# Patient Record
Sex: FEMALE | Race: BLACK OR AFRICAN AMERICAN | NOT HISPANIC OR LATINO | Employment: UNEMPLOYED | URBAN - METROPOLITAN AREA
[De-identification: names, ages, dates, MRNs, and addresses within clinical notes are randomized per-mention and may not be internally consistent; named-entity substitution may affect disease eponyms.]

---

## 2024-01-07 ENCOUNTER — TRANSFERRED RECORDS (OUTPATIENT)
Dept: HEALTH INFORMATION MANAGEMENT | Facility: CLINIC | Age: 44
End: 2024-01-07

## 2024-07-10 ENCOUNTER — TRANSFERRED RECORDS (OUTPATIENT)
Dept: HEALTH INFORMATION MANAGEMENT | Facility: CLINIC | Age: 44
End: 2024-07-10

## 2024-07-10 ENCOUNTER — HOSPITAL ENCOUNTER (OUTPATIENT)
Facility: CLINIC | Age: 44
End: 2024-07-10
Admitting: OBSTETRICS & GYNECOLOGY
Payer: MEDICAID

## 2024-07-10 ENCOUNTER — APPOINTMENT (OUTPATIENT)
Dept: ULTRASOUND IMAGING | Facility: CLINIC | Age: 44
End: 2024-07-10
Attending: STUDENT IN AN ORGANIZED HEALTH CARE EDUCATION/TRAINING PROGRAM
Payer: MEDICAID

## 2024-07-10 ENCOUNTER — HOSPITAL ENCOUNTER (OUTPATIENT)
Facility: CLINIC | Age: 44
Discharge: HOME | End: 2024-07-10
Attending: STUDENT IN AN ORGANIZED HEALTH CARE EDUCATION/TRAINING PROGRAM | Admitting: OBSTETRICS & GYNECOLOGY
Payer: MEDICAID

## 2024-07-10 VITALS
WEIGHT: 213.85 LBS | RESPIRATION RATE: 16 BRPM | HEART RATE: 102 BPM | OXYGEN SATURATION: 98 % | TEMPERATURE: 98.2 F | BODY MASS INDEX: 39.35 KG/M2 | HEIGHT: 62 IN | DIASTOLIC BLOOD PRESSURE: 74 MMHG | SYSTOLIC BLOOD PRESSURE: 132 MMHG

## 2024-07-10 PROBLEM — R10.13 EPIGASTRIC ABDOMINAL PAIN: Status: ACTIVE | Noted: 2024-07-10

## 2024-07-10 PROBLEM — R42 DIZZINESS: Status: ACTIVE | Noted: 2024-07-10

## 2024-07-10 PROBLEM — R10.9 FLANK PAIN: Status: ACTIVE | Noted: 2024-07-10

## 2024-07-10 LAB
ABO/RH(D): NORMAL
ALBUMIN MFR UR ELPH: 80.1 MG/DL
ALBUMIN SERPL BCG-MCNC: 3.5 G/DL (ref 3.5–5.2)
ALBUMIN UR-MCNC: 20 MG/DL
ALP SERPL-CCNC: 104 U/L (ref 40–150)
ALT SERPL W P-5'-P-CCNC: 74 U/L (ref 0–50)
ANION GAP SERPL CALCULATED.3IONS-SCNC: 10 MMOL/L (ref 7–15)
ANTIBODY SCREEN: NEGATIVE
APPEARANCE UR: CLEAR
AST SERPL W P-5'-P-CCNC: 65 U/L (ref 0–45)
BACTERIA #/AREA URNS HPF: ABNORMAL /HPF
BASOPHILS # BLD AUTO: 0 10E3/UL (ref 0–0.2)
BASOPHILS NFR BLD AUTO: 0 %
BILIRUB SERPL-MCNC: 2.2 MG/DL
BILIRUB UR QL STRIP: NEGATIVE
BUN SERPL-MCNC: 4.8 MG/DL (ref 6–20)
CALCIUM SERPL-MCNC: 8.8 MG/DL (ref 8.6–10)
CHLORIDE SERPL-SCNC: 106 MMOL/L (ref 98–107)
COLOR UR AUTO: ABNORMAL
CREAT SERPL-MCNC: 0.56 MG/DL (ref 0.51–0.95)
CREAT UR-MCNC: 344 MG/DL
DEPRECATED HCO3 PLAS-SCNC: 23 MMOL/L (ref 22–29)
EGFRCR SERPLBLD CKD-EPI 2021: >90 ML/MIN/1.73M2
EOSINOPHIL # BLD AUTO: 0.1 10E3/UL (ref 0–0.7)
EOSINOPHIL NFR BLD AUTO: 1 %
ERYTHROCYTE [DISTWIDTH] IN BLOOD BY AUTOMATED COUNT: 16.1 % (ref 10–15)
GLUCOSE SERPL-MCNC: 89 MG/DL (ref 70–99)
GLUCOSE UR STRIP-MCNC: NEGATIVE MG/DL
HBV SURFACE AB SERPL IA-ACNC: <3.5 M[IU]/ML
HBV SURFACE AB SERPL IA-ACNC: NONREACTIVE M[IU]/ML
HBV SURFACE AG SERPL QL IA: NONREACTIVE
HCT VFR BLD AUTO: 30.5 % (ref 35–47)
HCV AB SERPL QL IA: NONREACTIVE
HGB BLD-MCNC: 9.7 G/DL (ref 11.7–15.7)
HGB UR QL STRIP: NEGATIVE
HIV 1+2 AB+HIV1 P24 AG SERPL QL IA: NONREACTIVE
IMM GRANULOCYTES # BLD: 0.2 10E3/UL
IMM GRANULOCYTES NFR BLD: 2 %
KETONES UR STRIP-MCNC: 60 MG/DL
LEUKOCYTE ESTERASE UR QL STRIP: NEGATIVE
LYMPHOCYTES # BLD AUTO: 2 10E3/UL (ref 0.8–5.3)
LYMPHOCYTES NFR BLD AUTO: 22 %
MCH RBC QN AUTO: 29 PG (ref 26.5–33)
MCHC RBC AUTO-ENTMCNC: 31.8 G/DL (ref 31.5–36.5)
MCV RBC AUTO: 91 FL (ref 78–100)
MONOCYTES # BLD AUTO: 0.7 10E3/UL (ref 0–1.3)
MONOCYTES NFR BLD AUTO: 8 %
MUCOUS THREADS #/AREA URNS LPF: PRESENT /LPF
NEUTROPHILS # BLD AUTO: 5.9 10E3/UL (ref 1.6–8.3)
NEUTROPHILS NFR BLD AUTO: 66 %
NITRATE UR QL: NEGATIVE
NRBC # BLD AUTO: 0 10E3/UL
NRBC BLD AUTO-RTO: 0 /100
PH UR STRIP: 6 [PH] (ref 5–7)
PLATELET # BLD AUTO: 256 10E3/UL (ref 150–450)
POTASSIUM SERPL-SCNC: 3.7 MMOL/L (ref 3.4–5.3)
PROT SERPL-MCNC: 6 G/DL (ref 6.4–8.3)
PROT/CREAT 24H UR: 0.23 MG/MG CR (ref 0–0.2)
RBC # BLD AUTO: 3.35 10E6/UL (ref 3.8–5.2)
RBC URINE: 0 /HPF
SODIUM SERPL-SCNC: 139 MMOL/L (ref 135–145)
SP GR UR STRIP: 1.02 (ref 1–1.03)
SPECIMEN EXPIRATION DATE: NORMAL
SQUAMOUS EPITHELIAL: 1 /HPF
UROBILINOGEN UR STRIP-MCNC: 3 MG/DL
WBC # BLD AUTO: 8.9 10E3/UL (ref 4–11)
WBC URINE: 3 /HPF

## 2024-07-10 PROCEDURE — 87389 HIV-1 AG W/HIV-1&-2 AB AG IA: CPT | Performed by: STUDENT IN AN ORGANIZED HEALTH CARE EDUCATION/TRAINING PROGRAM

## 2024-07-10 PROCEDURE — 86900 BLOOD TYPING SEROLOGIC ABO: CPT | Performed by: STUDENT IN AN ORGANIZED HEALTH CARE EDUCATION/TRAINING PROGRAM

## 2024-07-10 PROCEDURE — 86706 HEP B SURFACE ANTIBODY: CPT | Performed by: STUDENT IN AN ORGANIZED HEALTH CARE EDUCATION/TRAINING PROGRAM

## 2024-07-10 PROCEDURE — 87340 HEPATITIS B SURFACE AG IA: CPT | Performed by: STUDENT IN AN ORGANIZED HEALTH CARE EDUCATION/TRAINING PROGRAM

## 2024-07-10 PROCEDURE — 86780 TREPONEMA PALLIDUM: CPT | Performed by: STUDENT IN AN ORGANIZED HEALTH CARE EDUCATION/TRAINING PROGRAM

## 2024-07-10 PROCEDURE — 86787 VARICELLA-ZOSTER ANTIBODY: CPT | Performed by: STUDENT IN AN ORGANIZED HEALTH CARE EDUCATION/TRAINING PROGRAM

## 2024-07-10 PROCEDURE — 83655 ASSAY OF LEAD: CPT | Performed by: STUDENT IN AN ORGANIZED HEALTH CARE EDUCATION/TRAINING PROGRAM

## 2024-07-10 PROCEDURE — 84155 ASSAY OF PROTEIN SERUM: CPT | Performed by: STUDENT IN AN ORGANIZED HEALTH CARE EDUCATION/TRAINING PROGRAM

## 2024-07-10 PROCEDURE — 82040 ASSAY OF SERUM ALBUMIN: CPT | Performed by: STUDENT IN AN ORGANIZED HEALTH CARE EDUCATION/TRAINING PROGRAM

## 2024-07-10 PROCEDURE — 84156 ASSAY OF PROTEIN URINE: CPT | Performed by: STUDENT IN AN ORGANIZED HEALTH CARE EDUCATION/TRAINING PROGRAM

## 2024-07-10 PROCEDURE — 87086 URINE CULTURE/COLONY COUNT: CPT | Performed by: STUDENT IN AN ORGANIZED HEALTH CARE EDUCATION/TRAINING PROGRAM

## 2024-07-10 PROCEDURE — 76705 ECHO EXAM OF ABDOMEN: CPT

## 2024-07-10 PROCEDURE — 81003 URINALYSIS AUTO W/O SCOPE: CPT | Performed by: STUDENT IN AN ORGANIZED HEALTH CARE EDUCATION/TRAINING PROGRAM

## 2024-07-10 PROCEDURE — G0463 HOSPITAL OUTPT CLINIC VISIT: HCPCS

## 2024-07-10 PROCEDURE — 36415 COLL VENOUS BLD VENIPUNCTURE: CPT | Performed by: STUDENT IN AN ORGANIZED HEALTH CARE EDUCATION/TRAINING PROGRAM

## 2024-07-10 PROCEDURE — 250N000013 HC RX MED GY IP 250 OP 250 PS 637: Performed by: STUDENT IN AN ORGANIZED HEALTH CARE EDUCATION/TRAINING PROGRAM

## 2024-07-10 PROCEDURE — 85025 COMPLETE CBC W/AUTO DIFF WBC: CPT | Performed by: STUDENT IN AN ORGANIZED HEALTH CARE EDUCATION/TRAINING PROGRAM

## 2024-07-10 PROCEDURE — 86762 RUBELLA ANTIBODY: CPT | Performed by: STUDENT IN AN ORGANIZED HEALTH CARE EDUCATION/TRAINING PROGRAM

## 2024-07-10 PROCEDURE — 86803 HEPATITIS C AB TEST: CPT | Performed by: STUDENT IN AN ORGANIZED HEALTH CARE EDUCATION/TRAINING PROGRAM

## 2024-07-10 RX ORDER — PROCHLORPERAZINE MALEATE 10 MG
10 TABLET ORAL EVERY 6 HOURS PRN
Status: DISCONTINUED | OUTPATIENT
Start: 2024-07-10 | End: 2024-07-10 | Stop reason: HOSPADM

## 2024-07-10 RX ORDER — ONDANSETRON 4 MG/1
4 TABLET, ORALLY DISINTEGRATING ORAL EVERY 6 HOURS PRN
Status: DISCONTINUED | OUTPATIENT
Start: 2024-07-10 | End: 2024-07-10 | Stop reason: HOSPADM

## 2024-07-10 RX ORDER — LABETALOL HYDROCHLORIDE 5 MG/ML
20-80 INJECTION, SOLUTION INTRAVENOUS EVERY 10 MIN PRN
Status: DISCONTINUED | OUTPATIENT
Start: 2024-07-10 | End: 2024-07-10 | Stop reason: HOSPADM

## 2024-07-10 RX ORDER — ONDANSETRON 2 MG/ML
4 INJECTION INTRAMUSCULAR; INTRAVENOUS EVERY 6 HOURS PRN
Status: DISCONTINUED | OUTPATIENT
Start: 2024-07-10 | End: 2024-07-10 | Stop reason: HOSPADM

## 2024-07-10 RX ORDER — HYDRALAZINE HYDROCHLORIDE 20 MG/ML
10 INJECTION INTRAMUSCULAR; INTRAVENOUS
Status: DISCONTINUED | OUTPATIENT
Start: 2024-07-10 | End: 2024-07-10 | Stop reason: HOSPADM

## 2024-07-10 RX ORDER — SODIUM CHLORIDE, SODIUM LACTATE, POTASSIUM CHLORIDE, CALCIUM CHLORIDE 600; 310; 30; 20 MG/100ML; MG/100ML; MG/100ML; MG/100ML
10-125 INJECTION, SOLUTION INTRAVENOUS CONTINUOUS
Status: DISCONTINUED | OUTPATIENT
Start: 2024-07-10 | End: 2024-07-10 | Stop reason: HOSPADM

## 2024-07-10 RX ORDER — LIDOCAINE 40 MG/G
CREAM TOPICAL
Status: DISCONTINUED | OUTPATIENT
Start: 2024-07-10 | End: 2024-07-10 | Stop reason: HOSPADM

## 2024-07-10 RX ORDER — MAGNESIUM HYDROXIDE/ALUMINUM HYDROXICE/SIMETHICONE 120; 1200; 1200 MG/30ML; MG/30ML; MG/30ML
15 SUSPENSION ORAL ONCE
Status: COMPLETED | OUTPATIENT
Start: 2024-07-10 | End: 2024-07-10

## 2024-07-10 RX ORDER — LABETALOL 100 MG/1
100 TABLET, FILM COATED ORAL 3 TIMES DAILY
Status: ON HOLD | COMMUNITY
End: 2024-09-13

## 2024-07-10 RX ORDER — METOCLOPRAMIDE 10 MG/1
10 TABLET ORAL EVERY 6 HOURS PRN
Status: DISCONTINUED | OUTPATIENT
Start: 2024-07-10 | End: 2024-07-10 | Stop reason: HOSPADM

## 2024-07-10 RX ORDER — FERROUS SULFATE 325(65) MG
325 TABLET ORAL
Status: ON HOLD | COMMUNITY
End: 2024-09-13

## 2024-07-10 RX ORDER — METOCLOPRAMIDE HYDROCHLORIDE 5 MG/ML
10 INJECTION INTRAMUSCULAR; INTRAVENOUS EVERY 6 HOURS PRN
Status: DISCONTINUED | OUTPATIENT
Start: 2024-07-10 | End: 2024-07-10 | Stop reason: HOSPADM

## 2024-07-10 RX ORDER — CALCIUM CARBONATE 500 MG/1
500 TABLET, CHEWABLE ORAL DAILY PRN
Status: DISCONTINUED | OUTPATIENT
Start: 2024-07-10 | End: 2024-07-10 | Stop reason: HOSPADM

## 2024-07-10 RX ORDER — PROCHLORPERAZINE 25 MG
25 SUPPOSITORY, RECTAL RECTAL EVERY 12 HOURS PRN
Status: DISCONTINUED | OUTPATIENT
Start: 2024-07-10 | End: 2024-07-10 | Stop reason: HOSPADM

## 2024-07-10 RX ADMIN — ALUMINUM HYDROXIDE, MAGNESIUM HYDROXIDE, AND DIMETHICONE 15 ML: 200; 20; 200 SUSPENSION ORAL at 14:46

## 2024-07-10 ASSESSMENT — ACTIVITIES OF DAILY LIVING (ADL)
ADLS_ACUITY_SCORE: 18

## 2024-07-10 NOTE — DISCHARGE INSTRUCTIONS
Data: Patient assessed in the [facility] for hypertension disorder of pregnancy. Cervical exam deferred. Membranes intact. Contractions are not present. See flowsheets for fetal assessment documentation.     Action: Presumed adequate fetal oxygenation documented. Discharge instructions reviewed. Patient instructed to report change in fetal movement, vaginal leaking of fluid or bleeding, abdominal pain, or any concerns related to the pregnancy to provider/clinic.      Response: Orders to discharge home. Patient verbalized understanding of education and agreement with plan. To follow up with maternal fetal medicine within a week.

## 2024-07-10 NOTE — PROGRESS NOTES
OB Triage Note        Assessment and Plan:     Vera Norwood is a 44 year old  at 26w1d with triplet pregnancy(Trichorionic-Triamniotic) with history of chronic hypertension, anemia, uterine fibroids who has interrupted prenatal care mostly done in the [place]. She presents to triage due to epigastric/flank pain and dizziness.   Patient Active Problem List   Diagnosis    Dizziness    Epigastric abdominal pain    Flank pain     #Chronic hypertension   Takes labetolol 100mg TID. She denies severe range BP outpatient, although checks BP intermittently when symptomatic. Presenting to triage with dizziness, intermittent blurry vision. BP on presentation 143/89. [place]reeclampsia labs from [place] done 2024 were unremarkable   Preeclampsia labs: Normal PLT, and Upr/cr ratio, however elevated LFTs (see below )  BP has been within normal prior to discharge  She will continue PTA labetalol       #Epigastic pain  #Flank pain    Urinalysis was unremarkable. CMP was notable forTransaminitis on labs, 3 times from prior. Abdominal US was unremarkable. Patient reported history of fatty liver.   PRN tums     #Triplet Pregnancy  Has been getting her prenatal care from a provider in the [place]. Last had US done 24. [place] They want to establish care here. Fetal heart tones within normal   Provided number to schedule appt with [facility]   Will need to follow up with MFM        Patient discussed with attending physician, Dr. Aquino  , who agrees with the plan.      Italia Meyer MD PGY3 7/10/2024  [facility]       Subjective:     Vera Norwood is a  44 year old female at 26w1d with a current prenatal history significant for chronic hypertension, anemia, uterine fibroids, who presents to OB triage with 3 day history of non radiating epigastric pain, bilateral flank pain, nausea and  "dizziness. She denied fevers, chills, dysuria, abnormal vaginal discharge, vaginal bleeding. She checks her BP when she has symptoms. Per patient they are in the SBP low 100s. Most recent check today was 111/78.       Vera Norwood is a patient of doctor at [facility] at .     She denies contractions. She denies fluid leakage. She denies bleeding per vagina. Fetal movement is normal.  Estimated Date of Delivery: Oct 15, 2024 No LMP recorded. Patient is pregnant.     Her prenatal course has been complicated by chronic hypertension, anemia.     Prenatal labs:   No results found for: \"ABO\", \"RH\", \"AS\", \"HEPBANG\", \"CHPCRT\", \"GCPCRT\", \"TREPAB\", \"RUBELLAABIGG\", \"HGB\", \"HIV\", \"GBS\"       Review of Systems:   CONSTITUTIONAL: no fatigue, no unexpected change in weight  SKIN: no worrisome rashes or lesions  EYES: intermittent blurry vision   ENT: no ear problems, no mouth problems, no throat problems  RESP: no significant cough, no shortness of breath  CV: no chest pain, no palpitations, no new or worsening peripheral edema  GI:+ nausea, epigastric pain, bilateral flank pain, no constipation, no diarrhea  : no frequency, no dysuria, no hematuria  NEURO: +dizziness, no weakness, no headaches  ENDOCRINE: no temperature intolerance, no skin/hair changes  PSYCHIATRIC: NEGATIVE for changes in mood or trouble with sleep         Physical Exam:   Vitals:   BP (!) 164/74   Pulse 102   Temp 98.2  F (36.8  C) (Oral)   Resp 16   Ht 1.575 m (5' 2\")   Wt 97 kg (213 lb 13.5 oz)   SpO2 98%   BMI 39.11 kg/m    213 lbs 13.54 oz  Estimated body mass index is 39.11 kg/m  as calculated from the following:    Height as of this encounter: 1.575 m (5' 2\").    Weight as of this encounter: 97 kg (213 lb 13.5 oz).    GEN: Awake, alert in no apparent distress   HEENT: grossly normal  NECK: no lymphadenopathy or thryoidomegaly  RESPIRATORY: clear to auscultation bilaterally, no increased work of breathing  BACK:  " no costovertebral angle tenderness   CARDIOVASCULAR: RRR,  flow murmur   ABDOMEN: gravid.  Cervix: Deferred   EXT:  no edema or calf tenderness    Fetal heart tones  Twin A  Twin B   Twin C       Labs today:  Results for orders placed or performed during the hospital encounter of 07/10/24   CBC with Platelets & Differential     Status: None (In process)    Narrative    The following orders were created for panel order CBC with Platelets & Differential.  Procedure                               Abnormality         Status                     ---------                               -----------         ------                     CBC with platelets and d...[[id number]]                      In process                   Please view results for these tests on the individual orders.   ABO/Rh type and screen     Status: None (In process)    Narrative    The following orders were created for panel order ABO/Rh type and screen.  Procedure                               Abnormality         Status                     ---------                               -----------         ------                     Adult Type and Screen[[id number]]                            In process                   Please view results for these tests on the individual orders.

## 2024-07-10 NOTE — PROGRESS NOTES
Pt arrived [place] [facility] [place]/o mid, upper abdominal pain. , currently pregnant with triplets which have been confirmed via [place]  [place]. [place] from [place] 2 [place] ago and has not established care with a provider. AMA, hx of hypertension and uterine fibroids. Pt brought to room . Difficult to obtain consistent FHR tracings with external monitors, however baby A, B and C all have heart rates 145-155 bpm and all fetuses are active. Uterus soft, non tender. Pt denies feeling ctx's. BP's elevated, pt denies HA, blurred vision. Reflexes normal, no clonus. Provider aware of pt's arrival and assessments. Will continue to monitor.

## 2024-07-10 NOTE — H&P
[facility] LABOR & DELIVERY   METROPARTNERS HISTORY AND PHYSICAL    NAME:Vera Norwood  : 1980   MRN: [id number]   GESTATIONAL AGE: 26w1d    ADMISSION DATE: 7/10/2024     PCP:  No established care.    CHIEF COMPLAINT:  epigastric pain    HPI: I am here to evaluate Vera Norwood for epigastric discomfort. Patient is a 44 year old,  female @ 26w1d gestation by reported EDC. History obtained through the patient and chart review. Patient reports that she was to the [facility] 5 days ago.  Her significant other lives here permanently.  Patient is currently pregnant with spontaneous TRI TRI TRI triplets.  Patient presents to labor and delivery at [facility] with report of epigastric discomfort.  Pregnancy is otherwise complicated by: AMA, CHTN on medication, history of myomectomy x 2, elevated LFTs with a history of fatty liver disease, and scant prenatal care.    DIAGNOSIS COMPLICATING PREGNANCY  Spontaneous trichorionic triplets  AMA  Chronic hypertension on medication (labetalol 200 mg 3 times daily)  History of fatty liver disease  History of myomectomy x 2 -1 after her vaginal deliveries  Unsure dating - patient reports EDC of 10/15/2024 // only US documentation is US done 2024 with EDC of 10/19/2024  Anemia      OBSTETRICAL HISTORY:     -2004 male 9 pounds 6 ounces 42.5 weeks , [place]  - female 10 pounds 6 ounces 43.6 weeks , [place]    PAST MEDICAL HISTORY:  Past Medical History:   Diagnosis Date    Anemia     Hypertension         PAST SURGICAL HISTORY:  -Myomectomy, laparoscopically   -Myomectomy, open     SOCIAL HISTORY:   reports that she has never smoked. She has never used smokeless tobacco. She reports that she does not currently use alcohol. She reports that she does not use drugs.     MEDICATIONS:  Current Facility-Administered Medications   Medication Dose Route Frequency Provider Last Rate Last Admin    calcium carbonate (TUMS)  "chewable tablet 500 mg  500 mg Oral Daily PRN Italia Meyer MD        hydrALAZINE (APRESOLINE) injection 10 mg  10 mg Intravenous Q20 Min PRN Italia Meyer MD        labetalol (NORMODYNE/TRANDATE) injection 20-80 mg  20-80 mg Intravenous Q10 Min PRN Italia Meyer MD        lactated ringers infusion   mL/hr Intravenous Continuous Italia Meyer MD        lidocaine (LMX4) cream   Topical Q1H PRN Italia Meyer MD        lidocaine 1 % 0.1-1 mL  0.1-1 mL Other Q1H PRN Italia Meyer MD        metoclopramide (REGLAN) injection 10 mg  10 mg Intravenous Q6H PRN Italia Meyer MD        Or    metoclopramide (REGLAN) tablet 10 mg  10 mg Oral Q6H PRN Italia Meyer MD        ondansetron (ZOFRAN ODT) ODT tab 4 mg  4 mg Oral Q6H PRN Italia Meyer MD        Or    ondansetron (ZOFRAN) injection 4 mg  4 mg Intravenous Q6H PRN Italia Meyer MD        prochlorperazine (COMPAZINE) injection 10 mg  10 mg Intravenous Q6H PRN Italia Meyer MD        Or    prochlorperazine (COMPAZINE) tablet 10 mg  10 mg Oral Q6H PRN Italia Meyer MD        Or    prochlorperazine (COMPAZINE) suppository 25 mg  25 mg Rectal Q12H PRN Italia Meyer MD        sodium chloride (PF) 0.9% PF flush 3 mL  3 mL Intracatheter Q8H Italia Meyer MD        sodium chloride (PF) 0.9% PF flush 3 mL  3 mL Intracatheter q1 min prn Italia Meyer MD            ALLERGIES:  No Known Allergies     REVIEW OF SYSTEMS   Negative except what is stated in the HPI    PHYSICAL EXAM:  /74   Pulse 102   Temp 98.2  F (36.8  C) (Oral)   Resp 16   Ht 1.575 m (5' 2\")   Wt 97 kg (213 lb 13.5 oz)   SpO2 98%   BMI 39.11 kg/m    GEN: NAD; Alert and oriented, appropriate affect.  HEENT  negative  Heart       Lungs      Abdomen   Abdomen soft, non-tender. BS normal. No masses, organomegaly  Extremities  Normal, Warm, No cyanosis, no clubbing, No edema, and nontender  Vaginal exam: deferred  Membranes: intact    Fetal heart Rate Tracing: appropriate for " gestational age x3  Contractions: acontractile          Pertinent Studies:   All laboratory data reviewed  Serum Glucose range:   Recent Labs   Lab 07/10/24  1436   GLC 89       CBC:   Recent Labs   Lab 07/10/24  1436   WBC 8.9   HGB 9.7*   HCT 30.5*   MCV 91      NEUTROPHIL 66   LYMPH 22   MONOCYTE 8   EOSINOPHIL 1     Chemistry:   Recent Labs   Lab 07/10/24  1436      POTASSIUM 3.7   CHLORIDE 106   CO2 23   BUN 4.8*   CR 0.56   GFRESTIMATED >90   JOSELYN 8.8   PROTTOTAL 6.0*   ALBUMIN 3.5   AST 65*   ALT 74*   ALKPHOS 104   BILITOTAL 2.2*       IMAGING:  US Abdomen Limited  Narrative: EXAM: US ABDOMEN LIMITED  LOCATION: [facility]  DATE: 7/10/2024    INDICATION:  with triplet pregnancy with Epigastric pain with transaminitis  COMPARISON: None.  TECHNIQUE: Limited abdominal ultrasound.    FINDINGS:    GALLBLADDER: Normal. No gallstones, wall thickening, or pericholecystic fluid. Negative sonographic Lawrence's sign.    BILE DUCTS: No biliary dilatation. The common duct measures 4 mm.    LIVER: Normal parenchyma with smooth contour. No focal mass.    RIGHT KIDNEY: No hydronephrosis.    PANCREAS: The visualized portions are normal.    No ascites.  Impression: IMPRESSION:    Normal limited abdominal ultrasound.     I have reviewed the radiologists interpretation     IMPRESSION:  44 year old  @ 26w1d with epigastric pain  High risk complicated pregnancy: Trichorionic triplets, chronic hypertension, history of myomectomy x 2, scant prenatal care, elevated LFTs    Right upper quadrant ultrasound completed today and found to be normal  Mild improvement of symptoms    RECOMMENDATIONS:  Case discussed with Dr. Quinones ([facility]) at [facility]  Contact information shared.  Plan for follow-up in their clinic within the week.  Will place maternal-fetal medicine referral.  Dr. Quinones plans to discuss with her clinic and will contact patient this week    Total time: 30  minutes      Dagmar Aquino,  on 7/10/2024 at 5:35 PM

## 2024-07-11 LAB
RUBV IGG SERPL QL IA: 4.81 INDEX
RUBV IGG SERPL QL IA: POSITIVE
T PALLIDUM AB SER QL: NONREACTIVE
VZV IGG SER QL IA: 1684 INDEX
VZV IGG SER QL IA: POSITIVE

## 2024-07-12 LAB
BACTERIA UR CULT: NORMAL
LEAD BLDV-MCNC: <2 UG/DL

## 2024-07-22 ENCOUNTER — TRANSCRIBE ORDERS (OUTPATIENT)
Dept: MATERNAL FETAL MEDICINE | Facility: CLINIC | Age: 44
End: 2024-07-22
Payer: MEDICAID

## 2024-07-22 ENCOUNTER — MEDICAL CORRESPONDENCE (OUTPATIENT)
Dept: HEALTH INFORMATION MANAGEMENT | Facility: CLINIC | Age: 44
End: 2024-07-22
Payer: MEDICAID

## 2024-07-22 ENCOUNTER — TELEPHONE (OUTPATIENT)
Dept: MATERNAL FETAL MEDICINE | Facility: CLINIC | Age: 44
End: 2024-07-22
Payer: MEDICAID

## 2024-07-22 DIAGNOSIS — O30.109: Primary | ICD-10-CM

## 2024-07-22 DIAGNOSIS — O09.529 AMA (ADVANCED MATERNAL AGE) MULTIGRAVIDA 35+: ICD-10-CM

## 2024-07-22 DIAGNOSIS — O10.919 CHRONIC HYPERTENSION AFFECTING PREGNANCY: ICD-10-CM

## 2024-07-22 DIAGNOSIS — O26.90 PREGNANCY RELATED CONDITION, ANTEPARTUM: Primary | ICD-10-CM

## 2024-07-22 NOTE — TELEPHONE ENCOUNTER
Patient contacted the clinic looking to schedule an appointment. Chart review showed patient was evaluated on 7/10 at [facility] L&D by Dr. Aquino. Was recommended to be seen here at [facility]. Unable to locate an order. Writer contacted Dr. Aquino's office and spoke with DANIEL Loyd to have a referral sent ASA. Once received will contact patient to be scheduled in clinic.

## 2024-07-30 ENCOUNTER — PRE VISIT (OUTPATIENT)
Dept: MATERNAL FETAL MEDICINE | Facility: CLINIC | Age: 44
End: 2024-07-30
Payer: MEDICAID

## 2024-07-30 DIAGNOSIS — O30.103: Primary | ICD-10-CM

## 2024-07-31 NOTE — PROGRESS NOTES
[facility] Maternal Fetal Medicine Center  Genetic Counseling Consult    Patient:  Murali Norwood  Preferred Name: Murali YOB: 1980   Date of Service:  24   MRN: [id number]    Murali was seen at the [facility] Maternal [facility]etal [facility] Center for genetic consultation. The indication for genetic counseling is advanced maternal age in triplet pregnancy. The patient was accompanied to this visit by their partner, Anthony.    This session was observed by pediatric resident, Deborah Novoa MD with patient permission.    The session was conducted in English.      IMPRESSION/ PLAN   1. Murali has not had genetic screening in this pregnancy and declines options discussed today.    2. During today's [facility] visit, Murali had a genetic counseling session only. Screening and diagnostic testing was discussed and declined.     3. The patient has declined genetic aneuploidy screening. The maternal quad screen is NOT recommended.    4. Murali had a level II comprehensive anatomy ultrasound today. Please see the ultrasound report for further details.    5. Further recommendation include a follow-up ultrasound with [facility]. The upcoming ultrasound has been scheduled for 2024.    6. The patient reports anxiety about triplet newborns. [facility] referral offered and accepted. Placed today.    PREGNANCY HISTORY   /Parity:       Murali's pregnancy history is significant for:   A son (20y) born vaginally in  with a prior partner  A daughter (17y) born vaginally in  with a prior partner    CURRENT PREGNANCY   Current Age: 44 year old   Age at Delivery: 44 year old  YVONNE: 10/15/2024, by Patient Reported                                   Gestational Age: 29w1d  This pregnancy is a trichorionic triamniotic triplet gestation.   Multiples pregnancies are described by the number of placentas (-chorionic) and amniotic sacs (-amniotic). In addition, multiples  "pregnancies are also characterized by the number (mono- or di- or tri-) of zygotes (fertilized egg) the pregnancy developed from.  This pregnancy is a trichorionic triamniotic triplet pregnancy which means the babies have separate placentas and separate amniotic sacs. Due to being trichorionic, there is a chance same sex fetuses are monozygotic and a chance they are not.  This pregnancy was conceived spontaneously.  Murali denies bleeding, complications, illnesses, fevers, and exposure concerns. She reports she is taking Labetalol and prenatal vitamins.  MEDICAL HISTORY   Vera s reported medical history is not expected to impact pregnancy management or risks to fetal development. She has an OB visit with [facility] scheduled for 08/06/24.       FAMILY HISTORY   A three-generation pedigree was obtained today and is scanned under the \"Media\" tab in Epic. The family history was reported by Vera and their partner.    The following significant findings were reported today:   Murali reports that her father (71y) was diagnosed with bowel cancer at 69-years-old. We discussed the family history of cancer briefly. Cancer most often occurs by chance; however, some families seem to develop cancer more frequently than expected. Everyone has a risk to develop cancer, but individuals may be at an increased risk to develop cancer based on their family history. Cancer family history, even without genetic testing, can change cancer screening recommendations for family members and aid in insurance coverage for access to them as well.  If the family wants more information they can contact the [facility] Cancer Risk Management Program ([phone]).     Otherwise, the reported family history is unremarkable for multiple miscarriages, stillbirths, birth defects, intellectual disabilities, known genetic conditions, cancer <50y, and consanguinity.       RISK ASSESSMENT FOR INHERITED CONDITIONS AND CARRIER SCREENING " OPTIONS   Expanded carrier screening is available to screen for autosomal recessive conditions and X-linked conditions in a large list of genes. Carrier screening does not test the pregnancy but gives a risk assessment for the pregnancy and future pregnancies to have the condition. Expanded carrier screening is designed to identify carrier status for conditions that are primarily childhood or adolescent onset. Expanded carrier screening does not evaluate for adult-onset conditions such as hereditary cancer syndromes, dementia/ Alzheimer's disease, or cardiovascular disease risk factors. Additionally, expanded carrier screening is not comprehensive for all known genetic diseases or inherited conditions. Carrier screening does not test for all genetic and health conditions or risk factors.     Autosomal recessive conditions happen when a mutation has been inherited from the egg and sperm and include conditions like cystic fibrosis, thalassemia, hearing loss, spinal muscular atrophy, and more. We reviewed that when both biological parents carry a harmful genetic change in a gene associated with autosomal recessive inheritance, each of their pregnancies has a 1 in 4 (25%) chance to be affected by that condition. X-linked conditions happen when a mutation has been inherited from the egg and include conditions like fragile X syndrome.With x-linked conditions, the specific risk generally depends on the chromosomal sex of the fetus, with XY individuals (generally male) being most severely affected.     [place] screening was reviewed. About [place] Screening    The patient does NOT have a family history of known inherited conditions. This does NOT mean the patient and/or their partner is not a carrier of a condition. Approximately 90% of couples at an increased reproductive risk for an inherited condition have no family history of that condition.     The patient nor their partner have had carrier screening previously.      The patient declined the carrier screening options. They are aware the option will remain, and they can contact us if they would like to pursue screening.    RISK ASSESSMENT FOR CHROMOSOME CONDITIONS   We explained that the risk for fetal chromosome abnormalities increases with maternal age. We discussed specific features of common chromosome abnormalities, including trisomy 21 (Down syndrome), trisomy 13, trisomy 18, and sex chromosome trisomies. Sex chromosome aneuploidies are not validated for NIPT in triplet pregnancies. The patient declined amniocentesis. Therefore, sex chromosome aneuploidies not discussed.    At age 44 at midtrimester, the risk for any of the babies to have Down syndrome in a triplet pregnancy is 1 in 8.   At age 44 at midtrimester, the risk for any of the babies to have any chromosome abnormality in a triplet pregnancy is 1 in 5.     Murali has not had genetic screening in this pregnancy and declines options discussed today.     GENETIC TESTING OPTIONS   Genetic testing during a pregnancy includes screening and diagnostic procedures.      Screening tests are non-invasive which means no risk to the pregnancy and includes ultrasounds and blood work. The benefits and limitations of screening were reviewed. Screening tests provide a risk assessment (chance) specific to the pregnancy for certain fetal chromosome abnormalities but cannot definitively diagnose or exclude a fetal chromosome abnormality. Follow-up genetic counseling and consideration of diagnostic testing is recommended with any abnormal screening result. Diagnostic testing during a pregnancy is more certain and can test for more conditions. However, the tests do have a risk of miscarriage that requires careful consideration. These tests can detect fetal chromosome abnormalities with greater than 99% certainty. Results can be compromised by maternal cell contamination or mosaicism and are limited by the resolution of current  genetic testing technology.     There is no screening or diagnostic test that detects all forms of birth defects or intellectual disability.     We discussed the following screening options:     Non-invasive prenatal testing (NIPT)  Also called cell-free DNA screening because it detects chromosomes from the placenta in the pregnant person's blood  Can be done any time after 10 weeks gestation  Standard recommendation for NIPT screens for trisomy 21, trisomy 18, trisomy 13, with the option of adding sex chromosome aneuploidies, without or without predicted sex  Sex chromosome aneuploidies not validated in triplet pregnancies  Cannot screen for open neural tube defects, maternal serum AFP after 15 weeks is recommended  New NIPT options include screening for other trisomies, microdeletion syndromes, and in some cases fetal blood antigens. Guidelines do not recommend these conditions are included in standard screening. These options have limitations and should be discussed with a genetic counselor.   However, current (2023) ACMG guidelines do recommend that screening for one microdeletion syndrome, called 22q11.2 deletion syndrome be offered to all pregnant patients. 22q11.2 deletion syndrome has an estimated prevalence of 1 in 990 to 1 in 2148 (0.05-0.1%). Risk is not thought to increase with maternal age. Clinical features are variable but include congenital heart defects, cleft palate, developmental delays, immune system deficiencies, and hearing loss. Approximately 90% of cases are de gary (a sporadic new change in a pregnancy). Cell-free DNA screening for 22q11.2 deletion syndrome is available with the inclusion of other microdeletion syndromes. There is less data about the performance of cell-free DNA screening for more rare microdeletions and the chance for false positives or negative may be increased.  There are limitations of cell-free DNA screening in detecting microdeletions and the possiblity of false  positives and false negatives. Microdeletions were not discussed due to NIPT declined.    We discussed the following ultrasound options:    Comprehensive level II ultrasound (Fetal Anatomy Ultrasound)  Ultrasound done between 18-20 weeks gestation  Screens for major birth defects and markers for aneuploidy (like trisomy 21 and trisomy 18)  Includes looking at the fetus/baby's growth, heart, organs (stomach, kidneys), placenta, and amniotic fluid    We discussed the following diagnostic options:     Amniocentesis  Invasive diagnostic procedure done after 15 weeks gestation  The procedure collects a small sample of amniotic fluid for the purpose of chromosomal testing and/or other genetic testing  Diagnostic result; more than 99% sensitivity for fetal chromosome abnormalities  Testing for AFP in the amniotic fluid can test for open neural tube defects    The patient declined amniocentesis today.    It was a pleasure to be involved with Om[facility]ane s care. Face-to-face time of the meeting was 45 minutes.    Neelam Harmon GC, MS, C  Board Certified and [place] Licensed Genetic Counselor  [facility]  Maternal Fetal Medicine  Office: [phone]  [facility]: [phone]   Fax: [phone]  [facility]

## 2024-08-01 ENCOUNTER — OFFICE VISIT (OUTPATIENT)
Dept: MATERNAL FETAL MEDICINE | Facility: CLINIC | Age: 44
End: 2024-08-01
Attending: OBSTETRICS & GYNECOLOGY
Payer: MEDICAID

## 2024-08-01 ENCOUNTER — TRANSCRIBE ORDERS (OUTPATIENT)
Dept: MATERNAL FETAL MEDICINE | Facility: CLINIC | Age: 44
End: 2024-08-01
Payer: MEDICAID

## 2024-08-01 ENCOUNTER — HOSPITAL ENCOUNTER (OUTPATIENT)
Dept: ULTRASOUND IMAGING | Facility: CLINIC | Age: 44
Discharge: HOME | End: 2024-08-01
Attending: OBSTETRICS & GYNECOLOGY
Payer: MEDICAID

## 2024-08-01 DIAGNOSIS — O26.90 PREGNANCY RELATED CONDITION, ANTEPARTUM: Primary | ICD-10-CM

## 2024-08-01 DIAGNOSIS — O10.919 CHRONIC HYPERTENSION AFFECTING PREGNANCY: ICD-10-CM

## 2024-08-01 DIAGNOSIS — O30.109: ICD-10-CM

## 2024-08-01 DIAGNOSIS — Z31.5 ENCOUNTER FOR PROCREATIVE GENETIC COUNSELING: ICD-10-CM

## 2024-08-01 DIAGNOSIS — O09.529 AMA (ADVANCED MATERNAL AGE) MULTIGRAVIDA 35+: ICD-10-CM

## 2024-08-01 DIAGNOSIS — O09.523 MULTIGRAVIDA OF ADVANCED MATERNAL AGE IN THIRD TRIMESTER: ICD-10-CM

## 2024-08-01 DIAGNOSIS — O09.523 MULTIGRAVIDA OF ADVANCED MATERNAL AGE IN THIRD TRIMESTER: Primary | ICD-10-CM

## 2024-08-01 DIAGNOSIS — O30.133 TRICHORIONIC TRIAMNIOTIC TRIPLET PREGNANCY IN THIRD TRIMESTER: Primary | ICD-10-CM

## 2024-08-01 PROCEDURE — 59025 FETAL NON-STRESS TEST: CPT | Mod: 59

## 2024-08-01 PROCEDURE — 96040 HC GENETIC COUNSELING, EACH 30 MINUTES: CPT

## 2024-08-01 PROCEDURE — 59025 FETAL NON-STRESS TEST: CPT

## 2024-08-01 PROCEDURE — 76811 OB US DETAILED SNGL FETUS: CPT | Mod: 26 | Performed by: OBSTETRICS & GYNECOLOGY

## 2024-08-01 PROCEDURE — 76812 OB US DETAILED ADDL FETUS: CPT | Mod: 26 | Performed by: OBSTETRICS & GYNECOLOGY

## 2024-08-01 PROCEDURE — 76820 UMBILICAL ARTERY ECHO: CPT | Mod: 26 | Performed by: OBSTETRICS & GYNECOLOGY

## 2024-08-01 PROCEDURE — 76820 UMBILICAL ARTERY ECHO: CPT

## 2024-08-01 PROCEDURE — 59025 FETAL NON-STRESS TEST: CPT | Mod: 26 | Performed by: OBSTETRICS & GYNECOLOGY

## 2024-08-01 NOTE — PROGRESS NOTES
NST Performed due to new dx FGR in fetus C.   reviewed efm tracing. See NST/BPP Doc Flowsheet tab.

## 2024-08-01 NOTE — PROGRESS NOTES
"Please see \"Imaging\" tab under \"Chart Review\" for details of today's US at the [facility].    Apolinar Coon MD  Maternal-Fetal Medicine    "

## 2024-08-01 NOTE — NURSING NOTE
Patient reports good fetal movement, no pain, denies contractions, leaking of fluid, or bleeding.  Education provided to patient on return appointments for first OB and next appointment with limited US. Dating changed to reflect US completed in the [place].  SBAR given to KATLIN PHELPS, see their note in Epic.    Emmy Contreras RN on 8/1/2024 at 11:01 AM

## 2024-08-02 PROBLEM — I10 CHRONIC HYPERTENSION: Status: ACTIVE | Noted: 2024-08-02

## 2024-08-02 PROBLEM — Z98.890 H/O: MYOMECTOMY: Status: ACTIVE | Noted: 2024-08-02

## 2024-08-02 PROBLEM — O30.133: Status: ACTIVE | Noted: 2024-08-02

## 2024-08-02 PROBLEM — O09.30 LATE PRENATAL CARE: Status: ACTIVE | Noted: 2024-08-02

## 2024-08-02 PROBLEM — O09.529 AMA (ADVANCED MATERNAL AGE) MULTIGRAVIDA 35+: Status: ACTIVE | Noted: 2024-08-02

## 2024-08-04 NOTE — PROGRESS NOTES
[facility]  [address] [place] [address] S [address], [place] [address]  Main: [phone], Fax: [phone]       Referring Provider:  Sameer ([facility])    ERIC     Mooseavinashisela Norwood is a 44 year old  at 29w3d by self stated YVONNE consistent with 28w5d US here () for MFM consultation regarding spontaneous tri-tri triplet pregnancy, with selective FGR of twin C with normal UA DFS. Fetus C had an AC at the 2%tile and EFW at the 5%tile. The discordance (between the largest, triplet A, and smallest, triplet C) is 12%.     She has an obstetrical history of 2x postterm SVDs of infants weighing 9 and 10lbs.     Her pregnancy is otherwise complicated by:  - CHTN and is taking labetalol 200mg TID, She is occasionally taking her BP at home  - AMA. She has met with genetic counseling and declines genetic screening or other diagnostic testing  - h//o of fatty liver - slightly elevated LFTs (60-70s) with normal RUQ US on 7/10/24  - h/o 2 prior myomectomies- one open via pfannenstiel incision, one laparoscopically. Patient describes these as being outside of the uterus and denies that the uterine cavity was entered.     Prenatal Care:  Primary OB care this pregnancy has been with Dr. Estrella [facility]     OB History    Para Term  AB Living   3 2 2 0 0 2   SAB IAB Ectopic Multiple Live Births   0 0 0 0 2      # Outcome Date GA Lbr Lauro/2nd Weight Sex Type Anes PTL Lv   3 Current            2 Term            1 Term                Gynecologic History   - Denies any history of abnormal pap smears  - Denies prior cervical surgery or procedures  - Denies any history of frequent UTIs, vaginal infections, or STIs    Past Medical History     Past Medical History:   Diagnosis Date    Anemia     Hypertension        Past Surgical History     Past Surgical History:   Procedure Laterality Date    INTERVENTIONAL RADIOLOGY ADULT/PEDS IP CONSULT         Medication List  "    Prior to Admission medications    Medication Sig Last Dose Taking? Auth Provider Long Term End Date   ferrous sulfate (FEROSUL) 325 (65 Fe) MG tablet Take 325 mg by mouth daily (with breakfast)   Reported, Patient     labetalol (NORMODYNE) 100 MG tablet Take 100 mg by mouth 3 times daily   Reported, Patient     Prenatal Vit-Fe Fumarate-FA (PRENATAL MULTIVITAMIN  PLUS IRON) 27-1 MG TABS Take by mouth daily   Reported, Patient         Allergies   Patient has no known allergies.    Social History   Denies use of alcohol, drugs or smoking.    Family History   Please see genetic counseling note for detailed 3-generation family history.   Family history unsignificant.    Specifically, denies family history of motor/intellectual impairment, genetic or chromosome abnormalities or congenital anomalies.      Review of Systems   10-point ROS negative except as in HPI.    Physical Exam   /84 (BP Location: Left arm, Patient Position: Supine, Cuff Size: Adult Large)   Pulse 86   Resp 20   Wt 95.7 kg (210 lb 14.4 oz)   SpO2 99%   BMI 38.57 kg/m      Gen: NAD  CV: RRR  Resp: CTAB  Abd: Gravid, non-tender  Ext: No edema  Cervical exam: 0/0/-5. Posterior     Labs        Lab Results   Component Value Date    HGB 9.7 (L) 07/10/2024     Lab Results   Component Value Date    AST 65 (H) 07/10/2024    ALT 74 (H) 07/10/2024    ALKPHOS 104 07/10/2024    BILITOTAL 2.2 (H) 07/10/2024          Ultrasound   See today's ultrasound report under the \"imaging\" tab.    Other Pertinent Evaluation     N/A      Assessment/Counseling     Murali Norwood is a 44 year old  at 29w3d by stated YVONNE consistent with 28w5d US here for MFM consultation regarding tri-tri triplet gestation.    Recommendations     Genetic screening  - Met with genetic counseling (see separate note for full details)  - Declined genetic screening    Medications  - Labetalol 200mg TID for CHTN, continue     Laboratory evaluation  - GCT and third trimester " labs collected today. She has already received her Tdap vaccine while in the [place].     Maternal antepartum management  - Trichorionic/Triamniotic Triplet Gestation:    The incidence of triplet gestation has increased by several hundred percent since the 1980s after the development of artificial reproductive technologies.  The incidence of spontaneous triplet gestation is around 1/7,000 births.  In  the approximate incidence was 153.9/100,000 births.  A move toward single embryo transfer has decreased this rate somewhat to 119.5/100,000 births in 2013.    Triplet gestation is associated with higher rates of almost every potential complication of pregnancy, with the exceptions of post-term pregnancy and macrosomia.  The rates of both maternal and fetal/ risks are increased.    With regards to fetal/ outcome, the most serious risk is spontaneous  delivery due to  labor and PPROM, which plays a major role in the increased  mortality and morbidity in these infants.   The average triplet gestation delivers at 32 weeks (versus 35 for twins and 39 for singletons).  A significant proportion (37-41 percent) deliver at less than 32 weeks and (97 percent) before 37 weeks.  Most triplets (95 percent) are low-birth weight (<2500 grams) and a significant proportion (37 percent) are very low birthweight (<1500 grams).      Amongst triplets there are increased rates of cerebral palsy (28 per 1000 live births) and infant mortality (52.5 per 1000 live births).  Higher rates of placental abruption, fetal growth restriction (FGR) and congenital anomalies also contribute to adverse outcome in multiple gestations.  There are also higher rates of first trimester loss; the rate of  vanishing  twin was over 50% in some studies.  The rate of fetal demise is increased.     The risk of aneuploidy is higher in triplet gestations.  A 28 year-old with a trichorionic-triamniotic triplet gestation has  approximately the same Down syndrome risk of a 35 year-old carrying a camejo.  Genetic counseling is recommended to discuss accepted screening methods (nuchal translucency) and testing (CVS and amniocentesis).   Certain screening tests, such as serum screening, cell free DNA, and MSAFP, are not validated in triplet pregnancies and should not be offered.  - The patient has declined genetic screening/testing.     Triplet gestation also increases the maternal risks of pregnancy including increased rates of hyperemesis, gestational diabetes (22 percent), anemia, uterine atony, hemorrhage, need for transfusion, possibly hysterectomy, cholestasis, postpartum depression, thrombocytopenia and hypertensive disorders including preeclampsia (20-40 percent), HELLP syndrome and acute fatty liver. Women carrying triplet gestations also have more frequent ultrasounds, office visits, and a higher likelihood of prolonged hospitalization.      Successful multifetal reduction reduces risk of spontaneous  delivery and certain maternal complications, such as preeclampsia.  - The patient is past the legal [place] of termination in the state [facility]. And this was not addressed.    Although women with triplet gestations are at increased risk for  labor, many interventions effective in singletons have not been shown to be effective in triplets.  Bed rest, cerclage, pessary, prophylactic tocolysis and progesterone have not been shown to decrease the risk of  birth.     Recommendations:  Genetic counseling- performed and patient declines   Comprehensive anatomic survey at 18 weeks - completed   Ultrasound assessment of fetal growth every three weeks   assessment of fetal well-being with weekly modified BPPs due to FGR issue   Weight gain is dependent on maternal BMI: around 54 pounds for women with normal BMI, 50 pounds for overweight women and 42 pounds for obese women.    4903-1086 mg calcium daily  1000 IU vitamin  D daily  At least 1 mg folic acid daily  30 mg of iron as long as there is no anemia    Weekly office visits after 24 weeks to screen for preeclampsia and  labor signs and symptoms  Women who are deemed at high risk for  delivery should be given  corticosteroids (<34 weeks) as well as magnesium sulfate for fetal neuroprotection (<32 weeks) as appropriate  GCT at 26-28 weeks       -Advanced Maternal Age  The patient has already seen genetic counseling. She declines genetic screening or testing.    Patients of advanced age are also at increased risks of pregnancy complications, including miscarriage, preeclampsia, abnormalities with placentation, stillbirth, and  delivery.  These risks increase with increasing maternal age and comorbidities.     - Ultrasound and delivery timing per triplet and FGR issues       - Chronic Hypertension    The concern with chronic hypertension is that such patients are more likely to develop preeclampsia during the pregnancy, with a risk of 20-50%.  Women with chronic hypertension are at increased risk for early-onset preeclampsia.  Low dose aspirin has been used to lower this risk.  Chronic hypertension also increases the risk of maternal stroke, pulmonary edema, renal failure, gestational diabetes, iatrogenic  birth, fetal growth restriction, placental abruption and  mortality rate including stillbirth    Without baseline laboratory assessment, it may be difficult to distinguish an exacerbation of hypertension from preeclampsia, especially in the third trimester. We reviewed that it is   generally anticipated that blood pressure will gradually decrease during early pregnancy, reaching at pete at 28-32 weeks, and then slowly rise to pre-pregnancy levels.     We reviewed the goals of antihypertensive therapy in pregnancy, specifically there is new data to suggest that improved blood pressure control during pregnancy reduces the risk of  developing superimposed preeclampsia with severe features, abruption,  delivery < 35 weeks, and fetal/ death without an increased risk of fetal growth restriction/small for gestation age infants. Based on these findings it is recommended that women be initiated on medications, prior to 20 weeks, to keep blood pressures < 140/90 mmHg both pre pregnancy and throughout gestation. We generally recommend home blood pressure monitoring to assist with medication titration as well as to monitor her for the development of preeclampsia.  In the event of new elevations in blood pressures after 20 weeks gestation, we would recommend thorough evaluation for preeclampsia prior to medication dose escalation. We reviewed the relative safety of various antihypertensive classes of medications during pregnancy. Labetalol or long-acting nifedipine safe options for blood pressure control in pregnancy.     Recommendations:  Continue/initiate necessary medications and titrate as needed to achieve blood pressure goal  Baseline studies:   Electrocardiogram, if abnormal this should be followed up with an echocardiogram  Liver function tests, creatinine and blood urea nitrogen, serum electrolytes  Spot protein/creatinine ratio   If the protein/creatine ratio is greater than 0.15 it should be followed up with a 24 hour urine for protein and creatinine  Close monitoring evidence of superimposed preeclampsia  More frequent blood pressure monitoring; she should check her blood pressure weekly until 32 weeks at which point she should check it two times per week  Her blood pressure cuff should be titrated in the office  More frequent prenatal visits are indicated with hypertension when medication modifications are necessary  Calling parameters should be clearly laid out with regards to blood pressure and symptoms  Repeat labs as clinically indicated, with a low threshold to rule-out superimposed preeclampsia, especially if it is  thought that medication may need to be increased  Comprehensive (level II) anatomic ultrasound - completed   Ultrasounds for growth monthly starting at 28 weeks gestation   fetal surveillance with weekly BPP (or NST and MVP) starting at 32 weeks depending on severity of maternal disease- these are ongoing (per FGR issue)   Delivery at 38-39 weeks- this timing will be determined per triplet/FGR issues   Early postpartum visit (within the first week) for blood pressure assessment  Postpartum, medication should be adjusted to maintain the systolic blood pressure below 140  mm Hg and the diastolic blood pressure below 90 mm Hg    Prior Myomectomy requiring     When myomectomy involves the muscular portion of the myometrium, delivery by  is typically recommended due to the risk of uterine rupture. Little is known regarding the impact of the location of the leiomyoma (e.g. transmural versus intramural versus serosal and upper versus lower uterine segment), extent of resection (number excised) on uterine rupture risk.     Risks of uterine rupture include maternal hemorrhage and hypovolemic shock, as well as need for transfusion and emergency laparotomy. Importantly, uterine rupture can occur prior to the onset of labor. After uterine rupture, the fetus is at risk for stillbirth, as well as hypoxia/acidosis and its sequelae. Importantly, uterine rupture in women with prior uterine surgery involving the muscular portion can occur prior to the onset of labor. Early delivery can avert the risk of uterine rupture and its sequelae. While the potential risk for uterine rupture after myomectomy is low, the consequences can be catastrophic. Thus, when  is planned for women with prior myomectomy, the strategy of delivery at 37-38 weeks may be considered, with individualization based on the type and extent of the myomectomy surgery. We also reviewed the risks of late /early term delivery  including but not limited to TTN/RDS.    Recommendations:  Serial scans for growth after 28 weeks gestation to assess growth and placentation  Delivery per FGR and triplet issues     Fatty Liver    Nonalcoholic fatty liver disease (NAFLD) refers to hepatic steatosis when no other cause for hepatic fat accumulation (e.g., alcohol) is present. NAFLD is divided into nonalcoholic fatty liver (NAFL= steatosis without inflammation) and nonalcoholic steatohepatitis (NEWMAN = steatosis with inflammation).  Importantly, NAFLD can progress to cirrhosis.     NAFLD is the most common liver disorder in Western industrialized countries, where the major risk factors for NAFLD (central obesity, type 2 diabetes, dyslipidemia, and metabolic syndrome) are common. The prevalence of NAFLD in the [place] is 10-46%.  Other conditions that have been associated with NAFLD, independent of their associations with obesity, include polycystic ovary syndrome, hypothyroidism, obstructive sleep apnea, hypopituitarism and hypogonadism.  The pathogenesis of NAFLD has not been fully elucidated. The most widely supported theory implicates insulin resistance as the key mechanism.     Patients with NAFLD may be asymptomatic.  They may also have fatigue and right upper quadrant pain.  Ms Norwood was seen last month for RUQ pain and had a normal RUQ ultrasound with slightly elevated LFTS (60/70s).  Findings may include (or not) hepatomegaly and abnormalities in the transaminases, alkaline phosphatase, ferritin concentration or transferrin saturation.      There are multiple other causes of hepatic steatosis that should be considered including, but not limited to: Alcoholic liver disease, Hepatitis C, Andrew disease, Lipodystrophy, Starvation, Medications, Acute fatty liver of pregnancy, HELLP syndrome, Inborn errors of metabolism.  Other disorders that should be considered based upon the patient's history, associated symptoms, and family history include,  thyroid disorders, celiac disease, alpha-1 antitrypsin deficiency, and Budd-Chiari syndrome.    The impact of fatty liver in pregnancy has been evaluated (Kailash et al, 2015) in a birth registry study in [facility] that looked at 110 pregnancies in women with NAFLD.  Women without NAFLD and PCOS were controls.  They found that NAFLD was associated with gestational diabetes (aRR 2.78; 95% CI: 1.25-6.15), preeclampsia (aRR 1.95; 95%  CI 1.03-3.70), caesarean  (aRR 1.52; 95% CI 1.19-1.94),  birth (aRR 2.50;  95% CI 1.38-4.55) and low birth weight (aRR 2.40; 95% CI 1.21-4.78).  These findings were independent of BMI.      Recommendations:  Testing for anti-hepatitis C virus antibody, Hepatitis A IgG, Hepatitis B surface antigen, surface antibody and core antibody if not already done  LFT testing every trimester   Testing that is recommended in NAFLD and may have been done by the patient s gastroenterologist include: plasma iron, ferritin, and total iron binding capacity, serum gammaglobulin level, antinuclear antibody, antismooth muscle antibody, and anti-liver/kidney microsomal antibody-1   Glucose testing at 26-28 weeks- complete as soon as possible   Nutrition counseling if the patient has con-current obesity  Growth ultrasounds  (per triplet recommendations)   Close monitoring for the possible development of preeclampsia  Follow up with GI postpartum for further management     Pregnancy Complicated by Fetal Growth Restriction  Fetal growth restriction (FGR) is defined as estimated fetal weight below the 10th percentile for gestational age.  The etiology of FGR can be classified as maternal, fetal or placental.  Examples of maternal etiologies of FGR include medical conditions such as pre-existing diabetes, lupus, antiphospholipid antibody syndrome, hypertension, certain infections as well as medication/substance use.  Fetal etiologies include multiple gestation, aneuploidy, and structural anomalies.  Placental  or umbilical cord abnormalities can also lead to growth restriction.  Pregnancies with fetal growth restriction are at increased risk of adverse  outcomes, such as fetal demise, especially when the estimated fetal weight is below the 5th percentile.  Other adverse outcomes include hypoglycemia, hyperbilirubienemia, hypothermia, intraventricular hemorrhage, necrotizing enterocolitis, seizures, sepsis, respiratory distress syndrome and  death.  Additionally, some pregnancies are delivered  in the setting of abnormal Doppler studies, lack of interval growth, or abnormal fetal testing on NST or biophysical profile.  Furthermore there is growing evidence that small for gestational age children have increased risks of certain diseases in adulthood.      Recommendations:    Ultrasound surveillance  - Growth US q3w   - Weekly UA DFS   - Weekly modified BPP (NST with DVP/JEROME) testing now until delivery     Timing and mode of delivery  - Primary  delivery by [id number], likely on the earlier side of this range due to FGR, multi-order gestation, history of open and laparoscopic myomectomy- did not enter cavity or was extensive per patient, CHTN, and risk for PTL    Postpartum management   - Follow up with GI for fatty liver disease   - Close monitoring of BP   - Otherwise routine PP care     The patient was seen and evaluated with Dr. Flynn Wayne.    At the end of our discussion, Ms. Norwood indicated that her questions were answered and she seemed satisfied with our discussion.  Thank you for allowing us to participate in the care of your patient. Please do not hesitate to contact us if you have further questions regarding the management of your patient.     Marisol Alonzo MD   Maternal Fetal Medicine Fellow          Physician Attestation   IFlynn MD, saw and evaluated Murali Norwood with the resident/fellow.      I have reviewed and discussed with Dr. Alonzo the  patient history, physical exam and plan of care. I personally reviewed the vital signs, medications, lab results and imaging.    Key history or physical exam findings: triplet pregnancy trichorionic, history of myomectomy, no transmural or extensive dissection, history of NAFLD, stable AST/ALT and currently FGR fetus 3.    Key management decisions made: surveillance and timing of delivery.  Recommend CD at 34 to 36 6/7 weeks or at onset of labor.    Flynn Wayne MD  Date of Service (when I saw the patient): 08/06/24    Time Spent on this Encounter   I personally spent a total of 60 minutes, including both face-to-face and non-face-to-face on the date of the encounter, addressing the above diagnosis. Activities performed in this time include chart review, obtaining / reviewing history, performing a medically necessary evaluation, documentation and Charge activities: counseling, care coordination, ordering tests, ordering meds, and reviewing results, equivalent to medical decision making that is of high complexity.

## 2024-08-06 ENCOUNTER — OFFICE VISIT (OUTPATIENT)
Dept: MATERNAL FETAL MEDICINE | Facility: CLINIC | Age: 44
End: 2024-08-06
Attending: ADVANCED PRACTICE MIDWIFE
Payer: MEDICAID

## 2024-08-06 ENCOUNTER — APPOINTMENT (OUTPATIENT)
Dept: LAB | Facility: CLINIC | Age: 44
End: 2024-08-06
Attending: OBSTETRICS & GYNECOLOGY
Payer: MEDICAID

## 2024-08-06 ENCOUNTER — HOSPITAL ENCOUNTER (OUTPATIENT)
Dept: ULTRASOUND IMAGING | Facility: CLINIC | Age: 44
Discharge: HOME | End: 2024-08-06
Attending: OBSTETRICS & GYNECOLOGY
Payer: MEDICAID

## 2024-08-06 VITALS
BODY MASS INDEX: 38.57 KG/M2 | SYSTOLIC BLOOD PRESSURE: 125 MMHG | WEIGHT: 210.9 LBS | OXYGEN SATURATION: 99 % | RESPIRATION RATE: 20 BRPM | HEART RATE: 86 BPM | DIASTOLIC BLOOD PRESSURE: 84 MMHG

## 2024-08-06 DIAGNOSIS — E74.39 GLUCOSE INTOLERANCE: Primary | ICD-10-CM

## 2024-08-06 DIAGNOSIS — O09.523 MULTIGRAVIDA OF ADVANCED MATERNAL AGE IN THIRD TRIMESTER: ICD-10-CM

## 2024-08-06 DIAGNOSIS — O10.919 CHRONIC HYPERTENSION AFFECTING PREGNANCY: ICD-10-CM

## 2024-08-06 DIAGNOSIS — O30.139 TRICHORIONIC TRIAMNIOTIC TRIPLET PREGNANCY, ANTEPARTUM: ICD-10-CM

## 2024-08-06 LAB
ERYTHROCYTE [DISTWIDTH] IN BLOOD BY AUTOMATED COUNT: 16.5 % (ref 10–15)
GLUCOSE 1H P 50 G GLC PO SERPL-MCNC: 131 MG/DL (ref 70–129)
HCT VFR BLD AUTO: 32.8 % (ref 35–47)
HGB BLD-MCNC: 10.4 G/DL (ref 11.7–15.7)
HIV 1+2 AB+HIV1 P24 AG SERPL QL IA: NONREACTIVE
MCH RBC QN AUTO: 30 PG (ref 26.5–33)
MCHC RBC AUTO-ENTMCNC: 31.7 G/DL (ref 31.5–36.5)
MCV RBC AUTO: 95 FL (ref 78–100)
PLATELET # BLD AUTO: 246 10E3/UL (ref 150–450)
RBC # BLD AUTO: 3.47 10E6/UL (ref 3.8–5.2)
T PALLIDUM AB SER QL: NONREACTIVE
WBC # BLD AUTO: 7 10E3/UL (ref 4–11)

## 2024-08-06 PROCEDURE — 76820 UMBILICAL ARTERY ECHO: CPT | Mod: 26 | Performed by: OBSTETRICS & GYNECOLOGY

## 2024-08-06 PROCEDURE — 99417 PROLNG OP E/M EACH 15 MIN: CPT | Mod: 25 | Performed by: OBSTETRICS & GYNECOLOGY

## 2024-08-06 PROCEDURE — 82950 GLUCOSE TEST: CPT | Performed by: OBSTETRICS & GYNECOLOGY

## 2024-08-06 PROCEDURE — 76820 UMBILICAL ARTERY ECHO: CPT | Mod: 59

## 2024-08-06 PROCEDURE — 59025 FETAL NON-STRESS TEST: CPT | Mod: 26 | Performed by: OBSTETRICS & GYNECOLOGY

## 2024-08-06 PROCEDURE — 76815 OB US LIMITED FETUS(S): CPT | Mod: 26 | Performed by: OBSTETRICS & GYNECOLOGY

## 2024-08-06 PROCEDURE — 86780 TREPONEMA PALLIDUM: CPT | Performed by: OBSTETRICS & GYNECOLOGY

## 2024-08-06 PROCEDURE — 36415 COLL VENOUS BLD VENIPUNCTURE: CPT | Performed by: OBSTETRICS & GYNECOLOGY

## 2024-08-06 PROCEDURE — 85041 AUTOMATED RBC COUNT: CPT | Performed by: OBSTETRICS & GYNECOLOGY

## 2024-08-06 PROCEDURE — 87389 HIV-1 AG W/HIV-1&-2 AB AG IA: CPT | Performed by: OBSTETRICS & GYNECOLOGY

## 2024-08-06 PROCEDURE — G0463 HOSPITAL OUTPT CLINIC VISIT: HCPCS | Mod: 25 | Performed by: OBSTETRICS & GYNECOLOGY

## 2024-08-06 PROCEDURE — 99215 OFFICE O/P EST HI 40 MIN: CPT | Mod: 25 | Performed by: OBSTETRICS & GYNECOLOGY

## 2024-08-06 RX ORDER — LABETALOL 100 MG/1
100 TABLET, FILM COATED ORAL 3 TIMES DAILY
Qty: 90 TABLET | Refills: 1 | Status: ON HOLD | OUTPATIENT
Start: 2024-08-06 | End: 2024-09-13

## 2024-08-06 RX ORDER — PRENATAL VIT/IRON FUM/FOLIC AC 27MG-0.8MG
1 TABLET ORAL DAILY
Qty: 90 TABLET | Refills: 3 | Status: SHIPPED | OUTPATIENT
Start: 2024-08-06

## 2024-08-06 ASSESSMENT — PATIENT HEALTH QUESTIONNAIRE - PHQ9: SUM OF ALL RESPONSES TO PHQ QUESTIONS 1-9: 9

## 2024-08-06 ASSESSMENT — PAIN SCALES - GENERAL: PAINLEVEL: NO PAIN (0)

## 2024-08-06 NOTE — NURSING NOTE
Murali seen in clinic today for 1st OB visit and ltd with UAR U/S with triplets at 29w3d gestation. VSS. Pt reports + fetal movement, see flowsheet. Pt had NST done and was equivical, not able to get enough monitoring time to be reactive. Pt was complaining of back pain and couldn't tolerate any further external monitoring today. Pt evaluated for  labor, preeclampsia, vaginal bleeding, infection, fetal wellbeing  without concerns. Pt has apt to come back next week for U/S and monitoring. Pt had SVE done per Dr. Rios and was closed. Pt denies bleeding/lof/change in vaginal discharge/contractions/headache/vision changes/chest pain/SOB/edema. Pt notified to review new pt education in AVS, verbally reviewed highlights. Dr. Wayne and Dr. Rios met with pt and discussed POC. Pt to lab for GCT and 3rd trimester labs. Called pt to verify dose of Labetalol. Pt take 100mg Labetalol TID and and pt also informed of abnormal GCT of 131. Pt agrees to do 3hr GTT on 24.   Future visits scheduled at . Pt discharged stable and ambulatory.

## 2024-08-09 ENCOUNTER — VIRTUAL VISIT (OUTPATIENT)
Dept: BEHAVIORAL HEALTH | Facility: CLINIC | Age: 44
End: 2024-08-09
Attending: OBSTETRICS & GYNECOLOGY
Payer: MEDICAID

## 2024-08-09 DIAGNOSIS — F43.21 ADJUSTMENT DISORDER WITH DEPRESSED MOOD: Primary | ICD-10-CM

## 2024-08-09 PROCEDURE — 90791 PSYCH DIAGNOSTIC EVALUATION: CPT | Mod: 95 | Performed by: COUNSELOR

## 2024-08-09 ASSESSMENT — PATIENT HEALTH QUESTIONNAIRE - PHQ9
SUM OF ALL RESPONSES TO PHQ QUESTIONS 1-9: 9
10. IF YOU CHECKED OFF ANY PROBLEMS, HOW DIFFICULT HAVE THESE PROBLEMS MADE IT FOR YOU TO DO YOUR WORK, TAKE CARE OF THINGS AT HOME, OR GET ALONG WITH OTHER PEOPLE: SOMEWHAT DIFFICULT
SUM OF ALL RESPONSES TO PHQ QUESTIONS 1-9: 9

## 2024-08-09 NOTE — Clinical Note
Murali Ibarra Dr. completed intake appt for therapy. She was pretty guarded and did not go into much details. She is interested in getting support with SNAP and WIC. Is there a  at the clinic at can help her with this?   Thank you, Promise Green MA, [facility]
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
## 2024-08-12 ENCOUNTER — HOSPITAL ENCOUNTER (OUTPATIENT)
Dept: ULTRASOUND IMAGING | Facility: CLINIC | Age: 44
Discharge: HOME | End: 2024-08-12
Attending: STUDENT IN AN ORGANIZED HEALTH CARE EDUCATION/TRAINING PROGRAM
Payer: MEDICAID

## 2024-08-12 ENCOUNTER — OFFICE VISIT (OUTPATIENT)
Dept: MATERNAL FETAL MEDICINE | Facility: CLINIC | Age: 44
End: 2024-08-12
Attending: STUDENT IN AN ORGANIZED HEALTH CARE EDUCATION/TRAINING PROGRAM
Payer: MEDICAID

## 2024-08-12 DIAGNOSIS — O09.523 MULTIGRAVIDA OF ADVANCED MATERNAL AGE IN THIRD TRIMESTER: ICD-10-CM

## 2024-08-12 DIAGNOSIS — O30.133 TRICHORIONIC TRIAMNIOTIC TRIPLET PREGNANCY IN THIRD TRIMESTER: ICD-10-CM

## 2024-08-12 PROCEDURE — 76815 OB US LIMITED FETUS(S): CPT

## 2024-08-12 PROCEDURE — 59025 FETAL NON-STRESS TEST: CPT | Mod: 26 | Performed by: STUDENT IN AN ORGANIZED HEALTH CARE EDUCATION/TRAINING PROGRAM

## 2024-08-12 PROCEDURE — 76815 OB US LIMITED FETUS(S): CPT | Mod: 26 | Performed by: STUDENT IN AN ORGANIZED HEALTH CARE EDUCATION/TRAINING PROGRAM

## 2024-08-12 PROCEDURE — 76820 UMBILICAL ARTERY ECHO: CPT | Mod: 26 | Performed by: STUDENT IN AN ORGANIZED HEALTH CARE EDUCATION/TRAINING PROGRAM

## 2024-08-12 NOTE — NURSING NOTE
Misbah here with her partner for limited U/S with UAR and NST due to preg c/b tri/tri triplets with triplet 3 FgR. Babies were reactive x 3 and dopplers WNL. Pt scheduled to come back in 1 week and left amb and stable. Manisha Jaime RN

## 2024-08-12 NOTE — PROGRESS NOTES
The patient was seen for an ultrasound in the [facility] today.  For a detailed report of the ultrasound examination, please see the ultrasound report which can be found under the imaging tab.     If you have questions regarding today's evaluation or if we can be of further service, please contact the Maternal-Fetal Medicine Center.    Elva Quinones MD  Maternal Fetal Medicine

## 2024-08-16 ENCOUNTER — LAB (OUTPATIENT)
Dept: LAB | Facility: CLINIC | Age: 44
End: 2024-08-16
Payer: MEDICAID

## 2024-08-16 DIAGNOSIS — E74.39 GLUCOSE INTOLERANCE: ICD-10-CM

## 2024-08-16 LAB
GESTATIONAL GTT 1 HR POST DOSE: 141 MG/DL (ref 60–179)
GESTATIONAL GTT 2 HR POST DOSE: 151 MG/DL (ref 60–154)
GESTATIONAL GTT 3 HR POST DOSE: 119 MG/DL (ref 60–139)
GLUCOSE P FAST SERPL-MCNC: 86 MG/DL (ref 60–94)
HOLD SPECIMEN: NORMAL

## 2024-08-16 PROCEDURE — 82951 GLUCOSE TOLERANCE TEST (GTT): CPT

## 2024-08-16 PROCEDURE — 82950 GLUCOSE TEST: CPT

## 2024-08-16 PROCEDURE — 82947 ASSAY GLUCOSE BLOOD QUANT: CPT

## 2024-08-16 PROCEDURE — 36415 COLL VENOUS BLD VENIPUNCTURE: CPT

## 2024-08-22 ENCOUNTER — OFFICE VISIT (OUTPATIENT)
Dept: MATERNAL FETAL MEDICINE | Facility: CLINIC | Age: 44
End: 2024-08-22
Attending: OBSTETRICS & GYNECOLOGY
Payer: MEDICAID

## 2024-08-22 ENCOUNTER — HOSPITAL ENCOUNTER (OUTPATIENT)
Dept: ULTRASOUND IMAGING | Facility: CLINIC | Age: 44
Discharge: HOME | End: 2024-08-22
Attending: OBSTETRICS & GYNECOLOGY
Payer: MEDICAID

## 2024-08-22 VITALS
HEART RATE: 75 BPM | SYSTOLIC BLOOD PRESSURE: 136 MMHG | DIASTOLIC BLOOD PRESSURE: 87 MMHG | WEIGHT: 212.2 LBS | BODY MASS INDEX: 38.81 KG/M2 | RESPIRATION RATE: 18 BRPM | OXYGEN SATURATION: 97 %

## 2024-08-22 DIAGNOSIS — O09.523 MULTIGRAVIDA OF ADVANCED MATERNAL AGE IN THIRD TRIMESTER: ICD-10-CM

## 2024-08-22 DIAGNOSIS — O10.919 CHRONIC HYPERTENSION AFFECTING PREGNANCY: ICD-10-CM

## 2024-08-22 DIAGNOSIS — O30.133 TRICHORIONIC TRIAMNIOTIC TRIPLET PREGNANCY IN THIRD TRIMESTER: ICD-10-CM

## 2024-08-22 DIAGNOSIS — O30.133 TRICHORIONIC TRIAMNIOTIC TRIPLET PREGNANCY IN THIRD TRIMESTER: Primary | ICD-10-CM

## 2024-08-22 DIAGNOSIS — O36.5933: Primary | ICD-10-CM

## 2024-08-22 DIAGNOSIS — O26.90 PREGNANCY RELATED CONDITION, ANTEPARTUM: ICD-10-CM

## 2024-08-22 PROCEDURE — 76818 FETAL BIOPHYS PROFILE W/NST: CPT | Mod: 26 | Performed by: OBSTETRICS & GYNECOLOGY

## 2024-08-22 PROCEDURE — 76816 OB US FOLLOW-UP PER FETUS: CPT | Mod: 59

## 2024-08-22 PROCEDURE — 87653 STREP B DNA AMP PROBE: CPT | Performed by: OBSTETRICS & GYNECOLOGY

## 2024-08-22 PROCEDURE — 99214 OFFICE O/P EST MOD 30 MIN: CPT | Mod: 25 | Performed by: OBSTETRICS & GYNECOLOGY

## 2024-08-22 PROCEDURE — 59025 FETAL NON-STRESS TEST: CPT | Mod: 59 | Performed by: OBSTETRICS & GYNECOLOGY

## 2024-08-22 PROCEDURE — 76816 OB US FOLLOW-UP PER FETUS: CPT | Mod: 26 | Performed by: OBSTETRICS & GYNECOLOGY

## 2024-08-22 PROCEDURE — 76818 FETAL BIOPHYS PROFILE W/NST: CPT | Mod: 59

## 2024-08-22 PROCEDURE — 59025 FETAL NON-STRESS TEST: CPT | Mod: 26 | Performed by: OBSTETRICS & GYNECOLOGY

## 2024-08-22 PROCEDURE — 76820 UMBILICAL ARTERY ECHO: CPT | Mod: 26 | Performed by: OBSTETRICS & GYNECOLOGY

## 2024-08-22 PROCEDURE — G0463 HOSPITAL OUTPT CLINIC VISIT: HCPCS | Mod: 25 | Performed by: OBSTETRICS & GYNECOLOGY

## 2024-08-22 NOTE — PATIENT INSTRUCTIONS
"Weeks 32 to 34 of Your Pregnancy: Care Instructions    Decide whether you want to bank or donate your baby's umbilical cord blood. If you want to save this blood, you have to arrange for it ahead of time.   Decide about circumcision. Personal, Muslim, or cultural beliefs may play a role in your decision. You get to decide what you want for your baby.         Learn how to ease hemorrhoids.   Get more liquids, fruits, vegetables, and fiber in your diet.  Avoid sitting for too long.  Clean yourself with moist toilet paper. Or try witch hazel pads.  Try ice packs or warm sitz baths for discomfort.  Use hydrocortisone cream for pain or itching.  Ask your doctor about stool softeners.        Consider the benefits of breastfeeding.   It reduces your baby's risk of sudden infant death syndrome (SIDS).   babies are less likely to get certain infections. And they're less likely to be obese or get diabetes later in life.  It can lower your risk of breast and ovarian cancers and osteoporosis.  It saves you money.  Follow-up care is a key part of your treatment and safety. Be sure to make and go to all appointments, and call your doctor if you are having problems. It's also a good idea to know your test results and keep a list of the medicines you take.  Where can you learn more?  Go to https://www.Pav[employer].net/patiented  Enter X711 in the search box to learn more about \"Weeks 32 to 34 of Your Pregnancy: Care Instructions.\"  Current as of: July 10, 2023  Content Version: 14.1 2006-2024 [employer].   Care instructions adapted under license by your healthcare professional. If you have questions about a medical condition or this instruction, always ask your healthcare professional. Healthwise, [employer] disclaims any warranty or liability for your use of this information.    "

## 2024-08-22 NOTE — PROGRESS NOTES
"    Maternal-Fetal Medicine Return OB Visit     Murali Norwood  : 1980  MRN: [id number]    Subjective     Feeling well today. Denies contractions, leaking of fluid, vaginal bleeding, or decreased fetal movement. No preeclampsia symptoms     OB History    Para Term  AB Living   3 2 2 0 0 2   SAB IAB Ectopic Multiple Live Births   0 0 0 0 2      # Outcome Date GA Lbr Lauro/2nd Weight Sex Type Anes PTL Lv   3 Current            2 Term            1 Term              Current Outpatient Medications   Medication Sig Dispense Refill    ferrous sulfate (FEROSUL) 325 (65 Fe) MG tablet Take 325 mg by mouth daily (with breakfast)      labetalol (NORMODYNE) 100 MG tablet Take 1 tablet (100 mg) by mouth 3 times daily 90 tablet 1    labetalol (NORMODYNE) 100 MG tablet Take 100 mg by mouth 3 times daily      Prenatal Vit-Fe Fumarate-FA (PRENATAL MULTIVITAMIN  PLUS IRON) 27-1 MG TABS Take by mouth daily      Prenatal Vit-Fe Fumarate-FA (PRENATAL MULTIVITAMIN W/IRON) 27-0.8 MG tablet Take 1 tablet by mouth daily 90 tablet 3     No current facility-administered medications for this visit.         Objective   /87 (BP Location: Left arm, Patient Position: Sitting, Cuff Size: Adult Large)   Pulse 75   Resp 18   Wt 96.3 kg (212 lb 3.2 oz)   SpO2 97%   BMI 38.81 kg/m      Abd: Gravid, non-tender  Ext: No edema    Ultrasound   See today's ultrasound report under the \"imaging\" tab.    [facility] US COMPREHENSIVE SINGLE 2024  GA 28w5d   Fetus 1: EFW 1177g (19%). AC 10%. MVP 5.3 cm. Normal UAR doppler PI. Reactive NST without decelerations.  Fetus 2: EFW 1115g (15%). AC 14%. MVP 4.5 cm. Normal UAR doppler PI. Reactive NST without decelerations.  Fetus 3: EFW 1035g (5%). AC 2%. MVP 5.4 cm. Normal UAR doppler PI. Reactive NST without decelerations.    [facility] US OB LIMITED MULTIPLE 2024  GA 30w2d   Fetus 1: MVP 6.4 cm. The umbilical artery Dopplers were normal. The fetal heart rate monitoring was appropriate " for gestational age.  Fetus 2: MVP 6.1 cm. The umbilical artery Dopplers were normal. The fetal heart rate monitoring was appropriate for gestational age.  Fetus 3: MVP 5.8 cm. The umbilical artery Dopplers were normal. The fetal heart rate monitoring was appropriate for gestational age.    [facility]FM US COMPREHENSIVE MULTIPLE FOLLOW-UP  2024  GA 31w5d  Fetus 1: EFW 1700g (22%). AC 11%. MVP 4.0 cm. Cephalic, maternal right. NST reactive, UA Doppler normal.  Fetus 2: EFW 1794g (34%). AC 19%. MVP 7.0 cm. Cephalic maternal midline. NST nonreactive. UA Doppler normal. BPP 8/10  Fetus 3: EFW 1405g (3%). AC <1%. MVP 5.0 cm.. Transverse, head to maternal left  UA Doppler normal. NST reactive    Other Imaging     See imaging tab for today's ultrasound     Labs     None     Assessment/Plan     Murali Norwood is a 44 year old  at 31w5d  by self stated YVONNE consistent with 28w5d US with trichorionic triamniotic triplet gestation, here for follow-up OB visit.    Pregnancy complicated by:     Maternal:   - cHTN  - hx of fatty liver disease  - hx of myomectomies   - AMA    Fetal:   - tri-tri triplet gestation  - FGR of fetus 3    cHTN  - on labetalol 100 mg TID   - baseline HELLP labs show mild elevation in ALT, AST but not twice normal levels   - reviewed BP goal <140/90; if above goal, then will reevaluate for superimposed preeclampsia before increasing PO antihypertensive dose  - reviewed symptomatic precautions for superimposed preeclampsia      History of fatty liver disease  - will continue to observe      Surveillance  - weekly UAR and NST for FGR of fetus 3  - q3week growth ultrasounds (last today)    Delivery planning  - Delivery is planned at 34 weeks by CS  - Feeding: discuss at future visit  - Contraception plans: discuss at future visit    Routine PNC  - Prenatal labs 7/10:  Rh pos (O pos), antibody negative  Hgb 9.7 MCV 91 Plt 256K   HepBAg/HIV/RPR/HepCAb: nonreactive   Rubella: immune     UC:  <10,000 CFU/mL Mixture of Urogenital Anabel                AST 65 ALT 74 Cr 0.56 UPC 0.23  - Immunizations: Tdap offered today   - Aneuploidy screening: declines (s/p GC consult 8/1)  - Hgb 10.4, RPR NR (8/6)  - 1 hour GCT 131H (8/6); 3 hr GTT 86/141/151/119 nl (8/16)    Apolinar Coon MD  Maternal-Fetal Medicine

## 2024-08-22 NOTE — NURSING NOTE
Murali seen in clinic today for OB visit at 31w5d gestation. VSS. Pt reports good fetal movement, see flowsheet. Pt evaluated for  labor, preeclampsia, vaginal bleeding, infection, fetal wellbeing without concerns. Pt denies bleeding/lof/change in vaginal discharge/contractions/headache/vision changes/chest pain/SOB/edema. Pt notified to review new pt education in AVS, verbally reviewed highlights. Dr. Coon met with pt and discussed POC. Plan for repeat testing in one week. C/S scheduled for 24 at 0830, hibiclens given with instructions if she goes into labor or water breaks. GBS collected today. Hibiclens given today. Future visits scheduled at . Pt discharged stable and ambulatory.        Emmy Contreras RN on 2024 at 1:00 PM

## 2024-08-22 NOTE — PROGRESS NOTES
"Please see \"Imaging\" tab under \"Chart Review\" for details of today's US at the [facility].    Apolinar Coon MD  Maternal-Fetal Medicine    "

## 2024-08-22 NOTE — NURSING NOTE
Patient reports good fetal movement,  denies contractions, leaking of fluid, or bleeding.  SBAR given to KATLIN PHELPS, see their note in Epic.

## 2024-08-23 ENCOUNTER — VIRTUAL VISIT (OUTPATIENT)
Dept: BEHAVIORAL HEALTH | Facility: CLINIC | Age: 44
End: 2024-08-23
Attending: OBSTETRICS & GYNECOLOGY
Payer: MEDICAID

## 2024-08-23 DIAGNOSIS — F43.21 ADJUSTMENT DISORDER WITH DEPRESSED MOOD: Primary | ICD-10-CM

## 2024-08-23 LAB — GP B STREP DNA SPEC QL NAA+PROBE: NEGATIVE

## 2024-08-23 PROCEDURE — 90832 PSYTX W PT 30 MINUTES: CPT | Mod: 95 | Performed by: COUNSELOR

## 2024-08-23 ASSESSMENT — ANXIETY QUESTIONNAIRES
GAD7 TOTAL SCORE: 3
8. IF YOU CHECKED OFF ANY PROBLEMS, HOW DIFFICULT HAVE THESE MADE IT FOR YOU TO DO YOUR WORK, TAKE CARE OF THINGS AT HOME, OR GET ALONG WITH OTHER PEOPLE?: NOT DIFFICULT AT ALL
GAD7 TOTAL SCORE: 3
GAD7 TOTAL SCORE: 3
5. BEING SO RESTLESS THAT IT IS HARD TO SIT STILL: NOT AT ALL
6. BECOMING EASILY ANNOYED OR IRRITABLE: NOT AT ALL
IF YOU CHECKED OFF ANY PROBLEMS ON THIS QUESTIONNAIRE, HOW DIFFICULT HAVE THESE PROBLEMS MADE IT FOR YOU TO DO YOUR WORK, TAKE CARE OF THINGS AT HOME, OR GET ALONG WITH OTHER PEOPLE: NOT DIFFICULT AT ALL
7. FEELING AFRAID AS IF SOMETHING AWFUL MIGHT HAPPEN: NOT AT ALL
7. FEELING AFRAID AS IF SOMETHING AWFUL MIGHT HAPPEN: NOT AT ALL
3. WORRYING TOO MUCH ABOUT DIFFERENT THINGS: NEARLY EVERY DAY
2. NOT BEING ABLE TO STOP OR CONTROL WORRYING: NOT AT ALL
1. FEELING NERVOUS, ANXIOUS, OR ON EDGE: NOT AT ALL
4. TROUBLE RELAXING: NOT AT ALL

## 2024-08-23 NOTE — PROGRESS NOTES
[facility]                                     Progress Note    Patient Name: Murali Norwood  Date: 8/23/2024      Service Type: Individual      Session Start Time: 1:40p  Session End Time: 2:00p     Session Length: 20 mins    Session #: 1    Attendees: Client attended alone    Service Modality:  Video Visit      Provider verified identity through the following two step process.  Patient provided:  Patient is known previously to provider    Telemedicine Visit: The patient's condition can be safely assessed and treated via synchronous audio and visual telemedicine encounter.      Reason for Telemedicine Visit: Patient has requested telehealth visit    Originating Site (Patient Location): Patient's home    Distant Site (Provider Location): Provider Remote Setting - Home Office    Consent:  The patient/guardian has verbally consented to: the potential risks and benefits of telemedicine (video visit) versus in person care; bill my insurance or make self-payment for services provided; and responsibility for payment of non-covered services.     Patient would like the video invitation sent by:  My Chart    Mode of Communication:  Video Conference via [employer]    Distant Location (Provider):  On-site    As the provider I attest to compliance with applicable laws and regulations related to telemedicine.    DATA  Interactive Complexity: No  Crisis: No     Progress Since Last Session (Related to Symptoms / Goals / Homework):   Symptoms: No change - stable    Homework: n/a      Episode of Care Goals: Minimal progress - PREPARATION (Decided to change - considering how); Intervened by negotiating a change plan and determining options / strategies for behavior change, identifying triggers, exploring social supports, and working towards setting a date to begin behavior change     Current / Ongoing Stressors and Concerns:   Reported feeling like she is managing stress well, doing what she can, reported her  main source of concern is not knowing how she will be able to provide for the triplets as she is not working and spouse only works part time, patient feels her mood is stable overall.      Treatment Objective(s) Addressed in This Session:   In progress     Intervention:   Motivational Interviewing: evoke change talk and challenge sustain talk, assess readiness and confidence to change, point out discrepancies, explore ambivalence and road blocks to change; CBT: identify patterns of self defeating thoughts and behaviors, identify concerns for therapy     Assessments completed prior to visit:  The following assessments were completed by patient for this visit:  PHQ9:       8/6/2024    12:00 PM 8/9/2024    12:54 PM   PHQ-9 SCORE   PHQ-9 Total Score MyChart  9 (Mild depression)   PHQ-9 Total Score 9 9     GAD7:       8/23/2024     1:38 PM   GAYATRI-7 SCORE   Total Score 3 (minimal anxiety)   Total Score 3     PROMIS 10-Global Health (only subscores and total score):       8/9/2024     1:06 PM 8/23/2024     1:39 PM   PROMIS-10 Scores Only   Global Mental Health Score 9 12   Global Physical Health Score 8 12   PROMIS TOTAL - SUBSCORES 17 24      ASSESSMENT: Current Emotional / Mental Status (status of significant symptoms):   Risk status (Self / Other harm or suicidal ideation)   Patient denies current fears or concerns for personal safety.   Patient denies current or recent suicidal ideation or behaviors.   Patient denies current or recent homicidal ideation or behaviors.   Patient denies current or recent self injurious behavior or ideation.   Patient denies other safety concerns.   Patient reports there has been no change in risk factors since their last session.     Patient reports there has been no change in protective factors since their last session.     Recommended that patient call 911 or go to the local ED should there be a change in any of these risk factors.     Appearance:   Appropriate    Eye Contact:   Good     Psychomotor Behavior: Normal    Attitude:   Cooperative    Orientation:   Situation All   Speech    Rate / Production: Normal     Volume:  Normal    Mood:    Anxious  Normal   Affect:    Appropriate    Thought Content:  Clear    Thought Form:  Coherent  Goal Directed  Logical    Insight:    Good      Medication Review:   No changes to current psychiatric medication(s)     Medication Compliance:   Yes     Changes in Health Issues:   None reported     Chemical Use Review:   Substance Use: Chemical use reviewed, no active concerns identified      Tobacco Use: No current tobacco use.      Diagnosis:  1. Adjustment disorder with depressed mood      Collateral Reports Completed:   n/a    PLAN: (Patient Tasks / Therapist Tasks / Other)  Communicate financial concerns with providers.         [facility]                                                         ______________________________________________________________________    Individual Treatment Plan    Patient's Name: Murali Norwood  YOB: 1980    Date of Creation: 8/23/2024  Date Treatment Plan Last Reviewed/Revised: 8/23/2024    DSM5 Diagnoses: Adjustment Disorders  309.0 (F43.21) With depressed mood  Psychosocial / Contextual Factors: Partnered, lives with partner, currently employed, pregnant with triples, 19 yo and 18 yo living in  with family, has family in  and [facility], moved to [place] 7/2024 to stay with  due to high risk pregnancy, lack of support   PROMIS (reviewed every 90 days): See flowsheets    Referral / Collaboration:  The following referral(s) will be initiated: Contacted care team about clinical care coordination referral .    Anticipated number of session for this episode of care: 3-6 sessions  Anticipation frequency of session: Biweekly  Anticipated Duration of each session: 38-52 minutes  Treatment plan will be reviewed in 90 days or when goals have been changed.     MeasurableTreatment Goal(s) related to  diagnosis / functional impairment(s)  Goal 1: Patient will better manage adjustment stressors.    I will know I've met my goal when I have a plan for supporting my babies.      Objective #A (Patient Action)    Patient will identify 3+ strategies to more effectively address stressors.  Status: New - Date: 8/23/2024      Intervention(s)  Therapist will teach assign homework of self care practice; provide educational materials on stress management skills; role-play conflict management; teach the client how to perform a behavioral chain analysis.        Patient will have access to plan via [employer].      ANA Roth  August 23, 2024

## 2024-08-26 ENCOUNTER — OFFICE VISIT (OUTPATIENT)
Dept: MATERNAL FETAL MEDICINE | Facility: CLINIC | Age: 44
End: 2024-08-26
Attending: STUDENT IN AN ORGANIZED HEALTH CARE EDUCATION/TRAINING PROGRAM
Payer: MEDICAID

## 2024-08-26 ENCOUNTER — HOSPITAL ENCOUNTER (OUTPATIENT)
Dept: ULTRASOUND IMAGING | Facility: CLINIC | Age: 44
Discharge: HOME | End: 2024-08-26
Attending: STUDENT IN AN ORGANIZED HEALTH CARE EDUCATION/TRAINING PROGRAM
Payer: MEDICAID

## 2024-08-26 ENCOUNTER — DOCUMENTATION ONLY (OUTPATIENT)
Dept: CARE COORDINATION | Facility: CLINIC | Age: 44
End: 2024-08-26
Payer: MEDICAID

## 2024-08-26 ENCOUNTER — TRANSCRIBE ORDERS (OUTPATIENT)
Dept: MATERNAL FETAL MEDICINE | Facility: CLINIC | Age: 44
End: 2024-08-26
Payer: MEDICAID

## 2024-08-26 VITALS
RESPIRATION RATE: 20 BRPM | OXYGEN SATURATION: 97 % | DIASTOLIC BLOOD PRESSURE: 88 MMHG | WEIGHT: 210 LBS | SYSTOLIC BLOOD PRESSURE: 127 MMHG | HEART RATE: 90 BPM | BODY MASS INDEX: 38.41 KG/M2

## 2024-08-26 DIAGNOSIS — O30.133 TRICHORIONIC TRIAMNIOTIC TRIPLET PREGNANCY IN THIRD TRIMESTER: ICD-10-CM

## 2024-08-26 DIAGNOSIS — O09.93 SUPERVISION OF HIGH RISK PREGNANCY IN THIRD TRIMESTER: Primary | ICD-10-CM

## 2024-08-26 DIAGNOSIS — O36.5933: ICD-10-CM

## 2024-08-26 DIAGNOSIS — O26.90 PREGNANCY RELATED CONDITION, ANTEPARTUM: ICD-10-CM

## 2024-08-26 DIAGNOSIS — O09.523 MULTIGRAVIDA OF ADVANCED MATERNAL AGE IN THIRD TRIMESTER: ICD-10-CM

## 2024-08-26 DIAGNOSIS — O10.919 CHRONIC HYPERTENSION AFFECTING PREGNANCY: ICD-10-CM

## 2024-08-26 DIAGNOSIS — O26.90 PREGNANCY RELATED CONDITION, ANTEPARTUM: Primary | ICD-10-CM

## 2024-08-26 PROCEDURE — 99214 OFFICE O/P EST MOD 30 MIN: CPT | Mod: 25 | Performed by: ADVANCED PRACTICE MIDWIFE

## 2024-08-26 PROCEDURE — 76820 UMBILICAL ARTERY ECHO: CPT | Mod: 26 | Performed by: STUDENT IN AN ORGANIZED HEALTH CARE EDUCATION/TRAINING PROGRAM

## 2024-08-26 PROCEDURE — G0463 HOSPITAL OUTPT CLINIC VISIT: HCPCS | Mod: 25 | Performed by: ADVANCED PRACTICE MIDWIFE

## 2024-08-26 PROCEDURE — 76818 FETAL BIOPHYS PROFILE W/NST: CPT

## 2024-08-26 PROCEDURE — 99203 OFFICE O/P NEW LOW 30 MIN: CPT | Performed by: PEDIATRICS

## 2024-08-26 PROCEDURE — G0463 HOSPITAL OUTPT CLINIC VISIT: HCPCS | Mod: 25,27 | Performed by: PEDIATRICS

## 2024-08-26 PROCEDURE — 76818 FETAL BIOPHYS PROFILE W/NST: CPT | Mod: 26 | Performed by: STUDENT IN AN ORGANIZED HEALTH CARE EDUCATION/TRAINING PROGRAM

## 2024-08-26 ASSESSMENT — PAIN SCALES - GENERAL: PAINLEVEL: NO PAIN (0)

## 2024-08-26 NOTE — PROGRESS NOTES
"Maternal fetal Medicine OB Follow up visit.     Murali Norwood  : 1980  MRN: [id number]    CC: OB Follow-up    Subjective:  Murali Norwood is a 44 year old  at 32w2d presenting for routine OB follow-up. Today, she is here with her . She attested to feeling some mild contractions with mild lower back discomfort at times. Denies loss of fluid or vaginal bleeding. Reports fetal movement x3.      Patient also denies any recent fevers/chills, headaches or changes in vision, RUQ pain, nausea or vomiting, constipation, diarrhea or other systemic symptoms.      OB Hx:  OB History    Para Term  AB Living   3 2 2 0 0 2   SAB IAB Ectopic Multiple Live Births   0 0 0 0 2      # Outcome Date GA Lbr Lauro/2nd Weight Sex Type Anes PTL Lv   3 Current            2 Term            1 Term                  Objective:  /88 (BP Location: Left arm, Patient Position: Sitting, Cuff Size: Adult Large)   Pulse 90   Resp 20   Wt 95.3 kg (210 lb)   SpO2 97%   BMI 38.41 kg/m      Gen: alert, oriented, NAD  Skin: warm, dry, intact  Respiratory: breathing WNL, mild SOB  Abdominal: gravid, non-tender, fundus measuring.  Pelvic: deferred.  Extremities: WNL  Psych: mood good, behavior WNL      OB Ultrasound:  Please see \"imaging\" tab under chart review for today's ultrasound results.      Assessment/Plan:  44 year old  at 32w2d here for follow up OB visit.      Pregnancy complicated by:      Maternal:   - cHTN  - hx of fatty liver disease  - hx of myomectomies   - AMA     Fetal:   - tri-tri triplet gestation  - FGR of fetus 3     cHTN  - on labetalol 100 mg TID   - baseline HELLP labs show mild elevation in ALT, AST but not twice normal levels   - reviewed BP goal <140/90; if above goal, then will reevaluate for superimposed preeclampsia before increasing PO antihypertensive dose  - reviewed symptomatic precautions for superimposed preeclampsia       History of fatty liver " disease  - will continue to observe       Surveillance  - weekly UAR and NST for FGR of fetus 3  - q3week growth ultrasounds (last today)     Delivery planning  - Delivery is planned at 34 weeks by CS,  at 08:30AM  -  Feeding: Patient plans for breastfeeding and supplementing with formula.  - Contraception: reviewed all methods. Patient has used pills and Nexplanon in the past. Is not planning any future pregnancies so salpingectomy was discussed. Federal papers were signed today, but she is unsure if she desires this. She will continue to think about options.       Routine PNC  - Prenatal labs 7/10:  Rh pos (O pos), antibody negative  Hgb 9.7 MCV 91 Plt 256K               HepBAg/HIV/RPR/HepCAb: nonreactive               Rubella: immune                 UC: <10,000 CFU/mL Mixture of Urogenital Anabel                AST 65 ALT 74 Cr 0.56 UPC 0.23  - Immunizations:current   - Aneuploidy screening: declines (s/p GC consult )  - Hgb 10.4, RPR NR ()  - 1 hour GCT 131H (); 3 hr GTT 86/141/151/119 nl ()                          RTC in one week    30 minutes spent on the date of the encounter, doing chart review, history and exam, documentation and further activities as noted.      Tova Marshall CNM Student on 2024 at 9:37 AM

## 2024-08-26 NOTE — PROGRESS NOTES
"JEANNE met with Misbah and her partner as part of a NICU MFM consultation.  Dr. Aiyana Cisneros and Dr. Ariana Collins also present from neonatology.  Misbah is currently pregnant with tri/tri triplets with a scheduled  on 24.  Misbah and her partner shared the triplets are two boys and one girl.    JEANNE introduced the role and scope of practice for SW during a NICU stay.  Misbah shares that her main support system lives in the [place], so it may just be her and he partner caring for the triplets once discharged from the NICU.  She indicates her sisters and brothers may plan to visit once the babies are discharged.  SW encouraged parents to access their support system and provided validation of emotions related to stress surrounding the arrival of their triplets.  SW provided business card and encouraged parents to reach out with questions or concerns prior to delivery.    SW will follow patient through NICU admission.    Kalley Thurner, FARSHAD, LGSW  Maternal and Child Health   Reachable via [employer] messenger & call  [email or web address]    After hours social work can be reached via [employer] @ \"Peds SW After Hours On Call 1620 to 08\"  Weekend on-site social work can be reached via [employer] @ \"Peds SW Weekend Onsite 08 to 1630\"      "

## 2024-08-26 NOTE — NURSING NOTE
Writer spoke with Misbah regarding U/S/NST. BPP 8/8 x 3 and Nst was reassuring, but not reactive due to difficult tracing due to increased FM. Pt unable to tolerate additional monitor time after 30 minutes. Pt babies were very difficult to trace on the monitor.

## 2024-08-26 NOTE — NURSING NOTE
Dr. Cisneros and Leona NICU/SW met with pt to do NICU consult to discuss early delivery at 34 weeks via C/S

## 2024-08-26 NOTE — NURSING NOTE
Murali seen in clinic today for F/U OB visit/BPP/NST at 32w2d gestation. VSS. Pt reports + fetal movement, see flowsheet. Pt evaluated for  labor, preeclampsia, vaginal bleeding, infection, fetal wellbeing without concerns. Pt denies bleeding/lof/change in vaginal discharge/contractions/headache/vision changes/chest pain/SOB/edema. Pt notified to review new pt education in AVS, verbally reviewed highlights.  ARI Estrella met with pt and discussed POC. Plan for pt to have weekly BPP/NST and weekly OBV. Pt met with NICU Dr. Cisneros for a consult today as well. Pt scheduled for weekly OBV/ Bpp/NST for next 2 weeks. Pt was scheduled for 1st Beta on  8:00 am at [facility] and they were called and 2nd Beta will be on  at 8:00 am at [facility].  Pt sighed BTL paperwork today and this was sent down to be scanned. Future visits scheduled at . Pt discharged stable and ambulatory.

## 2024-08-26 NOTE — PROGRESS NOTES
The patient was seen for an ultrasound in the [facility] today.  For a detailed report of the ultrasound examination, please see the ultrasound report which can be found under the imaging tab.     If you have questions regarding today's evaluation or if we can be of further service, please contact the M[facility]Fetal Medicine Center.    Elva Quinones MD  Maternal Fetal Medicine

## 2024-08-29 ENCOUNTER — DOCUMENTATION ONLY (OUTPATIENT)
Dept: CARE COORDINATION | Facility: CLINIC | Age: 44
End: 2024-08-29
Payer: MEDICAID

## 2024-09-04 ENCOUNTER — OFFICE VISIT (OUTPATIENT)
Dept: MATERNAL FETAL MEDICINE | Facility: CLINIC | Age: 44
End: 2024-09-04
Attending: ADVANCED PRACTICE MIDWIFE
Payer: MEDICAID

## 2024-09-04 ENCOUNTER — HOSPITAL ENCOUNTER (OUTPATIENT)
Dept: ULTRASOUND IMAGING | Facility: CLINIC | Age: 44
Discharge: HOME | End: 2024-09-04
Attending: OBSTETRICS & GYNECOLOGY
Payer: MEDICAID

## 2024-09-04 ENCOUNTER — TELEPHONE (OUTPATIENT)
Dept: MATERNAL FETAL MEDICINE | Facility: CLINIC | Age: 44
End: 2024-09-04

## 2024-09-04 ENCOUNTER — OFFICE VISIT (OUTPATIENT)
Dept: MATERNAL FETAL MEDICINE | Facility: CLINIC | Age: 44
End: 2024-09-04
Attending: OBSTETRICS & GYNECOLOGY
Payer: MEDICAID

## 2024-09-04 ENCOUNTER — LAB (OUTPATIENT)
Dept: LAB | Facility: CLINIC | Age: 44
End: 2024-09-04
Attending: OBSTETRICS & GYNECOLOGY
Payer: MEDICAID

## 2024-09-04 VITALS
SYSTOLIC BLOOD PRESSURE: 147 MMHG | HEART RATE: 86 BPM | OXYGEN SATURATION: 98 % | RESPIRATION RATE: 20 BRPM | DIASTOLIC BLOOD PRESSURE: 85 MMHG

## 2024-09-04 DIAGNOSIS — O36.5933: ICD-10-CM

## 2024-09-04 DIAGNOSIS — O30.133 TRICHORIONIC TRIAMNIOTIC TRIPLET PREGNANCY IN THIRD TRIMESTER: ICD-10-CM

## 2024-09-04 DIAGNOSIS — O09.93 SUPERVISION OF HIGH RISK PREGNANCY IN THIRD TRIMESTER: Primary | ICD-10-CM

## 2024-09-04 DIAGNOSIS — O10.919 CHRONIC HYPERTENSION AFFECTING PREGNANCY: ICD-10-CM

## 2024-09-04 LAB
ALBUMIN MFR UR ELPH: 17.4 MG/DL
ALT SERPL W P-5'-P-CCNC: 11 U/L (ref 0–50)
AST SERPL W P-5'-P-CCNC: 26 U/L (ref 0–45)
CREAT SERPL-MCNC: 0.68 MG/DL (ref 0.51–0.95)
CREAT UR-MCNC: 102.5 MG/DL
EGFRCR SERPLBLD CKD-EPI 2021: >90 ML/MIN/1.73M2
ERYTHROCYTE [DISTWIDTH] IN BLOOD BY AUTOMATED COUNT: 15.2 % (ref 10–15)
HCT VFR BLD AUTO: 34.8 % (ref 35–47)
HGB BLD-MCNC: 11.3 G/DL (ref 11.7–15.7)
MCH RBC QN AUTO: 31 PG (ref 26.5–33)
MCHC RBC AUTO-ENTMCNC: 32.5 G/DL (ref 31.5–36.5)
MCV RBC AUTO: 96 FL (ref 78–100)
PLATELET # BLD AUTO: 190 10E3/UL (ref 150–450)
PROT/CREAT 24H UR: 0.17 MG/MG CR (ref 0–0.2)
RBC # BLD AUTO: 3.64 10E6/UL (ref 3.8–5.2)
WBC # BLD AUTO: 7.7 10E3/UL (ref 4–11)

## 2024-09-04 PROCEDURE — 84450 TRANSFERASE (AST) (SGOT): CPT

## 2024-09-04 PROCEDURE — G0463 HOSPITAL OUTPT CLINIC VISIT: HCPCS | Mod: 25 | Performed by: ADVANCED PRACTICE MIDWIFE

## 2024-09-04 PROCEDURE — 84460 ALANINE AMINO (ALT) (SGPT): CPT

## 2024-09-04 PROCEDURE — 76818 FETAL BIOPHYS PROFILE W/NST: CPT | Mod: 26 | Performed by: OBSTETRICS & GYNECOLOGY

## 2024-09-04 PROCEDURE — 76818 FETAL BIOPHYS PROFILE W/NST: CPT | Mod: 59

## 2024-09-04 PROCEDURE — 99214 OFFICE O/P EST MOD 30 MIN: CPT | Mod: 25

## 2024-09-04 PROCEDURE — 82565 ASSAY OF CREATININE: CPT

## 2024-09-04 PROCEDURE — 36415 COLL VENOUS BLD VENIPUNCTURE: CPT

## 2024-09-04 PROCEDURE — 76820 UMBILICAL ARTERY ECHO: CPT | Mod: 59

## 2024-09-04 PROCEDURE — 85027 COMPLETE CBC AUTOMATED: CPT

## 2024-09-04 PROCEDURE — 76820 UMBILICAL ARTERY ECHO: CPT | Mod: 26 | Performed by: OBSTETRICS & GYNECOLOGY

## 2024-09-04 PROCEDURE — 84156 ASSAY OF PROTEIN URINE: CPT

## 2024-09-04 RX ORDER — LABETALOL 100 MG/1
100 TABLET, FILM COATED ORAL ONCE
Qty: 1 TABLET | Refills: 0 | Status: SHIPPED | OUTPATIENT
Start: 2024-09-04 | End: 2024-09-04

## 2024-09-04 ASSESSMENT — PAIN SCALES - GENERAL: PAINLEVEL: NO PAIN (0)

## 2024-09-04 NOTE — PROGRESS NOTES
"Maternal fetal Medicine OB Follow up visit.     Murali Norwood  : 1980  MRN: [id number]    CC: OB Follow-up    Subjective:  Murali Norwood is a 44 year old  at 33w4d presenting for routine OB follow-up. Today, she is here with her  and overall feeling well. She has noticed seeing sparkles occasionally with movement or in the shower. Denies headache, other vision changes, or RUQ pain. She has not taken her morning labetalol dose yet as she was feeling mildly nauseated.     Patient denies regular, painful contractions, denies loss of fluid or vaginal bleeding. Reports fetal movement x3.      OB Hx:  OB History    Para Term  AB Living   3 2 2 0 0 2   SAB IAB Ectopic Multiple Live Births   0 0 0 0 2      # Outcome Date GA Lbr Lauro/2nd Weight Sex Type Anes PTL Lv   3 Current            2 Term            1 Term                  Objective:  Initial BPs: 150/101, 159/99, 155/100  BP 30 min after dose of labetalol: 147/85  Gen: alert, oriented, NAD  Skin: warm, dry, intact  Respiratory: breathing unlabored, no SOB  Abdominal: gravid, non-tender  Pelvic: deferred  Extremities: WNL  Psych: mood WNL, behavior WNL      OB Ultrasound:  Please see \"imaging\" tab under chart review for today's ultrasound results.      Assessment/Plan:  44 year old  at 33w4d here for follow up OB visit.    Pregnancy has been complicated by:   Maternal dx:   - cHTN  - hx of fatty liver disease  - hx of myomectomies   - AMA     Fetal dx:  - tri-tri triplet gestation  - FGR of fetus 3     cHTN  - on labetalol 100 mg TID. Initial BP high mild-range (150s/100s). 100mg of labetalol able to be obtained from pharmacy and given. BP recheck 30 min later was improved, but still mild-range (140s/80s). Plan to collect updated HELLP labs today and patient will call the clinic at 2:30-3pm with mid-day BP. If still mild-range, will increase labetalol to 200mg TID. Labs will hopefully have retunred by that " time as well. If abnormal, will instruct patient to present to L&D for further evaluation.   - baseline HELLP labs show mild elevation in ALT, AST but not twice normal levels   - reviewed BP goal <140/90; if above goal, then will reevaluate for superimposed preeclampsia before increasing PO antihypertensive dose  - reviewed symptomatic precautions for superimposed preeclampsia       History of fatty liver disease  - will continue to observe       Surveillance  - weekly UAR and NST for FGR of fetus 3. NST non-reactive x 3 today, but  BPPs x3.  - q3week growth ultrasounds      Delivery planning  - Delivery is planned at 34 weeks by CS. Scheduled for .  - Feeding: breast and formula  - Contraception plans: desires sterilization. FTP signed on .     Routine PNC  - Prenatal labs 7/10:  Rh pos (O pos), antibody negative  Hgb 9.7 MCV 91 Plt 256K               HepBAg/HIV/RPR/HepCAb: nonreactive               Rubella: immune                 UC: <10,000 CFU/mL Mixture of Urogenital Anabel                AST 65 ALT 74 Cr 0.56 UPC 0.23  - Immunizations: no records, but patient reports receiving this with her care while in [place].  - Aneuploidy screening: declines (s/p GC consult )  - Hgb 10.4 (continue with PNV x2 and PO iron), RPR NR ()  - 1 hour GCT 131H (); 3 hr GTT 86/141/151/119 nl ()    35 minutes spent on the date of the encounter, doing chart review, history and exam, documentation and further activities as noted.      Diana Estrella CNM on 2024 at 8:43 AM

## 2024-09-04 NOTE — TELEPHONE ENCOUNTER
Writer called Misbah to see if she has checked her BP yet. Pt stated she was sleeping, but will check her BP now.  BP was 140/70. Per Amira, pt to increase Labetalol to 200mg TID. Manisha Jaime RN

## 2024-09-04 NOTE — PROGRESS NOTES
Please see full imaging report from [employer] program under imaging tab.    Loly Clark MD  Maternal Fetal Medicine

## 2024-09-04 NOTE — PROGRESS NOTES
NICU Consult    I had the pleasure of meeting with Ms. Alexander Norwood (Bola) and her partner in the [facility] clinic at the [facility] for  consultation at the request of Dr. Coon.  Ms. Norwood is currently 32 weeks pregnant with tri-tri triplets, baby A and B are male, baby C is female.  Baby C has had growth restriction.  Intended names are Chrissie, Tamie, and Trent.  Delivery is planned at 34 weeks due to triplet pregnancy with growth restriction.      We initially discussed the NICU resuscitation teams that will be present for each baby.  I described the various respiratory interventions that may be needed at 34 weeks gestation.  We also discussed the other potential issues of prematurity including IV nutrition, gavage/po feeding progression, respiratory immaturity/apnea, and impaired thermoregulation.  I described the various lengths of stay at this gestation and the criteria for discharge from the NICU.      We also reviewed the basic layout of the NICU and the makeup of the care team, inviting the family to be present for rounds daily as desired.  We reviewed the support provided by maternal child health social work, OT, and lactation. We discussed the visiting guidelines.  At the completion of the visit, all questions were answered.  Please feel free to reach out with any additional questions prior to delivery.     Aiyana Cisneros MD  Neonatology    Total time of visit: 35 mins with 100% of time in direct patient counseling.

## 2024-09-04 NOTE — PROGRESS NOTES
Social Work Initial Consult    DATA/ASSESSMENT    General Information  JEANNE met Misbah in the [facility] clinic as part of a NICU consult earlier this week.  JEANNE completed a follow-up phone call with Misbah this afternoon.  She is currently 32w5d pregnant with tri/tri triplets with a scheduled  on 2024.    Living Environment:   Misbah and her  currently reside in [place], [place].  Misbah indicates she typically  resides in the [place] ([place] she is from), and visits her  in [place] each summer.  She expressed that due to traveling restraints while pregnant, she is not able to return to the [place] for delivery.  She is in [place] on an ESTA visa.    Assessment of Support  Misbah shares concern about lack of support in the [place] and endorses feelings of overwhelm.  She indicated that her siblings in the [place] may be able to visit once the triplets are discharged, but indicates it is not guaranteed pending their work schedules.  Misbah states that her  has more friends in [place] that may be able to help.    Misbah shares that she doesn't have many supplies prepared for her babies, and is interested in JEANNE sending her resources.  JEANNE emailed Misbah resources for [employer], [employer], [employer], and [facility].    Financial  Misbah inquired about WIC and SNAP benefits, indicating she spoke with someone at the clinic about it.  JEANNE shared information about WIC and the [facility] phone number.  JEANNE and Misbah discussed potential eligibility concerns for SNAP due to Misbah not being a [place] citizen.      Misbah shared that she received paperwork from [facility] [employer] financial counseling that she states is confusing.  She texted JEANNE pictures of the paperwork, and it appeared to be an application for [facility] [employer].  JEANNE sent an inbox message to the assigned [facility] financial counselor inquiring about next steps for  "Misbah.    Coping/Stress  Misbah endorses feelings of stress and overwhelm in anticipation of the triplets.  She is weary about what support will look like.  Misbah is seeing a therapist through the [facility] that she finds helpful, and looks forward to continuing these sessions postpartum.     INTERVENTION    Conducted chart review and consulted with medical team regarding plan of care. Introduced SW role and scope of practice.   Provided assessment of patient and family's level of coping  Validated emotions and provided supportive listening  Facilitated service linkage with hospital and community resources  Contacted [place] financial counseling  Provided SW contact info    PLAN    SW will continue to follow for supportive intervention.     Kalley Thurner, MSW, LGSW  Maternal and Child Health   Reachable via [employer] messenger & call  [email or web address]    After hours social work can be reached via [employer] @ \"Peds SW After Hours On Call 1620 to 08\"  Weekend on-site social work can be reached via [employer] @ \"Peds SW Weekend Onsite 08 to 1630\"          "

## 2024-09-04 NOTE — NURSING NOTE
Murali seen in clinic today for F/U OB visit at 33w4d gestation. VSS. Pt reports + fetal movement, see flowsheet. Pt evaluated for  labor, preeclampsia, vaginal bleeding, infection, fetal wellbeing  without concerns. Pt denies bleeding/lof/change in vaginal discharge/contractions/headache/vision changes/chest pain/SOB/edema. Pt notified to review new pt education in AVS, verbally reviewed highlights. ARI Estrella met with pt and discussed POC. Plan for pt to get Labetalol at the pharmacy now as pt didn't take her meds this morning. Pt had recheck of BP and was 140's/80's so pt to lab for pre-e lab eval. Future visits scheduled at .[facility] scheduled [facility] and [facility]  at [facility] with pre op labs. . Pt discharged stable and ambulatory.

## 2024-09-08 ENCOUNTER — HOSPITAL ENCOUNTER (OUTPATIENT)
Facility: CLINIC | Age: 44
Discharge: HOME | End: 2024-09-08
Attending: OBSTETRICS & GYNECOLOGY | Admitting: OBSTETRICS & GYNECOLOGY
Payer: MEDICAID

## 2024-09-08 LAB
ABO/RH(D): NORMAL
ANTIBODY SCREEN: NEGATIVE
SPECIMEN EXPIRATION DATE: NORMAL

## 2024-09-08 PROCEDURE — 250N000011 HC RX IP 250 OP 636

## 2024-09-08 PROCEDURE — 96372 THER/PROPH/DIAG INJ SC/IM: CPT

## 2024-09-08 RX ORDER — BETAMETHASONE SODIUM PHOSPHATE AND BETAMETHASONE ACETATE 3; 3 MG/ML; MG/ML
12 INJECTION, SUSPENSION INTRA-ARTICULAR; INTRALESIONAL; INTRAMUSCULAR; SOFT TISSUE ONCE
Status: COMPLETED | OUTPATIENT
Start: 2024-09-08 | End: 2024-09-08

## 2024-09-08 RX ADMIN — BETAMETHASONE SODIUM PHOSPHATE AND BETAMETHASONE ACETATE 12 MG: 3; 3 INJECTION, SUSPENSION INTRA-ARTICULAR; INTRALESIONAL; INTRAMUSCULAR at 10:01

## 2024-09-08 ASSESSMENT — ACTIVITIES OF DAILY LIVING (ADL)
ADLS_ACUITY_SCORE: 33
ADLS_ACUITY_SCORE: 33

## 2024-09-09 ENCOUNTER — HOSPITAL ENCOUNTER (OUTPATIENT)
Dept: ULTRASOUND IMAGING | Facility: CLINIC | Age: 44
Discharge: HOME | End: 2024-09-09
Attending: OBSTETRICS & GYNECOLOGY
Payer: MEDICAID

## 2024-09-09 ENCOUNTER — LAB (OUTPATIENT)
Dept: LAB | Facility: CLINIC | Age: 44
End: 2024-09-09
Attending: OBSTETRICS & GYNECOLOGY
Payer: MEDICAID

## 2024-09-09 ENCOUNTER — OFFICE VISIT (OUTPATIENT)
Dept: MATERNAL FETAL MEDICINE | Facility: CLINIC | Age: 44
End: 2024-09-09
Attending: ADVANCED PRACTICE MIDWIFE
Payer: MEDICAID

## 2024-09-09 ENCOUNTER — OFFICE VISIT (OUTPATIENT)
Dept: MATERNAL FETAL MEDICINE | Facility: CLINIC | Age: 44
End: 2024-09-09
Attending: OBSTETRICS & GYNECOLOGY
Payer: MEDICAID

## 2024-09-09 VITALS
DIASTOLIC BLOOD PRESSURE: 76 MMHG | OXYGEN SATURATION: 97 % | SYSTOLIC BLOOD PRESSURE: 113 MMHG | RESPIRATION RATE: 20 BRPM | HEART RATE: 80 BPM

## 2024-09-09 DIAGNOSIS — O36.5933: ICD-10-CM

## 2024-09-09 DIAGNOSIS — O30.133 TRICHORIONIC TRIAMNIOTIC TRIPLET PREGNANCY IN THIRD TRIMESTER: ICD-10-CM

## 2024-09-09 DIAGNOSIS — Z01.818 PRE-OP TESTING: ICD-10-CM

## 2024-09-09 DIAGNOSIS — O09.93 SUPERVISION OF HIGH RISK PREGNANCY IN THIRD TRIMESTER: Primary | ICD-10-CM

## 2024-09-09 LAB
ERYTHROCYTE [DISTWIDTH] IN BLOOD BY AUTOMATED COUNT: 14.9 % (ref 10–15)
HCT VFR BLD AUTO: 36.9 % (ref 35–47)
HGB BLD-MCNC: 11.9 G/DL (ref 11.7–15.7)
HOLD SPECIMEN: NORMAL
MCH RBC QN AUTO: 30.7 PG (ref 26.5–33)
MCHC RBC AUTO-ENTMCNC: 32.2 G/DL (ref 31.5–36.5)
MCV RBC AUTO: 95 FL (ref 78–100)
PLATELET # BLD AUTO: 202 10E3/UL (ref 150–450)
RBC # BLD AUTO: 3.87 10E6/UL (ref 3.8–5.2)
T PALLIDUM AB SER QL: NONREACTIVE
WBC # BLD AUTO: 9 10E3/UL (ref 4–11)

## 2024-09-09 PROCEDURE — 86900 BLOOD TYPING SEROLOGIC ABO: CPT

## 2024-09-09 PROCEDURE — 96372 THER/PROPH/DIAG INJ SC/IM: CPT | Performed by: ADVANCED PRACTICE MIDWIFE

## 2024-09-09 PROCEDURE — G0463 HOSPITAL OUTPT CLINIC VISIT: HCPCS | Mod: 25 | Performed by: ADVANCED PRACTICE MIDWIFE

## 2024-09-09 PROCEDURE — 76820 UMBILICAL ARTERY ECHO: CPT | Mod: 59

## 2024-09-09 PROCEDURE — 36415 COLL VENOUS BLD VENIPUNCTURE: CPT

## 2024-09-09 PROCEDURE — 86780 TREPONEMA PALLIDUM: CPT

## 2024-09-09 PROCEDURE — 76820 UMBILICAL ARTERY ECHO: CPT | Mod: 26 | Performed by: OBSTETRICS & GYNECOLOGY

## 2024-09-09 PROCEDURE — 99214 OFFICE O/P EST MOD 30 MIN: CPT | Mod: 25 | Performed by: ADVANCED PRACTICE MIDWIFE

## 2024-09-09 PROCEDURE — 85027 COMPLETE CBC AUTOMATED: CPT

## 2024-09-09 PROCEDURE — 59025 FETAL NON-STRESS TEST: CPT | Mod: 26 | Performed by: OBSTETRICS & GYNECOLOGY

## 2024-09-09 PROCEDURE — 250N000011 HC RX IP 250 OP 636: Performed by: ADVANCED PRACTICE MIDWIFE

## 2024-09-09 RX ORDER — BETAMETHASONE SOD PHOSPH-WATER 6 MG/ML
12 VIAL (ML) INJECTION ONCE
Status: SHIPPED
Start: 2024-09-09 | End: 2024-09-09

## 2024-09-09 RX ORDER — BETAMETHASONE SODIUM PHOSPHATE AND BETAMETHASONE ACETATE 3; 3 MG/ML; MG/ML
12 INJECTION, SUSPENSION INTRA-ARTICULAR; INTRALESIONAL; INTRAMUSCULAR; SOFT TISSUE ONCE
Status: COMPLETED | OUTPATIENT
Start: 2024-09-09 | End: 2024-09-09

## 2024-09-09 RX ADMIN — BETAMETHASONE SODIUM PHOSPHATE AND BETAMETHASONE ACETATE 12 MG: 3; 3 INJECTION, SUSPENSION INTRA-ARTICULAR; INTRALESIONAL; INTRAMUSCULAR at 09:30

## 2024-09-09 ASSESSMENT — PAIN SCALES - GENERAL: PAINLEVEL: NO PAIN (0)

## 2024-09-09 NOTE — PROGRESS NOTES
"Maternal fetal Medicine OB Follow up visit.     Murali Norwood  : 1980  MRN: [id number]    CC: OB Follow-up    Subjective:  Murali Norwood is a 44 year old  at 34w2d presenting for routine OB follow-up. Today, she is here with her , and feeling ready for her scheduled c/s on Wednesday.     Patient denies regular, painful contractions, denies loss of fluid or vaginal bleeding.  Reports fetal movement x3.    Has increased her labetalol to 200mg TID and reports home BP readings at goal.    Patient also denies any recent fevers/chills, headaches or changes in vision, RUQ pain, nausea or vomiting, constipation, diarrhea or other systemic symptoms.      OB Hx:  OB History    Para Term  AB Living   3 2 2 0 0 2   SAB IAB Ectopic Multiple Live Births   0 0 0 0 2      # Outcome Date GA Lbr Lauro/2nd Weight Sex Type Anes PTL Lv   3 Current            2 Term            1 Term                  Objective:  /76 (BP Location: Left arm, Patient Position: Sitting, Cuff Size: Adult Large)   Pulse 80   Resp 20   SpO2 97%     Gen: alert, oriented, NAD  Skin: warm, dry, intact  Respiratory: breathing unlabored, no SOB  Abdominal: gravid, non-tender  Pelvic: deferred  Extremities: WNL  Psych: mood WNL, behavior WNL      OB Ultrasound:  Please see \"imaging\" tab under chart review for today's ultrasound results.      Assessment/Plan:  44 year old  at 34w2d here for follow up OB visit.    Pregnancy has been complicated by:   Maternal dx:   - cHTN  - hx of fatty liver disease  - hx of myomectomies   - AMA     Fetal dx:  - tri-tri triplet gestation  - FGR of fetus 3     cHTN  - Continue on labetalol 200 mg TID.   - reviewed BP goal <140/90; if above goal, then will reevaluate for superimposed preeclampsia before increasing PO antihypertensive dose. Last checked with elevated BP on  and WNL  - reviewed symptomatic precautions for superimposed preeclampsia    - Discussed " enrollment in [place]-BP program after delivery.     Suspicion for fatty liver disease  - baseline HELLP labs show mild elevation in ALT, AST but not twice normal levels c/w NAFLD, however WNL on  with updated preE labs.    Tri/tri triplets  FGR fetus C   Surveillance  - weekly UAR and NST for FGR of fetus 3. NST x 3 today.  - q3week growth ultrasounds      Delivery planning  - Delivery is planned at 34 weeks by CS. Scheduled for . Pre-op labs collected today.   - BMZ x2 on  and .  - Feeding: breast and formula  - Contraception plans: desires sterilization. FTP signed on .     Routine PNC  - Prenatal labs 7/10:  Rh pos (O pos), antibody negative  Hgb 9.7 MCV 91 Plt 256K               HepBAg/HIV/RPR/HepCAb: nonreactive               Rubella: immune                 UC: <10,000 CFU/mL Mixture of Urogenital Anabel                AST 65 ALT 74 Cr 0.56 UPC 0.23  - Immunizations: no records, but patient reports receiving Tdap with her care while in [place].  - Aneuploidy screening: declines (s/p GC consult )  - Hgb 10.4 (continue with PNV x2 and PO iron), RPR NR ()  - 1 hour GCT 131H (); 3 hr GTT 86/141/151/119 nl ()  - Postpartum follow up at 1-2 weeks and 6 weeks.    30 minutes spent on the date of the encounter, doing chart review, history and exam, documentation and further activities as noted.      Diana Estrella CNM on 2024 at 11:36 AM

## 2024-09-09 NOTE — PROGRESS NOTES
Please see the imaging tab for details of the ultrasound performed today.    Cintia Clarke MD  Specialist in Maternal-Fetal Medicine

## 2024-09-09 NOTE — PROGRESS NOTES
Murali seen in clinic today for F/U OB visit at 34w2d gestation.Pt received 2nd Betamethasone injection today. [facility]. VSS. Pt reports + fetal movement, see flowsheet. Pt evaluated for  labor, preeclampsia, vaginal bleeding, infection, fetal wellbeing  without concerns. Pt denies bleeding/lof/change in vaginal discharge/contractions/headache/vision changes/chest pain/SOB/edema. Pt notified to review new pt education in AVS, verbally reviewed highlights. ARI Estrella  met with pt and discussed POC. Plan for pt to have pre op labs today. Pt reports BP at home was 138/78 this morning before meds. Pt was given all instructions and map regarding C/S tomorrow. Pt left amb and stable. Future visits scheduled at . Pt discharged stable and ambulatory.  Pt had numerous questions and concerns answered. Pt to lab for preop labs.

## 2024-09-10 ENCOUNTER — ANESTHESIA EVENT (OUTPATIENT)
Dept: OBGYN | Facility: CLINIC | Age: 44
End: 2024-09-10
Payer: MEDICAID

## 2024-09-10 NOTE — ANESTHESIA PREPROCEDURE EVALUATION
"Anesthesia Pre-Procedure Evaluation    Patient: Murali Norwood   MRN: [id number] : 1980        Procedure : Procedure(s):   SECTION          Past Medical History:   Diagnosis Date    Anemia     Hypertension       Past Surgical History:   Procedure Laterality Date    INTERVENTIONAL RADIOLOGY ADULT/PEDS IP CONSULT        No Known Allergies   Social History     Tobacco Use    Smoking status: Never    Smokeless tobacco: Never   Substance Use Topics    Alcohol use: Not Currently      Wt Readings from Last 1 Encounters:   24 95.3 kg (210 lb)        Anesthesia Evaluation            ROS/MED HX  ENT/Pulmonary:  - neg pulmonary ROS     Neurologic:  - neg neurologic ROS     Cardiovascular: Comment: cHTN currently on Labetalol 200 mg TID       METS/Exercise Tolerance:     Hematologic:     (+)      anemia (hx in pregnancy),          Musculoskeletal:       GI/Hepatic:       Renal/Genitourinary: Comment: Hx of elevate liver enzymes this pregnancy, hx of NAFLD. - neg Renal ROS     Endo:  - neg endo ROS     Psychiatric/Substance Use:  - neg psychiatric ROS     Infectious Disease:  - neg infectious disease ROS     Malignancy:  - neg malignancy ROS     Other: Comment:   -Spontaneous, Trichorionic Triamniotic Triplets.   -FGR in baby \"c\"  -Scant prenatal care  -hx of Myomectomy x2  -[place] has been back and [place] from [place], Doctors with [facility] in the .  - neg other ROS   (-) placenta previa       Physical Exam    Airway        Mallampati: II   TM distance: > 3 FB   Neck ROM: full   Mouth opening: > 3 cm    Respiratory Devices and Support         Dental  no notable dental history         Cardiovascular   cardiovascular exam normal          Pulmonary   pulmonary exam normal              CBC RESULTS:   Recent Labs   Lab Test 24  1045 24  1046 24  1105 07/10/24  1436   WBC 9.0 7.7 7.0 8.9   HGB 11.9 11.3* 10.4* 9.7*   HCT 36.9 34.8* 32.8* 30.5*    " "190 246 256     Last Comprehensive Metabolic Panel:  Recent Labs   Lab Test 09/04/24  1046 07/10/24  1436   NA  --  139   POTASSIUM  --  3.7   CHLORIDE  --  106   CO2  --  23   ANIONGAP  --  10   BUN  --  4.8*   CR 0.68 0.56   GFRESTIMATED >90 >90   GLC  --  89   JOSELYN  --  8.8      Liver Function Studies -   Recent Labs   Lab Test 09/04/24  1046 07/10/24  1436   PROTTOTAL  --  6.0*   ALBUMIN  --  3.5   BILITOTAL  --  2.2*   ALKPHOS  --  104   AST 26 65*   ALT 11 74*       OUTSIDE LABS:  CBC:   Lab Results   Component Value Date    WBC 9.0 09/09/2024    WBC 7.7 09/04/2024    HGB 11.9 09/09/2024    HGB 11.3 (L) 09/04/2024    HCT 36.9 09/09/2024    HCT 34.8 (L) 09/04/2024     09/09/2024     09/04/2024     BMP:   Lab Results   Component Value Date     07/10/2024    POTASSIUM 3.7 07/10/2024    CHLORIDE 106 07/10/2024    CO2 23 07/10/2024    BUN 4.8 (L) 07/10/2024    CR 0.68 09/04/2024    CR 0.56 07/10/2024    GLC 89 07/10/2024     COAGS: No results found for: \"PTT\", \"INR\", \"FIBR\"  POC: No results found for: \"BGM\", \"HCG\", \"HCGS\"  HEPATIC:   Lab Results   Component Value Date    ALBUMIN 3.5 07/10/2024    PROTTOTAL 6.0 (L) 07/10/2024    ALT 11 09/04/2024    AST 26 09/04/2024    ALKPHOS 104 07/10/2024    BILITOTAL 2.2 (H) 07/10/2024     OTHER:   Lab Results   Component Value Date    JOSELYN 8.8 07/10/2024       Anesthesia Plan    ASA Status:  3       Anesthesia Type: Spinal.   Induction: N/a.   Maintenance: N/A.        Consents            Postoperative Care    Pain management: Neuraxial analgesia, Oral pain medications, Peripheral nerve block (Single Shot).   PONV prophylaxis: Ondansetron (or other 5HT-3)     Comments:    Other Comments: Ken Ritchie MD    I have reviewed the pertinent notes and labs in the chart from the past 30 days and (re)examined the patient.  Any updates or changes from those notes are reflected in this note.                  "

## 2024-09-11 ENCOUNTER — HOSPITAL ENCOUNTER (INPATIENT)
Facility: CLINIC | Age: 44
LOS: 2 days | Discharge: HOME | End: 2024-09-13
Attending: OBSTETRICS & GYNECOLOGY | Admitting: OBSTETRICS & GYNECOLOGY
Payer: MEDICAID

## 2024-09-11 ENCOUNTER — ANESTHESIA (OUTPATIENT)
Dept: OBGYN | Facility: CLINIC | Age: 44
End: 2024-09-11
Payer: MEDICAID

## 2024-09-11 DIAGNOSIS — D62 ANEMIA DUE TO BLOOD LOSS, ACUTE: ICD-10-CM

## 2024-09-11 DIAGNOSIS — Z98.891 S/P CESAREAN SECTION: Primary | ICD-10-CM

## 2024-09-11 DIAGNOSIS — O30.139 TRICHORIONIC TRIAMNIOTIC TRIPLET PREGNANCY, ANTEPARTUM: ICD-10-CM

## 2024-09-11 DIAGNOSIS — O09.523 MULTIGRAVIDA OF ADVANCED MATERNAL AGE IN THIRD TRIMESTER: ICD-10-CM

## 2024-09-11 DIAGNOSIS — O10.919 CHRONIC HYPERTENSION AFFECTING PREGNANCY: ICD-10-CM

## 2024-09-11 DIAGNOSIS — I10 CHRONIC HYPERTENSION: ICD-10-CM

## 2024-09-11 LAB
ALBUMIN SERPL BCG-MCNC: 2.5 G/DL (ref 3.5–5.2)
ALBUMIN SERPL BCG-MCNC: 2.7 G/DL (ref 3.5–5.2)
ALP SERPL-CCNC: 142 U/L (ref 40–150)
ALP SERPL-CCNC: 207 U/L (ref 40–150)
ALT SERPL W P-5'-P-CCNC: 18 U/L (ref 0–50)
ALT SERPL W P-5'-P-CCNC: 22 U/L (ref 0–50)
ANION GAP SERPL CALCULATED.3IONS-SCNC: 6 MMOL/L (ref 7–15)
ANION GAP SERPL CALCULATED.3IONS-SCNC: 8 MMOL/L (ref 7–15)
APTT PPP: 29 SECONDS (ref 22–38)
APTT PPP: 33 SECONDS (ref 22–38)
AST SERPL W P-5'-P-CCNC: 48 U/L (ref 0–45)
AST SERPL W P-5'-P-CCNC: 53 U/L (ref 0–45)
BASOPHILS # BLD AUTO: ABNORMAL 10*3/UL
BASOPHILS # BLD MANUAL: 0.1 10E3/UL (ref 0–0.2)
BASOPHILS NFR BLD AUTO: ABNORMAL %
BASOPHILS NFR BLD MANUAL: 1 %
BILIRUB SERPL-MCNC: 1.1 MG/DL
BILIRUB SERPL-MCNC: 1.2 MG/DL
BLD PROD TYP BPU: NORMAL
BLOOD COMPONENT TYPE: NORMAL
BUN SERPL-MCNC: 9 MG/DL (ref 6–20)
BUN SERPL-MCNC: 9.7 MG/DL (ref 6–20)
CALCIUM SERPL-MCNC: 7.8 MG/DL (ref 8.8–10.4)
CALCIUM SERPL-MCNC: 8 MG/DL (ref 8.8–10.4)
CHLORIDE SERPL-SCNC: 108 MMOL/L (ref 98–107)
CHLORIDE SERPL-SCNC: 112 MMOL/L (ref 98–107)
CODING SYSTEM: NORMAL
CREAT SERPL-MCNC: 0.74 MG/DL (ref 0.51–0.95)
CREAT SERPL-MCNC: 0.77 MG/DL (ref 0.51–0.95)
CROSSMATCH: NORMAL
CROSSMATCH: NORMAL
EGFRCR SERPLBLD CKD-EPI 2021: >90 ML/MIN/1.73M2
EGFRCR SERPLBLD CKD-EPI 2021: >90 ML/MIN/1.73M2
EOSINOPHIL # BLD AUTO: ABNORMAL 10*3/UL
EOSINOPHIL # BLD MANUAL: 0 10E3/UL (ref 0–0.7)
EOSINOPHIL NFR BLD AUTO: ABNORMAL %
EOSINOPHIL NFR BLD MANUAL: 0 %
ERYTHROCYTE [DISTWIDTH] IN BLOOD BY AUTOMATED COUNT: 15.1 % (ref 10–15)
ERYTHROCYTE [DISTWIDTH] IN BLOOD BY AUTOMATED COUNT: 15.3 % (ref 10–15)
FIBRINOGEN PPP-MCNC: 136 MG/DL (ref 170–510)
FIBRINOGEN PPP-MCNC: 156 MG/DL (ref 170–510)
FRAGMENTS BLD QL SMEAR: SLIGHT
GLUCOSE SERPL-MCNC: 85 MG/DL (ref 70–99)
GLUCOSE SERPL-MCNC: 92 MG/DL (ref 70–99)
HCO3 SERPL-SCNC: 22 MMOL/L (ref 22–29)
HCO3 SERPL-SCNC: 23 MMOL/L (ref 22–29)
HCT VFR BLD AUTO: 23 % (ref 35–47)
HCT VFR BLD AUTO: 29.2 % (ref 35–47)
HGB BLD-MCNC: 7.4 G/DL (ref 11.7–15.7)
HGB BLD-MCNC: 9.2 G/DL (ref 11.7–15.7)
IMM GRANULOCYTES # BLD: ABNORMAL 10*3/UL
IMM GRANULOCYTES NFR BLD: ABNORMAL %
INR PPP: 1.12 (ref 0.85–1.15)
INR PPP: 1.19 (ref 0.85–1.15)
ISSUE DATE AND TIME: NORMAL
LYMPHOCYTES # BLD AUTO: ABNORMAL 10*3/UL
LYMPHOCYTES # BLD MANUAL: 1.4 10E3/UL (ref 0.8–5.3)
LYMPHOCYTES NFR BLD AUTO: ABNORMAL %
LYMPHOCYTES NFR BLD MANUAL: 13 %
MCH RBC QN AUTO: 30.7 PG (ref 26.5–33)
MCH RBC QN AUTO: 31 PG (ref 26.5–33)
MCHC RBC AUTO-ENTMCNC: 31.5 G/DL (ref 31.5–36.5)
MCHC RBC AUTO-ENTMCNC: 32.2 G/DL (ref 31.5–36.5)
MCV RBC AUTO: 95 FL (ref 78–100)
MCV RBC AUTO: 98 FL (ref 78–100)
METAMYELOCYTES # BLD MANUAL: 0.2 10E3/UL
METAMYELOCYTES NFR BLD MANUAL: 2 %
MONOCYTES # BLD AUTO: ABNORMAL 10*3/UL
MONOCYTES # BLD MANUAL: 0.2 10E3/UL (ref 0–1.3)
MONOCYTES NFR BLD AUTO: ABNORMAL %
MONOCYTES NFR BLD MANUAL: 2 %
MYELOCYTES # BLD MANUAL: 0.1 10E3/UL
MYELOCYTES NFR BLD MANUAL: 1 %
NEUTROPHILS # BLD AUTO: ABNORMAL 10*3/UL
NEUTROPHILS # BLD MANUAL: 9 10E3/UL (ref 1.6–8.3)
NEUTROPHILS NFR BLD AUTO: ABNORMAL %
NEUTROPHILS NFR BLD MANUAL: 81 %
NRBC # BLD AUTO: 0.4 10E3/UL
NRBC # BLD AUTO: 0.8 10E3/UL
NRBC BLD AUTO-RTO: 4 /100
NRBC BLD MANUAL-RTO: 7 %
PLAT MORPH BLD: ABNORMAL
PLATELET # BLD AUTO: 132 10E3/UL (ref 150–450)
PLATELET # BLD AUTO: 157 10E3/UL (ref 150–450)
POLYCHROMASIA BLD QL SMEAR: SLIGHT
POTASSIUM SERPL-SCNC: 4.7 MMOL/L (ref 3.4–5.3)
POTASSIUM SERPL-SCNC: 4.7 MMOL/L (ref 3.4–5.3)
PROT SERPL-MCNC: 4.4 G/DL (ref 6.4–8.3)
PROT SERPL-MCNC: 4.7 G/DL (ref 6.4–8.3)
RBC # BLD AUTO: 2.41 10E6/UL (ref 3.8–5.2)
RBC # BLD AUTO: 2.97 10E6/UL (ref 3.8–5.2)
RBC MORPH BLD: ABNORMAL
SODIUM SERPL-SCNC: 138 MMOL/L (ref 135–145)
SODIUM SERPL-SCNC: 141 MMOL/L (ref 135–145)
UNIT ABO/RH: NORMAL
UNIT NUMBER: NORMAL
UNIT STATUS: NORMAL
UNIT TYPE ISBT: 5100
UNIT TYPE ISBT: 6200
WBC # BLD AUTO: 11.1 10E3/UL (ref 4–11)
WBC # BLD AUTO: 15.9 10E3/UL (ref 4–11)

## 2024-09-11 PROCEDURE — 250N000009 HC RX 250: Performed by: CHIROPRACTOR

## 2024-09-11 PROCEDURE — C9290 INJ, BUPIVACAINE LIPOSOME: HCPCS | Performed by: ANESTHESIOLOGY

## 2024-09-11 PROCEDURE — 0W3R7ZZ CONTROL BLEEDING IN GENITOURINARY TRACT, VIA NATURAL OR ARTIFICIAL OPENING: ICD-10-PCS | Performed by: OBSTETRICS & GYNECOLOGY

## 2024-09-11 PROCEDURE — 84075 ASSAY ALKALINE PHOSPHATASE: CPT

## 2024-09-11 PROCEDURE — 59514 CESAREAN DELIVERY ONLY: CPT | Mod: GC | Performed by: OBSTETRICS & GYNECOLOGY

## 2024-09-11 PROCEDURE — 250N000013 HC RX MED GY IP 250 OP 250 PS 637

## 2024-09-11 PROCEDURE — 85730 THROMBOPLASTIN TIME PARTIAL: CPT

## 2024-09-11 PROCEDURE — 999N000141 HC STATISTIC PRE-PROCEDURE NURSING ASSESSMENT: Performed by: OBSTETRICS & GYNECOLOGY

## 2024-09-11 PROCEDURE — 85384 FIBRINOGEN ACTIVITY: CPT

## 2024-09-11 PROCEDURE — 250N000011 HC RX IP 250 OP 636: Performed by: CHIROPRACTOR

## 2024-09-11 PROCEDURE — 85610 PROTHROMBIN TIME: CPT

## 2024-09-11 PROCEDURE — 85014 HEMATOCRIT: CPT

## 2024-09-11 PROCEDURE — 84155 ASSAY OF PROTEIN SERUM: CPT

## 2024-09-11 PROCEDURE — 120N000002 HC R&B MED SURG/OB UMMC

## 2024-09-11 PROCEDURE — 36415 COLL VENOUS BLD VENIPUNCTURE: CPT

## 2024-09-11 PROCEDURE — 271N000001 HC OR GENERAL SUPPLY NON-STERILE: Performed by: OBSTETRICS & GYNECOLOGY

## 2024-09-11 PROCEDURE — 250N000011 HC RX IP 250 OP 636: Performed by: STUDENT IN AN ORGANIZED HEALTH CARE EDUCATION/TRAINING PROGRAM

## 2024-09-11 PROCEDURE — 84460 ALANINE AMINO (ALT) (SGPT): CPT

## 2024-09-11 PROCEDURE — 710N000010 HC RECOVERY PHASE 1, LEVEL 2, PER MIN: Performed by: OBSTETRICS & GYNECOLOGY

## 2024-09-11 PROCEDURE — 0UT70ZZ RESECTION OF BILATERAL FALLOPIAN TUBES, OPEN APPROACH: ICD-10-PCS | Performed by: OBSTETRICS & GYNECOLOGY

## 2024-09-11 PROCEDURE — 88302 TISSUE EXAM BY PATHOLOGIST: CPT | Mod: TC

## 2024-09-11 PROCEDURE — 250N000011 HC RX IP 250 OP 636: Performed by: ANESTHESIOLOGY

## 2024-09-11 PROCEDURE — 250N000009 HC RX 250

## 2024-09-11 PROCEDURE — P9012 CRYOPRECIPITATE EACH UNIT: HCPCS | Performed by: OBSTETRICS & GYNECOLOGY

## 2024-09-11 PROCEDURE — 272N000001 HC OR GENERAL SUPPLY STERILE: Performed by: OBSTETRICS & GYNECOLOGY

## 2024-09-11 PROCEDURE — 258N000003 HC RX IP 258 OP 636: Performed by: CHIROPRACTOR

## 2024-09-11 PROCEDURE — 86923 COMPATIBILITY TEST ELECTRIC: CPT | Performed by: STUDENT IN AN ORGANIZED HEALTH CARE EDUCATION/TRAINING PROGRAM

## 2024-09-11 PROCEDURE — 258N000003 HC RX IP 258 OP 636

## 2024-09-11 PROCEDURE — P9059 PLASMA, FRZ BETWEEN 8-24HOUR: HCPCS

## 2024-09-11 PROCEDURE — 88302 TISSUE EXAM BY PATHOLOGIST: CPT | Mod: 26 | Performed by: PATHOLOGY

## 2024-09-11 PROCEDURE — 258N000003 HC RX IP 258 OP 636: Performed by: STUDENT IN AN ORGANIZED HEALTH CARE EDUCATION/TRAINING PROGRAM

## 2024-09-11 PROCEDURE — 999N000016 HC STATISTIC ATTENDANCE AT DELIVERY

## 2024-09-11 PROCEDURE — 88307 TISSUE EXAM BY PATHOLOGIST: CPT | Mod: 26 | Performed by: PATHOLOGY

## 2024-09-11 PROCEDURE — P9016 RBC LEUKOCYTES REDUCED: HCPCS

## 2024-09-11 PROCEDURE — 59514 CESAREAN DELIVERY ONLY: CPT | Performed by: ANESTHESIOLOGY

## 2024-09-11 PROCEDURE — 86923 COMPATIBILITY TEST ELECTRIC: CPT

## 2024-09-11 PROCEDURE — 250N000011 HC RX IP 250 OP 636

## 2024-09-11 PROCEDURE — 58611 LIGATE OVIDUCT(S) ADD-ON: CPT | Mod: GC | Performed by: OBSTETRICS & GYNECOLOGY

## 2024-09-11 PROCEDURE — 370N000017 HC ANESTHESIA TECHNICAL FEE, PER MIN: Performed by: OBSTETRICS & GYNECOLOGY

## 2024-09-11 PROCEDURE — 85027 COMPLETE CBC AUTOMATED: CPT

## 2024-09-11 PROCEDURE — 360N000076 HC SURGERY LEVEL 3, PER MIN: Performed by: OBSTETRICS & GYNECOLOGY

## 2024-09-11 PROCEDURE — 85007 BL SMEAR W/DIFF WBC COUNT: CPT

## 2024-09-11 RX ORDER — MODIFIED LANOLIN
OINTMENT (GRAM) TOPICAL
Status: DISCONTINUED | OUTPATIENT
Start: 2024-09-11 | End: 2024-09-13 | Stop reason: HOSPADM

## 2024-09-11 RX ORDER — NALOXONE HYDROCHLORIDE 0.4 MG/ML
0.2 INJECTION, SOLUTION INTRAMUSCULAR; INTRAVENOUS; SUBCUTANEOUS
Status: DISCONTINUED | OUTPATIENT
Start: 2024-09-11 | End: 2024-09-11

## 2024-09-11 RX ORDER — DEXAMETHASONE SODIUM PHOSPHATE 4 MG/ML
4 INJECTION, SOLUTION INTRA-ARTICULAR; INTRALESIONAL; INTRAMUSCULAR; INTRAVENOUS; SOFT TISSUE
Status: DISCONTINUED | OUTPATIENT
Start: 2024-09-11 | End: 2024-09-11

## 2024-09-11 RX ORDER — OXYTOCIN 10 [USP'U]/ML
10 INJECTION, SOLUTION INTRAMUSCULAR; INTRAVENOUS
Status: DISCONTINUED | OUTPATIENT
Start: 2024-09-11 | End: 2024-09-11 | Stop reason: HOSPADM

## 2024-09-11 RX ORDER — MISOPROSTOL 200 UG/1
400 TABLET ORAL
Status: DISCONTINUED | OUTPATIENT
Start: 2024-09-11 | End: 2024-09-13 | Stop reason: HOSPADM

## 2024-09-11 RX ORDER — OXYTOCIN 10 [USP'U]/ML
10 INJECTION, SOLUTION INTRAMUSCULAR; INTRAVENOUS
Status: DISCONTINUED | OUTPATIENT
Start: 2024-09-11 | End: 2024-09-13 | Stop reason: HOSPADM

## 2024-09-11 RX ORDER — FENTANYL CITRATE 50 UG/ML
25-50 INJECTION, SOLUTION INTRAMUSCULAR; INTRAVENOUS
Status: DISCONTINUED | OUTPATIENT
Start: 2024-09-11 | End: 2024-09-11

## 2024-09-11 RX ORDER — SODIUM CHLORIDE, SODIUM LACTATE, POTASSIUM CHLORIDE, CALCIUM CHLORIDE 600; 310; 30; 20 MG/100ML; MG/100ML; MG/100ML; MG/100ML
10-125 INJECTION, SOLUTION INTRAVENOUS CONTINUOUS
Status: DISCONTINUED | OUTPATIENT
Start: 2024-09-11 | End: 2024-09-12

## 2024-09-11 RX ORDER — BUPIVACAINE HYDROCHLORIDE 7.5 MG/ML
INJECTION, SOLUTION INTRASPINAL
Status: COMPLETED | OUTPATIENT
Start: 2024-09-11 | End: 2024-09-11

## 2024-09-11 RX ORDER — KETOROLAC TROMETHAMINE 30 MG/ML
INJECTION, SOLUTION INTRAMUSCULAR; INTRAVENOUS PRN
Status: DISCONTINUED | OUTPATIENT
Start: 2024-09-11 | End: 2024-09-11

## 2024-09-11 RX ORDER — ACETAMINOPHEN 325 MG/1
975 TABLET ORAL ONCE
Status: COMPLETED | OUTPATIENT
Start: 2024-09-11 | End: 2024-09-11

## 2024-09-11 RX ORDER — ONDANSETRON 2 MG/ML
4 INJECTION INTRAMUSCULAR; INTRAVENOUS EVERY 6 HOURS PRN
Status: DISCONTINUED | OUTPATIENT
Start: 2024-09-11 | End: 2024-09-13 | Stop reason: HOSPADM

## 2024-09-11 RX ORDER — AMOXICILLIN 250 MG
1 CAPSULE ORAL 2 TIMES DAILY
Status: DISCONTINUED | OUTPATIENT
Start: 2024-09-11 | End: 2024-09-13 | Stop reason: HOSPADM

## 2024-09-11 RX ORDER — METOCLOPRAMIDE 10 MG/1
10 TABLET ORAL EVERY 6 HOURS PRN
Status: DISCONTINUED | OUTPATIENT
Start: 2024-09-11 | End: 2024-09-13 | Stop reason: HOSPADM

## 2024-09-11 RX ORDER — NALOXONE HYDROCHLORIDE 0.4 MG/ML
0.4 INJECTION, SOLUTION INTRAMUSCULAR; INTRAVENOUS; SUBCUTANEOUS
Status: DISCONTINUED | OUTPATIENT
Start: 2024-09-11 | End: 2024-09-13 | Stop reason: HOSPADM

## 2024-09-11 RX ORDER — ACETAMINOPHEN 325 MG/1
975 TABLET ORAL EVERY 6 HOURS
Status: DISCONTINUED | OUTPATIENT
Start: 2024-09-11 | End: 2024-09-13 | Stop reason: HOSPADM

## 2024-09-11 RX ORDER — SODIUM CHLORIDE, SODIUM LACTATE, POTASSIUM CHLORIDE, CALCIUM CHLORIDE 600; 310; 30; 20 MG/100ML; MG/100ML; MG/100ML; MG/100ML
INJECTION, SOLUTION INTRAVENOUS CONTINUOUS
Status: DISCONTINUED | OUTPATIENT
Start: 2024-09-11 | End: 2024-09-11

## 2024-09-11 RX ORDER — AMOXICILLIN 250 MG
2 CAPSULE ORAL 2 TIMES DAILY
Status: DISCONTINUED | OUTPATIENT
Start: 2024-09-11 | End: 2024-09-13 | Stop reason: HOSPADM

## 2024-09-11 RX ORDER — LIDOCAINE 40 MG/G
CREAM TOPICAL
Status: DISCONTINUED | OUTPATIENT
Start: 2024-09-11 | End: 2024-09-13 | Stop reason: HOSPADM

## 2024-09-11 RX ORDER — MORPHINE SULFATE 1 MG/ML
150 INJECTION, SOLUTION EPIDURAL; INTRATHECAL; INTRAVENOUS ONCE
Status: DISCONTINUED | OUTPATIENT
Start: 2024-09-11 | End: 2024-09-11

## 2024-09-11 RX ORDER — SODIUM PHOSPHATE,MONO-DIBASIC 19G-7G/118
1 ENEMA (ML) RECTAL DAILY PRN
Status: DISCONTINUED | OUTPATIENT
Start: 2024-09-13 | End: 2024-09-13 | Stop reason: HOSPADM

## 2024-09-11 RX ORDER — BUPIVACAINE HYDROCHLORIDE 2.5 MG/ML
INJECTION, SOLUTION EPIDURAL; INFILTRATION; INTRACAUDAL
Status: COMPLETED | OUTPATIENT
Start: 2024-09-11 | End: 2024-09-11

## 2024-09-11 RX ORDER — OXYTOCIN/0.9 % SODIUM CHLORIDE 30/500 ML
100-340 PLASTIC BAG, INJECTION (ML) INTRAVENOUS CONTINUOUS PRN
Status: DISCONTINUED | OUTPATIENT
Start: 2024-09-11 | End: 2024-09-13 | Stop reason: HOSPADM

## 2024-09-11 RX ORDER — LIDOCAINE 40 MG/G
CREAM TOPICAL
Status: DISCONTINUED | OUTPATIENT
Start: 2024-09-11 | End: 2024-09-11 | Stop reason: HOSPADM

## 2024-09-11 RX ORDER — MISOPROSTOL 200 UG/1
800 TABLET ORAL
Status: DISCONTINUED | OUTPATIENT
Start: 2024-09-11 | End: 2024-09-13 | Stop reason: HOSPADM

## 2024-09-11 RX ORDER — CITRIC ACID/SODIUM CITRATE 334-500MG
30 SOLUTION, ORAL ORAL
Status: COMPLETED | OUTPATIENT
Start: 2024-09-11 | End: 2024-09-11

## 2024-09-11 RX ORDER — NALBUPHINE HYDROCHLORIDE 10 MG/ML
INJECTION, SOLUTION INTRAMUSCULAR; INTRAVENOUS; SUBCUTANEOUS
Status: DISCONTINUED
Start: 2024-09-11 | End: 2024-09-11 | Stop reason: HOSPADM

## 2024-09-11 RX ORDER — MORPHINE SULFATE 1 MG/ML
INJECTION, SOLUTION EPIDURAL; INTRATHECAL; INTRAVENOUS
Status: COMPLETED | OUTPATIENT
Start: 2024-09-11 | End: 2024-09-11

## 2024-09-11 RX ORDER — ONDANSETRON 2 MG/ML
4 INJECTION INTRAMUSCULAR; INTRAVENOUS EVERY 6 HOURS PRN
Status: DISCONTINUED | OUTPATIENT
Start: 2024-09-11 | End: 2024-09-11

## 2024-09-11 RX ORDER — METOCLOPRAMIDE HYDROCHLORIDE 5 MG/ML
10 INJECTION INTRAMUSCULAR; INTRAVENOUS EVERY 6 HOURS PRN
Status: DISCONTINUED | OUTPATIENT
Start: 2024-09-11 | End: 2024-09-11

## 2024-09-11 RX ORDER — IBUPROFEN 800 MG/1
800 TABLET, FILM COATED ORAL EVERY 6 HOURS
Status: DISCONTINUED | OUTPATIENT
Start: 2024-09-12 | End: 2024-09-13 | Stop reason: HOSPADM

## 2024-09-11 RX ORDER — CEFAZOLIN SODIUM/WATER 2 G/20 ML
2 SYRINGE (ML) INTRAVENOUS
Status: COMPLETED | OUTPATIENT
Start: 2024-09-11 | End: 2024-09-11

## 2024-09-11 RX ORDER — LIDOCAINE 40 MG/G
CREAM TOPICAL
Status: DISCONTINUED | OUTPATIENT
Start: 2024-09-11 | End: 2024-09-11

## 2024-09-11 RX ORDER — NALOXONE HYDROCHLORIDE 0.4 MG/ML
0.4 INJECTION, SOLUTION INTRAMUSCULAR; INTRAVENOUS; SUBCUTANEOUS
Status: DISCONTINUED | OUTPATIENT
Start: 2024-09-11 | End: 2024-09-11

## 2024-09-11 RX ORDER — METHYLERGONOVINE MALEATE 0.2 MG/ML
200 INJECTION INTRAVENOUS
Status: DISCONTINUED | OUTPATIENT
Start: 2024-09-11 | End: 2024-09-13 | Stop reason: HOSPADM

## 2024-09-11 RX ORDER — SODIUM CHLORIDE, SODIUM LACTATE, POTASSIUM CHLORIDE, CALCIUM CHLORIDE 600; 310; 30; 20 MG/100ML; MG/100ML; MG/100ML; MG/100ML
INJECTION, SOLUTION INTRAVENOUS CONTINUOUS
Status: DISCONTINUED | OUTPATIENT
Start: 2024-09-11 | End: 2024-09-11 | Stop reason: HOSPADM

## 2024-09-11 RX ORDER — KETOROLAC TROMETHAMINE 30 MG/ML
INJECTION, SOLUTION INTRAMUSCULAR; INTRAVENOUS
Status: DISCONTINUED
Start: 2024-09-11 | End: 2024-09-11 | Stop reason: HOSPADM

## 2024-09-11 RX ORDER — BISACODYL 10 MG
10 SUPPOSITORY, RECTAL RECTAL DAILY PRN
Status: DISCONTINUED | OUTPATIENT
Start: 2024-09-13 | End: 2024-09-13 | Stop reason: HOSPADM

## 2024-09-11 RX ORDER — ONDANSETRON 2 MG/ML
4 INJECTION INTRAMUSCULAR; INTRAVENOUS EVERY 30 MIN PRN
Status: DISCONTINUED | OUTPATIENT
Start: 2024-09-11 | End: 2024-09-11

## 2024-09-11 RX ORDER — OXYCODONE HYDROCHLORIDE 5 MG/1
10 TABLET ORAL
Status: DISCONTINUED | OUTPATIENT
Start: 2024-09-11 | End: 2024-09-11

## 2024-09-11 RX ORDER — PROCHLORPERAZINE 25 MG
25 SUPPOSITORY, RECTAL RECTAL EVERY 12 HOURS PRN
Status: DISCONTINUED | OUTPATIENT
Start: 2024-09-11 | End: 2024-09-11

## 2024-09-11 RX ORDER — METHYLERGONOVINE MALEATE 0.2 MG/ML
200 INJECTION INTRAVENOUS
Status: DISCONTINUED | OUTPATIENT
Start: 2024-09-11 | End: 2024-09-11 | Stop reason: HOSPADM

## 2024-09-11 RX ORDER — NALOXONE HYDROCHLORIDE 0.4 MG/ML
0.1 INJECTION, SOLUTION INTRAMUSCULAR; INTRAVENOUS; SUBCUTANEOUS
Status: DISCONTINUED | OUTPATIENT
Start: 2024-09-11 | End: 2024-09-11

## 2024-09-11 RX ORDER — CARBOPROST TROMETHAMINE 250 UG/ML
250 INJECTION, SOLUTION INTRAMUSCULAR
Status: DISCONTINUED | OUTPATIENT
Start: 2024-09-11 | End: 2024-09-11 | Stop reason: HOSPADM

## 2024-09-11 RX ORDER — OXYTOCIN/0.9 % SODIUM CHLORIDE 30/500 ML
340 PLASTIC BAG, INJECTION (ML) INTRAVENOUS CONTINUOUS PRN
Status: DISCONTINUED | OUTPATIENT
Start: 2024-09-11 | End: 2024-09-13 | Stop reason: HOSPADM

## 2024-09-11 RX ORDER — MISOPROSTOL 200 UG/1
400 TABLET ORAL
Status: DISCONTINUED | OUTPATIENT
Start: 2024-09-11 | End: 2024-09-11 | Stop reason: HOSPADM

## 2024-09-11 RX ORDER — ACETAMINOPHEN 325 MG/1
TABLET ORAL
Status: DISCONTINUED
Start: 2024-09-11 | End: 2024-09-11 | Stop reason: HOSPADM

## 2024-09-11 RX ORDER — LOPERAMIDE HCL 2 MG
4 CAPSULE ORAL
Status: DISCONTINUED | OUTPATIENT
Start: 2024-09-11 | End: 2024-09-13 | Stop reason: HOSPADM

## 2024-09-11 RX ORDER — ONDANSETRON 4 MG/1
4 TABLET, ORALLY DISINTEGRATING ORAL EVERY 6 HOURS PRN
Status: DISCONTINUED | OUTPATIENT
Start: 2024-09-11 | End: 2024-09-13 | Stop reason: HOSPADM

## 2024-09-11 RX ORDER — NALBUPHINE HYDROCHLORIDE 10 MG/ML
2.5-5 INJECTION, SOLUTION INTRAMUSCULAR; INTRAVENOUS; SUBCUTANEOUS EVERY 6 HOURS PRN
Status: DISCONTINUED | OUTPATIENT
Start: 2024-09-11 | End: 2024-09-13 | Stop reason: HOSPADM

## 2024-09-11 RX ORDER — SODIUM CHLORIDE 9 MG/ML
INJECTION, SOLUTION INTRAVENOUS
Status: DISCONTINUED
Start: 2024-09-11 | End: 2024-09-11 | Stop reason: WASHOUT

## 2024-09-11 RX ORDER — LABETALOL 200 MG/1
200 TABLET, FILM COATED ORAL 3 TIMES DAILY
Status: DISCONTINUED | OUTPATIENT
Start: 2024-09-11 | End: 2024-09-13 | Stop reason: HOSPADM

## 2024-09-11 RX ORDER — OXYTOCIN/0.9 % SODIUM CHLORIDE 30/500 ML
PLASTIC BAG, INJECTION (ML) INTRAVENOUS CONTINUOUS PRN
Status: DISCONTINUED | OUTPATIENT
Start: 2024-09-11 | End: 2024-09-11

## 2024-09-11 RX ORDER — ONDANSETRON 4 MG/1
4 TABLET, ORALLY DISINTEGRATING ORAL EVERY 6 HOURS PRN
Status: DISCONTINUED | OUTPATIENT
Start: 2024-09-11 | End: 2024-09-11

## 2024-09-11 RX ORDER — FENTANYL CITRATE 50 UG/ML
15 INJECTION, SOLUTION INTRAMUSCULAR; INTRAVENOUS ONCE
Status: DISCONTINUED | OUTPATIENT
Start: 2024-09-11 | End: 2024-09-11

## 2024-09-11 RX ORDER — FENTANYL CITRATE 50 UG/ML
INJECTION, SOLUTION INTRAMUSCULAR; INTRAVENOUS
Status: COMPLETED | OUTPATIENT
Start: 2024-09-11 | End: 2024-09-11

## 2024-09-11 RX ORDER — NALOXONE HYDROCHLORIDE 0.4 MG/ML
0.2 INJECTION, SOLUTION INTRAMUSCULAR; INTRAVENOUS; SUBCUTANEOUS
Status: DISCONTINUED | OUTPATIENT
Start: 2024-09-11 | End: 2024-09-13 | Stop reason: HOSPADM

## 2024-09-11 RX ORDER — LABETALOL 200 MG/1
200 TABLET, FILM COATED ORAL 3 TIMES DAILY
Status: DISCONTINUED | OUTPATIENT
Start: 2024-09-11 | End: 2024-09-11

## 2024-09-11 RX ORDER — DEXTROSE, SODIUM CHLORIDE, SODIUM LACTATE, POTASSIUM CHLORIDE, AND CALCIUM CHLORIDE 5; .6; .31; .03; .02 G/100ML; G/100ML; G/100ML; G/100ML; G/100ML
INJECTION, SOLUTION INTRAVENOUS CONTINUOUS
Status: DISCONTINUED | OUTPATIENT
Start: 2024-09-11 | End: 2024-09-12

## 2024-09-11 RX ORDER — PROCHLORPERAZINE MALEATE 10 MG
10 TABLET ORAL EVERY 6 HOURS PRN
Status: DISCONTINUED | OUTPATIENT
Start: 2024-09-11 | End: 2024-09-11

## 2024-09-11 RX ORDER — TRANEXAMIC ACID 10 MG/ML
1 INJECTION, SOLUTION INTRAVENOUS EVERY 30 MIN PRN
Status: DISCONTINUED | OUTPATIENT
Start: 2024-09-11 | End: 2024-09-11 | Stop reason: HOSPADM

## 2024-09-11 RX ORDER — MISOPROSTOL 200 UG/1
800 TABLET ORAL
Status: DISCONTINUED | OUTPATIENT
Start: 2024-09-11 | End: 2024-09-11 | Stop reason: HOSPADM

## 2024-09-11 RX ORDER — LOPERAMIDE HCL 2 MG
2 CAPSULE ORAL
Status: DISCONTINUED | OUTPATIENT
Start: 2024-09-11 | End: 2024-09-13 | Stop reason: HOSPADM

## 2024-09-11 RX ORDER — OXYCODONE HYDROCHLORIDE 5 MG/1
5 TABLET ORAL
Status: DISCONTINUED | OUTPATIENT
Start: 2024-09-11 | End: 2024-09-11

## 2024-09-11 RX ORDER — CARBOPROST TROMETHAMINE 250 UG/ML
250 INJECTION, SOLUTION INTRAMUSCULAR
Status: DISCONTINUED | OUTPATIENT
Start: 2024-09-11 | End: 2024-09-13 | Stop reason: HOSPADM

## 2024-09-11 RX ORDER — OXYCODONE HYDROCHLORIDE 5 MG/1
TABLET ORAL
Status: DISCONTINUED
Start: 2024-09-11 | End: 2024-09-11 | Stop reason: HOSPADM

## 2024-09-11 RX ORDER — NALBUPHINE HYDROCHLORIDE 10 MG/ML
2.5-5 INJECTION, SOLUTION INTRAMUSCULAR; INTRAVENOUS; SUBCUTANEOUS EVERY 6 HOURS PRN
Status: DISCONTINUED | OUTPATIENT
Start: 2024-09-11 | End: 2024-09-11

## 2024-09-11 RX ORDER — PROCHLORPERAZINE 25 MG
25 SUPPOSITORY, RECTAL RECTAL EVERY 12 HOURS PRN
Status: DISCONTINUED | OUTPATIENT
Start: 2024-09-11 | End: 2024-09-13 | Stop reason: HOSPADM

## 2024-09-11 RX ORDER — METOCLOPRAMIDE HYDROCHLORIDE 5 MG/ML
10 INJECTION INTRAMUSCULAR; INTRAVENOUS EVERY 6 HOURS PRN
Status: DISCONTINUED | OUTPATIENT
Start: 2024-09-11 | End: 2024-09-13 | Stop reason: HOSPADM

## 2024-09-11 RX ORDER — ONDANSETRON 2 MG/ML
INJECTION INTRAMUSCULAR; INTRAVENOUS PRN
Status: DISCONTINUED | OUTPATIENT
Start: 2024-09-11 | End: 2024-09-11

## 2024-09-11 RX ORDER — SODIUM CHLORIDE, SODIUM LACTATE, POTASSIUM CHLORIDE, CALCIUM CHLORIDE 600; 310; 30; 20 MG/100ML; MG/100ML; MG/100ML; MG/100ML
INJECTION, SOLUTION INTRAVENOUS CONTINUOUS
Status: DISCONTINUED | OUTPATIENT
Start: 2024-09-11 | End: 2024-09-12

## 2024-09-11 RX ORDER — PROCHLORPERAZINE MALEATE 10 MG
10 TABLET ORAL EVERY 6 HOURS PRN
Status: DISCONTINUED | OUTPATIENT
Start: 2024-09-11 | End: 2024-09-13 | Stop reason: HOSPADM

## 2024-09-11 RX ORDER — FENTANYL CITRATE-0.9 % NACL/PF 10 MCG/ML
100 PLASTIC BAG, INJECTION (ML) INTRAVENOUS EVERY 5 MIN PRN
Status: DISCONTINUED | OUTPATIENT
Start: 2024-09-11 | End: 2024-09-11

## 2024-09-11 RX ORDER — METOCLOPRAMIDE 10 MG/1
10 TABLET ORAL EVERY 6 HOURS PRN
Status: DISCONTINUED | OUTPATIENT
Start: 2024-09-11 | End: 2024-09-11

## 2024-09-11 RX ORDER — SIMETHICONE 80 MG
80 TABLET,CHEWABLE ORAL 4 TIMES DAILY PRN
Status: DISCONTINUED | OUTPATIENT
Start: 2024-09-11 | End: 2024-09-13 | Stop reason: HOSPADM

## 2024-09-11 RX ORDER — ONDANSETRON 4 MG/1
4 TABLET, ORALLY DISINTEGRATING ORAL EVERY 30 MIN PRN
Status: DISCONTINUED | OUTPATIENT
Start: 2024-09-11 | End: 2024-09-11

## 2024-09-11 RX ORDER — FLUMAZENIL 0.1 MG/ML
0.2 INJECTION, SOLUTION INTRAVENOUS
Status: DISCONTINUED | OUTPATIENT
Start: 2024-09-11 | End: 2024-09-11

## 2024-09-11 RX ORDER — LOPERAMIDE HCL 2 MG
2 CAPSULE ORAL
Status: DISCONTINUED | OUTPATIENT
Start: 2024-09-11 | End: 2024-09-11 | Stop reason: HOSPADM

## 2024-09-11 RX ORDER — LABETALOL HYDROCHLORIDE 5 MG/ML
20-80 INJECTION, SOLUTION INTRAVENOUS EVERY 10 MIN PRN
Status: DISCONTINUED | OUTPATIENT
Start: 2024-09-11 | End: 2024-09-13 | Stop reason: HOSPADM

## 2024-09-11 RX ORDER — LOPERAMIDE HCL 2 MG
4 CAPSULE ORAL
Status: COMPLETED | OUTPATIENT
Start: 2024-09-11 | End: 2024-09-11

## 2024-09-11 RX ORDER — HYDRALAZINE HYDROCHLORIDE 20 MG/ML
10 INJECTION INTRAMUSCULAR; INTRAVENOUS
Status: DISCONTINUED | OUTPATIENT
Start: 2024-09-11 | End: 2024-09-13 | Stop reason: HOSPADM

## 2024-09-11 RX ORDER — OXYTOCIN/0.9 % SODIUM CHLORIDE 30/500 ML
340 PLASTIC BAG, INJECTION (ML) INTRAVENOUS CONTINUOUS PRN
Status: DISCONTINUED | OUTPATIENT
Start: 2024-09-11 | End: 2024-09-11 | Stop reason: HOSPADM

## 2024-09-11 RX ORDER — SODIUM CHLORIDE, SODIUM LACTATE, POTASSIUM CHLORIDE, CALCIUM CHLORIDE 600; 310; 30; 20 MG/100ML; MG/100ML; MG/100ML; MG/100ML
INJECTION, SOLUTION INTRAVENOUS CONTINUOUS PRN
Status: DISCONTINUED | OUTPATIENT
Start: 2024-09-11 | End: 2024-09-11

## 2024-09-11 RX ORDER — TRANEXAMIC ACID 10 MG/ML
1 INJECTION, SOLUTION INTRAVENOUS EVERY 30 MIN PRN
Status: DISCONTINUED | OUTPATIENT
Start: 2024-09-11 | End: 2024-09-13 | Stop reason: HOSPADM

## 2024-09-11 RX ORDER — HYDROCORTISONE 25 MG/G
CREAM TOPICAL 3 TIMES DAILY PRN
Status: DISCONTINUED | OUTPATIENT
Start: 2024-09-11 | End: 2024-09-13 | Stop reason: HOSPADM

## 2024-09-11 RX ORDER — KETOROLAC TROMETHAMINE 30 MG/ML
30 INJECTION, SOLUTION INTRAMUSCULAR; INTRAVENOUS EVERY 6 HOURS
Status: ACTIVE | OUTPATIENT
Start: 2024-09-11 | End: 2024-09-12

## 2024-09-11 RX ORDER — LABETALOL 300 MG/1
300 TABLET, FILM COATED ORAL 3 TIMES DAILY
Status: DISCONTINUED | OUTPATIENT
Start: 2024-09-11 | End: 2024-09-11

## 2024-09-11 RX ORDER — OXYCODONE HYDROCHLORIDE 5 MG/1
5 TABLET ORAL EVERY 4 HOURS PRN
Status: DISCONTINUED | OUTPATIENT
Start: 2024-09-11 | End: 2024-09-13 | Stop reason: HOSPADM

## 2024-09-11 RX ADMIN — SODIUM CHLORIDE, POTASSIUM CHLORIDE, SODIUM LACTATE AND CALCIUM CHLORIDE 1000 ML: 600; 310; 30; 20 INJECTION, SOLUTION INTRAVENOUS at 11:45

## 2024-09-11 RX ADMIN — ACETAMINOPHEN 975 MG: 325 TABLET ORAL at 14:20

## 2024-09-11 RX ADMIN — LABETALOL HYDROCHLORIDE 200 MG: 200 TABLET, FILM COATED ORAL at 12:46

## 2024-09-11 RX ADMIN — PHENYLEPHRINE HYDROCHLORIDE 45 MCG/MIN: 10 INJECTION INTRAVENOUS at 08:51

## 2024-09-11 RX ADMIN — Medication 2 G: at 10:50

## 2024-09-11 RX ADMIN — BUPIVACAINE HYDROCHLORIDE 20 ML: 2.5 INJECTION, SOLUTION EPIDURAL; INFILTRATION; INTRACAUDAL at 11:10

## 2024-09-11 RX ADMIN — NALBUPHINE HYDROCHLORIDE 2.5 MG: 10 INJECTION, SOLUTION INTRAMUSCULAR; INTRAVENOUS; SUBCUTANEOUS at 18:53

## 2024-09-11 RX ADMIN — SODIUM CHLORIDE, POTASSIUM CHLORIDE, SODIUM LACTATE AND CALCIUM CHLORIDE: 600; 310; 30; 20 INJECTION, SOLUTION INTRAVENOUS at 19:28

## 2024-09-11 RX ADMIN — Medication 2 G: at 08:52

## 2024-09-11 RX ADMIN — KETOROLAC TROMETHAMINE 15 MG: 30 INJECTION, SOLUTION INTRAMUSCULAR at 10:44

## 2024-09-11 RX ADMIN — SODIUM CHLORIDE, POTASSIUM CHLORIDE, SODIUM LACTATE AND CALCIUM CHLORIDE: 600; 310; 30; 20 INJECTION, SOLUTION INTRAVENOUS at 14:10

## 2024-09-11 RX ADMIN — ACETAMINOPHEN 975 MG: 325 TABLET ORAL at 08:36

## 2024-09-11 RX ADMIN — FENTANYL CITRATE 15 MCG: 50 INJECTION INTRAMUSCULAR; INTRAVENOUS at 08:51

## 2024-09-11 RX ADMIN — Medication 600 ML/HR: at 10:19

## 2024-09-11 RX ADMIN — OXYCODONE HYDROCHLORIDE 5 MG: 5 TABLET ORAL at 16:49

## 2024-09-11 RX ADMIN — TRANEXAMIC ACID 1 G: 1 INJECTION, SOLUTION INTRAVENOUS at 10:14

## 2024-09-11 RX ADMIN — CARBOPROST TROMETHAMINE 250 MCG: 250 INJECTION, SOLUTION INTRAMUSCULAR at 11:01

## 2024-09-11 RX ADMIN — LABETALOL HYDROCHLORIDE 200 MG: 200 TABLET, FILM COATED ORAL at 19:49

## 2024-09-11 RX ADMIN — OXYCODONE HYDROCHLORIDE 5 MG: 5 TABLET ORAL at 21:20

## 2024-09-11 RX ADMIN — BUPIVACAINE HYDROCHLORIDE IN DEXTROSE 1.6 ML: 7.5 INJECTION, SOLUTION SUBARACHNOID at 08:51

## 2024-09-11 RX ADMIN — BUPIVACAINE 20 ML: 13.3 INJECTION, SUSPENSION, LIPOSOMAL INFILTRATION at 11:10

## 2024-09-11 RX ADMIN — SODIUM CHLORIDE, POTASSIUM CHLORIDE, SODIUM LACTATE AND CALCIUM CHLORIDE: 600; 310; 30; 20 INJECTION, SOLUTION INTRAVENOUS at 07:02

## 2024-09-11 RX ADMIN — MORPHINE SULFATE 0.15 MG: 1 INJECTION EPIDURAL; INTRATHECAL; INTRAVENOUS at 08:51

## 2024-09-11 RX ADMIN — SENNOSIDES AND DOCUSATE SODIUM 2 TABLET: 50; 8.6 TABLET ORAL at 21:20

## 2024-09-11 RX ADMIN — SODIUM CITRATE AND CITRIC ACID MONOHYDRATE 30 ML: 500; 334 SOLUTION ORAL at 08:36

## 2024-09-11 RX ADMIN — LOPERAMIDE HYDROCHLORIDE 4 MG: 2 CAPSULE ORAL at 11:45

## 2024-09-11 RX ADMIN — ONDANSETRON 4 MG: 2 INJECTION INTRAMUSCULAR; INTRAVENOUS at 08:47

## 2024-09-11 RX ADMIN — Medication 600 ML/HR: at 09:29

## 2024-09-11 RX ADMIN — SODIUM CHLORIDE, POTASSIUM CHLORIDE, SODIUM LACTATE AND CALCIUM CHLORIDE: 600; 310; 30; 20 INJECTION, SOLUTION INTRAVENOUS at 08:39

## 2024-09-11 RX ADMIN — ACETAMINOPHEN 975 MG: 325 TABLET ORAL at 21:19

## 2024-09-11 ASSESSMENT — ACTIVITIES OF DAILY LIVING (ADL)
ADLS_ACUITY_SCORE: 20
ADLS_ACUITY_SCORE: 20
ADLS_ACUITY_SCORE: 22
ADLS_ACUITY_SCORE: 35
ADLS_ACUITY_SCORE: 25
DOING_ERRANDS_INDEPENDENTLY_DIFFICULTY: NO
ADLS_ACUITY_SCORE: 25
ADLS_ACUITY_SCORE: 22
ADLS_ACUITY_SCORE: 22
ADLS_ACUITY_SCORE: 25
ADLS_ACUITY_SCORE: 22

## 2024-09-11 NOTE — DISCHARGE SUMMARY
[facility] Discharge Summary    Patient: Murali Norwood    YOB: 1980   MRN# [id number]           Date of Admission:  2024  Date of Discharge::  2024  Admitting Physician:  Marisol Olivier MD  Discharge Physician:  Shannon López MD          Admission Diagnoses:   - Intrauterine pregnancy at 34w4d  - triplet gestation (tri tri)  - cHTN  - History of fatty liver disease  - History of myomectomy  - Advanced maternal age  - Fetal growth restriction of fetus 3 with normal doppler  - Satisfied parity, FTP           Discharge Diagnosis:   - Same, delivered   - Postpartum hemorrhage  - Acute blood loss anemia requiring transfusion  - DIC         Procedures:     Procedure(s): Primary low transverse  section with two layer closure via Pfannenstiel skin incision  Bilateral salpingectomy            Medications Prior to Admission:     Medications Prior to Admission   Medication Sig Dispense Refill Last Dose    Prenatal Vit-Fe Fumarate-FA (PRENATAL MULTIVITAMIN  PLUS IRON) 27-1 MG TABS Take by mouth daily   Unknown    Prenatal Vit-Fe Fumarate-FA (PRENATAL MULTIVITAMIN W/IRON) 27-0.8 MG tablet Take 1 tablet by mouth daily 90 tablet 3 9/10/2024    [DISCONTINUED] ferrous sulfate (FEROSUL) 325 (65 Fe) MG tablet Take 325 mg by mouth daily (with breakfast)   9/10/2024    [DISCONTINUED] labetalol (NORMODYNE) 100 MG tablet Take 1 tablet (100 mg) by mouth 3 times daily (Patient taking differently: Take 100 mg by mouth 3 times daily. Pt taking 200 mg TID) 90 tablet 1 9/10/2024    [DISCONTINUED] labetalol (NORMODYNE) 100 MG tablet Take 100 mg by mouth 3 times daily.   Unknown             Discharge Medications:        Review of your medicines         Important    This medication list is not yet final, because your doctor or pharmacist is still double-checking some of the changes. Ask your nurse for an updated  version.  Specifically ask about this and similar medications: oxyCODONE (ROXICODONE) 5 MG tablet       START taking        Dose / Directions   acetaminophen 325 MG tablet  Commonly known as: TYLENOL  Used for: Chronic hypertension      Dose: 650 mg  Take 2 tablets (650 mg) by mouth every 6 hours as needed for mild pain. Start after Delivery.  Quantity: 100 tablet  Refills: 0     cyclobenzaprine 10 MG tablet  Commonly known as: FLEXERIL  Used for: Chronic hypertension      Dose: 10 mg  Take 1 tablet (10 mg) by mouth 3 times daily as needed for muscle spasms or other (abdominal pain).  Quantity: 30 tablet  Refills: 0     ibuprofen 600 MG tablet  Commonly known as: ADVIL/MOTRIN  Used for: Chronic hypertension      Dose: 600 mg  Take 1 tablet (600 mg) by mouth every 6 hours as needed for moderate pain. Start after delivery  Quantity: 60 tablet  Refills: 0     oxyCODONE 5 MG tablet  Commonly known as: ROXICODONE      Dose: 5 mg  Take 1 tablet (5 mg) by mouth every 6 hours as needed for pain.  Quantity: 6 tablet  Refills: 0     polyethylene glycol 17 GM/Dose powder  Commonly known as: MIRALAX  Used for: Chronic hypertension      Dose: 1 Capful  Take 17 g (1 Capful) by mouth 2 times daily as needed for constipation.  Quantity: 510 g  Refills: 0     senna-docusate 8.6-50 MG tablet  Commonly known as: SENOKOT-S/PERICOLACE  Used for: Chronic hypertension      Dose: 1 tablet  Take 1 tablet by mouth daily. Start after delivery.  Quantity: 100 tablet  Refills: 0            CONTINUE these medicines which may have CHANGED, or have new prescriptions. If we are uncertain of the size of tablets/capsules you have at home, strength may be listed as something that might have changed.        Dose / Directions   labetalol 100 MG tablet  Commonly known as: NORMODYNE  This may have changed:   how much to take  Another medication with the same name was removed. Continue taking this medication, and follow the directions you see here.  Used  for: Trichorionic triamniotic triplet pregnancy, antepartum, Multigravida of advanced maternal age in third trimester      Dose: 200 mg  Take 2 tablets (200 mg) by mouth 3 times daily.  Quantity: 90 tablet  Refills: 1            CONTINUE these medicines which have NOT CHANGED        Dose / Directions   ferrous sulfate 325 (65 Fe) MG tablet  Commonly known as: FEROSUL  Used for: Anemia due to blood loss, acute, Chronic hypertension      Dose: 325 mg  Take 1 tablet (325 mg) by mouth daily (with breakfast).  Quantity: 90 tablet  Refills: 0     * prenatal multivitamin w/iron 27-0.8 MG tablet  Used for: Trichorionic triamniotic triplet pregnancy, antepartum, Multigravida of advanced maternal age in third trimester      Dose: 1 tablet  Take 1 tablet by mouth daily  Quantity: 90 tablet  Refills: 3     * prenatal multivitamin  plus iron 27-1 MG Tabs      Take by mouth daily  Refills: 0           * This list has 2 medication(s) that are the same as other medications prescribed for you. Read the directions carefully, and ask your doctor or other care provider to review them with you.                   Where to get your medicines        These medications were sent to [facility] [address]  [address] [address]      Phone: [phone]   acetaminophen 325 MG tablet  cyclobenzaprine 10 MG tablet  ferrous sulfate 325 (65 Fe) MG tablet  ibuprofen 600 MG tablet  labetalol 100 MG tablet  polyethylene glycol 17 GM/Dose powder  senna-docusate 8.6-50 MG tablet       Information about where to get these medications is not yet available    Ask your nurse or doctor about these medications  oxyCODONE 5 MG tablet            Consultations:   - Anesthesia   - Lactation          Brief Admission History:   Ms. Murali Norwood is a 44 year old now P5 who initially presented at 34w4d for scheduled primary  delivery and bilateral salpingectomy. Pregnancy otherwise  notable for tri-tri triplet gestation with FGR of fetus 3, cHTN, AMA, history of myomectomy, history of fatty liver disease.        Intraoperative course   The procedure was complicated by postpartum hemorrhage. QBL 2908 mL.  See operative report for details.     Operative Findings:   - Dense subcutaneous scarring with indistinct separation from the underlying fascia.  - Dense rectofascial adhesions.  - Minimal adhesions between bladder and anterior lower uterine segment.  - Baby 1: viable male infant in cephalic presentation born at 9:25 AM on 9/11/2024. Clear amniotic fluid. APGARs 5/9 at one and five minutes. Birth weight 1,870g. Arterial cord pH 7.23, base deficit 3.7. Venous cord pH 7.24, base deficit 5.2.  - Baby 2: viable male infant in breech presentation born at 9:26 AM on 9/11/2024. Clear amniotic fluid. APGARs 8/9 at one and five minutes. Birth weight 1,980g. Venous cord pH 7.23, base deficit 5.1.  - Baby 3: viable female infant in cephalic presentation born at 9:28 AM on 9/11/2024. Clear amniotic fluid. APGARs 7/8 at one and five minutes. Birth weight 1,780g. Arterial cord pH 7.20, base deficit unable to calculate.   - Placentas delivered intact at once with fundal massage and gentle cord traction.  - Large uterus with scarring and dimpling along at anterior fundus from prior myomectomy. Normal appearing fallopian tubes and ovaries with small filmy adnexal adhesions. Bilateral fallopian tubes removed with hemostatic pedicles.  - Mild uterine atony initially improved with Pitocin and fundal massage.  - Subcentimeter peritoneal inclusion cyst above the bladder, removed.  - Diffuse oozing from the subcutaneous tissue, bilateral rectus muscles, peritoneal edges, and areas of scarring.  - 3-0 Vicryl thrown around left rectus muscle for tamponade, plus additional figure of X superficially.   - Surgifoam placed and Floseal applied on top of bilateral rectus muscles to ensure hemostasis from raw edges.  - Fascia  closed with 0 looped PDS x2 from bilateral corners brought together in the midline.  - Subcutaneous tissue approximated with 3-0 Vicryl.  - Skin reapproximated with 4-0 Monocryl and covered with steri strips.  - Fundal exam at end of case with expression of large clot, ~600 mL with lower uterine segment atony identified for which hemabate and rectal misoprostol were given.       Postpartum Course   The patient's hospital course was notable for acute blood loss anemia requiring transfusion.  On discharge, her pain was well controlled. Vaginal bleeding is similar to peak menstrual flow.  Voiding without difficulty.  Ambulating well and tolerating a normal diet.  No fever or significant wound drainage.  Pumping with plans to breast feed. Infants are stable and doing well in the NICU. She was discharged on post-partum day #2.    Post-partum hemoglobin: 6.4> 1U pRBC> not rechecked          Discharge Instructions and Follow-Up:     Discharge diet: Regular   Discharge activity: No lifting greater than 20 lbs, pushing, pulling, or other strenuous activity for 6 weeks. Pelvic rest for 6 weeks including no sexual intercourse, tampons, or douching. No driving until you can slam on the breaks without pain or while on narcotic pain medications.    Discharge follow-up: Follow up with primary OB for incision check in 2 weeks, and routine postpartum visit in 6 weeks.  Follow up in 3-5 days for BP check   Wound care: Keep incision clean and dry           Discharge Disposition:     Discharged to home      Irina Javier MD  Obstetrics & Gynecology, PGY-3  9/13/2024 3:01 PM     I have seen, examined, and counseled the patient on the day of discharge. I have reviewed and edited the summary.  Shannon López

## 2024-09-11 NOTE — PROVIDER NOTIFICATION
09/11/24 1230   Provider Notification   Provider Name/Title Dr. Javier   Method of Notification Electronic Page   Request Evaluate in Person   Notification Reason Status Update     RN updating MD on patient status and additional blood loss of 80 mL. MD ordering FFP.

## 2024-09-11 NOTE — OP NOTE
Obstetrics & Gynecology Service  Operative Note -  Section    Surgery Date: 2024  Surgeon(s): Marisol Olivier MD  Assistant(s): Irina Javier MD (PGY-3)    Preoperative Diagnosis:  Intrauterine pregnancy at 34w4d  Chronic hypertension  History of fatty liver disease  History of abdominal myomectomy  Advanced maternal age  Tri-tri triplet gestation  Fetal growth restriction of fetus 3  Satisfied parity     Postoperative Diagnosis:         Same as above, s/p below stated procedures  Postpartum hemorrhage     Procedure: Primary low-transverse  section with double layer uterine closure via Pfannenstiel incision, bilateral salpingectomy  Complications: Postpartum hemorrhage      Anesthesia:  Spinal  QBL: 2908 mL  IVF: 1800 mL crystalloid   Blood Products: None  UOP: 60 mL concentrated yellow at end of case  Additional Meds: Pitocin, TXA, hemabate, rectal misoprostol  Antibiotics: Ancef x2 (redosed due to blood loss)  Specimens: Cord gas x3, right fallopian tube, left fallopian tube    Operative Findings:   - Dense subcutaneous scarring with indistinct separation from the underlying fascia.  - Dense rectofascial adhesions.  - Minimal adhesions between bladder and anterior lower uterine segment.  - Baby 1: viable male infant in cephalic presentation born at 9:25 AM on 2024. Clear amniotic fluid. APGARs 5/9 at one and five minutes. Birth weight 1,870g. Arterial cord pH 7.23, base deficit 3.7. Venous cord pH 7.24, base deficit 5.2.  - Baby 2: viable male infant in breech presentation born at 9:26 AM on 2024. Clear amniotic fluid. APGARs 8/9 at one and five minutes. Birth weight 1,980g. Venous cord pH 7.23, base deficit 5.1.  - Baby 3: viable female infant in cephalic presentation born at 9:28 AM on 20[birth year]. Clear amniotic fluid. APGARs 7/8 at one and five minutes. Birth weight 1,780g. Arterial cord pH 7.20, base deficit unable to calculate.   - Placentas delivered intact  at once with fundal massage and gentle cord traction.  - Large uterus with scarring and dimpling along at anterior fundus from prior myomectomy. Normal appearing fallopian tubes and ovaries with small filmy adnexal adhesions. Bilateral fallopian tubes removed with hemostatic pedicles.  - Mild uterine atony initially improved with Pitocin and fundal massage.  - Subcentimeter peritoneal inclusion cyst above the bladder, removed.  - Diffuse oozing from the subcutaneous tissue, bilateral rectus muscles, peritoneal edges, and areas of scarring.  - 3-0 Vicryl thrown around left rectus muscle for tamponade, plus additional figure of X superficially.   - Surgifoam placed and Floseal applied on top of bilateral rectus muscles to ensure hemostasis from raw edges.  - Fascia closed with 0 looped PDS x2 from bilateral corners brought together in the midline.  - Subcutaneous tissue approximated with 3-0 Vicryl.  - Skin reapproximated with 4-0 Monocryl and covered with steri strips.  - Fundal exam at end of case with expression of large clot, ~600 mL with lower uterine segment atony identified for which hemabate and rectal misoprostol were given.     Indications: Murali Norwood is a 44 year old now  who was admitted at 34w4d for scheduled primary  delivery of her trichorionic triamniotic triplets with bilateral salpingectomy. The risks, benefits, and alternatives of  delivery were explained, and the patient agreed to proceed.     Procedure Details:  After obtaining informed consent, the patient was taken to the operating room. She received spinal anesthesia for pain management. A lopez catheter was placed in her bladder using standard sterile technique. She received 2 g Ancef prior to skin incision. She was placed in the dorsal supine position with a leftward tilt and prepped and draped in the usual sterile fashion.     Following test of adequate spinal anesthesia, the abdomen was entered through a  transverse skin incision through her previous scar. The skin incision was made sharply and carried through the subcutaneous tissue to the fascia.  Fascia was incised in the midline and extended laterally with the Pettit scissors.  The superior aspect of the fascia was grasped with the Kocher clamps and dissected off of the underlying rectus muscles with sharp dissection of significant scarring. Attention was then turned to the inferior aspect of the fascia, which was similarly dissected off of the underlying rectus muscles. The peritoneum was entered bluntly, and the opening was extended with sharp dissection. The bladder blade was placed.  The lower segment of the uterus was opened sharply in a transverse fashion and extended with digital pressure. Baby 1 was noted to be in the cephalic position and was delivered atraumatically. The cord was doubly clamped after 30 seconds and cut, and the infant was handed off to the waiting NICU staff. Baby 2 was in transverse position and delivered breech atraumatically using standard breech maneuvers. The cord was doubly clamped after 30 seconds and cut, and the infant was handed off to the waiting NICU staff. Baby 3 was also in transverse position and was delivered atraumatically in cephalic position. The cord was doubly clamped after 30 seconds and cut, and the infant was handed off to the waiting NICU staff. A segment of all three cords was cut and set aside for cord gases. The placentas were delivered intact at one time with gentle traction on the umbilical cord and uterine massage. The uterus was exteriorized from the abdomen and cleared of all clots and debris.  The uterus was massaged and was noted have mild atony. Pitocin was given through the running IV. The hysterotomy was repaired with 0 Vicryl suture in a running locked fashion. A running unlocked layer of 0 Monocryl was used to imbricate the incision, and good hemostasis was achieved. The bladder was inspected and  found to be hemostatic with an overlying peritoneal inclusion cyst, which was removed with cautery and discarded. The posterior cul-de-sac was cleared of large clot burden. Attention was then turned to the second portion of the case. The right fallopian tube was elevated with [place] clamps. Starting at the distal fimbriated end, the mesosalpinx was sequentially clamped, cauterized, and cut using the handheld Ligasure until the cornua was reached. The fallopian tube was cauterized and transected at the cornua with a hemostatic pedicle. Attention was turned to the left side and same process repeated to remove the left fallopian tube. The uterus was returned to the abdomen. The pericolic gutters were cleared of all clots and debris. The hysterotomy was reexamined and noted to be hemostatic. The fascia and rectus muscles were examined with significant areas of oozing which required cautery, a 3-0 Vicryl tamponade stitch around the left rectus, a superficial figure of X stitch of the left rectus, as well as application of bilateral Surgifoam and Floseal on top of the rectus muscles to achieve adequate hemostasis. TXA was given to assist with bleeding given the significant areas of oozing. The fascia was closed with two 0 looped PDS sutures starting from both lateral edges of the fascia and tied together in the midline. The subcutaneous tissue was irrigated, and areas of oozing were controlled with electrocautery. The subcutaneous tissue was closed with 3-0 VIcryl. The skin was closed with 4-0 Monocryl and covered with steri strips. Fundal exam at the end of the case was performed with expression of large clot, ~600 mL with lower uterine segment atony identified for which hemabate and rectal misoprostol were given.    The patient tolerated the procedure well and was taken to the recovery room in stable condition. All sponge, needle and instrument counts were correct x2.    Dr. Olivier was present for the entire  procedure.     Irina Javier MD  Obstetrics & Gynecology, PGY-3  9/11/2024 1:47 PM     Staff:  I was scrubbed and present for entire case and agree w/ above note.    Marisol Olivier MD

## 2024-09-11 NOTE — H&P
[facility]  OB History and Physical   September 10, 2024  Murali Norwood  [id number]    HPI: Murali Norwood is a 44 year old  at 34w4d by patient reported YVONNE who presents for scheduled  section and bilateral salpingectomy.    She states that she is feeling well today.  She denies vaginal bleeding or loss of fluid and is feeling normal fetal movement. She denies headache, vision changes, chest pain, shortness of breath, fever, chills, nausea, vomiting or other systemic complaints. No dysuria, changes in vaginal discharge, or edema in extremities noted.      Pregnancy notable for:  Chronic hypertension, PTA labetalol 200 TID  History of fatty liver disease  History of myomectomy  Advanced maternal age  Tri-tri triplet gestation  Fetal growth restriction of fetus 3 with normal doppler  Satisfied parity, FTP     OB History   OB History    Para Term  AB Living   3 2 2 0 0 2   SAB IAB Ectopic Multiple Live Births   0 0 0 0 2      # Outcome Date GA Lbr Lauro/2nd Weight Sex Type Anes PTL Lv   3 Current            2 Term            1 Term              Past Medical History  Past Medical History:   Diagnosis Date    Anemia     Hypertension      Past Surgical History  Past Surgical History:   Procedure Laterality Date    INTERVENTIONAL RADIOLOGY ADULT/PEDS IP CONSULT       Medications   Prior to Admission medications    Medication Sig Last Dose Taking? Auth Provider Long Term End Date   ferrous sulfate (FEROSUL) 325 (65 Fe) MG tablet Take 325 mg by mouth daily (with breakfast)   Reported, Patient     labetalol (NORMODYNE) 100 MG tablet Take 1 tablet (100 mg) by mouth 3 times daily  Patient taking differently: Take 100 mg by mouth 3 times daily. Pt taking 200 mg TID   Flynn Wayne MD Yes    labetalol (NORMODYNE) 100 MG tablet Take 100 mg by mouth 3 times daily  Patient not taking: Reported on 2024   Reported, Patient Yes     Prenatal Vit-Fe Fumarate-FA (PRENATAL MULTIVITAMIN  PLUS IRON) 27-1 MG TABS Take by mouth daily   Reported, Patient     Prenatal Vit-Fe Fumarate-FA (PRENATAL MULTIVITAMIN W/IRON) 27-0.8 MG tablet Take 1 tablet by mouth daily   Flynn Wayne MD       Allergies  No Known Allergies    Family History  No family history on file.    Social History   Social History     Tobacco Use    Smoking status: Never    Smokeless tobacco: Never   Substance Use Topics    Alcohol use: Not Currently    Drug use: Never     Physical Exam  There were no vitals filed for this visit.  General: alert, oriented female, resting in bed in NAD  CV: regular rate and rhythm  Lungs: normal rate and work of breathing on room air  Abdomen: soft, gravid, non-tender to palpation    FHT: baseline 125 bpm, moderate variability, accelerations present, decelerations absent  FHT: baseline 130 bpm, moderate variability, accelerations present, decelerations absent  FHT: baseline 130 bpm, moderate variability, accelerations present, decelerations absent  [place]: 0 contractions per 10 minutes    Prenatal Labs & Imaging:  Rh positive, antibody negative  Rubella immune, Hepatitis B nonimmune, Varicella immune  Hepatitis B sAg NR, Hepatitis C NR, HIV NR, RPR negative    A/P: 44 year old  at 34w4d who presents for scheduled primary  delivery and bilateral salpingectomy. Pregnancy otherwise notable for tri-tri triplet gestation with FGR of fetus 3, cHTN, AMA, history of myomectomy, history of fatty liver disease. Will admit to Labor & Delivery for surgery as planned.    # History of Fatty Liver  Slightly elevated LFTs (60-70s) on initial baseline labs. Subsequently normal on repeat labs . RUQ ultrasound was normal on 7/10/24.    # History of Myomectomy  Has had two prior myomectomies. One via Pfannenstiel incision, and one laparoscopically. Patient denies history of entry into uterine cavity with the fibroid removals.    # Scheduled Primary   Section  Indicated due to trichorionic triamniotic triplet gestation. Will plan for a Pfannenstiel skin incision with low-transverse hysterotomy. Previous operative notes not available for review.  - Labs: CBC, T&S, RPR  --- Will T&C patient for 1U pRBCs due to high risk for uterine atony  - Placenta: anterior, anterior fundal, posterior  - Anesthesia: Spinal  - Antibiotics: 2g Ancef  - PPH Meds: avoid methergine  - Diet: NPO  - PPx: SCDs  - Consent: Discussed risks and benefits of procedure, including but not limited to bleeding, infection, injury to surrounding organs, injury to infant, and the potential need for another surgery should some injury go unrecognized or patient were to have continued bleeding. Additionally reviewed rare risk of unexpected abnormal placentation or uncontrollable hemorrhage which may require hysterectomy. Patient had time to ask questions and expressed understanding of procedure and associated risks. Agreed to blood transfusion if necessary. Consent signed.     # Satisfied Parity  Risks, benefits, and alternatives of the procedure were discussed with the patient. Risks include but are not limited to bleeding, infection, injury to nearby structures (e.g. bladder, bowel, ureters, nerves, blood vessels), the need for future surgery, increased intraabdominal scar tissue formation, and increased operative time in the setting of a  delivery. The irreversible nature of the surgery and the risk of regret were also addressed. The patient was counseled regarding the small risk of failure, the increased risk of ectopic gestation if pregnancy were to occur, and alternative methods of contraception (e.g. LARC, partner vasectomy). We reviewed the standard procedure of bilateral salpingectomy rather than tubal ligation to reduce lifetime risk of ovarian cancer, and the patient was amenable. However, she was agreeable to tubal ligation in the event of intraabdominal adhesive disease  prohibiting safe salpingectomy.   - She verbalized no future scenario (e.g. perfect partner, unlimited resources, death of living child) where she would desire future pregnancy, and she elected to proceed with surgical sterilization. Written surgical consent was obtained. Federal tubal papers were verified as signed 8/26.    # Tri-tri Triplet Gestation  # FGR, Fetus 3  Last growth US 8/22. Fetus 1: EFW 1700g (22%). Fetus 2: EFW 1794g (34%). Fetus 3: EFW 1405g (3%). Patient declined genetic testing.    # cHTN  - BP: currently normotensive range  -- Continue serial BP monitoring  -- Continue PTA labetalol 200mg TID. Consider initiation/titration of long-acting antihypertensives prn if clinically indicated.  -- IV antihypertensives prn for sustained severe range blood pressures (>160/>110)  - Symptoms: none  - Labs: HELLP labs, UPC ordered  - Daily weights, strict I&Os    # Prenatal Care  - Rh positive, Rubella immune, GBS negative    # Fetal Well Being  Category I FHT, reactive x3.  - Continuous fetal monitoring  - Intrauterine resuscitative measures PRN  - s/p BMZ 9/8-9/9    Patient staffed with Dr. Olivier.    Irina Javier MD  Obstetrics & Gynecology, PGY-3  9/11/2024 7:13 AM     [facility] staff:  Appreciate note by Dr. Javier.  I have seen and examined the patient without the resident. I have reviewed, edited, and agree with the note.      Marisol Olivier MD, FACOG  9/11/2024  8:22 AM

## 2024-09-11 NOTE — PROGRESS NOTES
Brief progress note    S: Norma off of suction and balloon deflated 30 minutes prior. Patient reports no increased bleeding, cramping, discomfort.     O:  Patient Vitals for the past 24 hrs:   BP Temp Temp src Pulse Resp SpO2 Weight   09/11/24 1700 -- -- -- -- -- 94 % --   09/11/24 1630 (!) 129/92 -- -- -- -- 99 % --   09/11/24 1600 -- -- -- -- -- 97 % --   09/11/24 1545 -- -- -- -- -- 99 % --   09/11/24 1530 -- -- -- -- -- 99 % --   09/11/24 1515 124/76 -- -- -- -- 98 % --   09/11/24 1445 110/54 98.2  F (36.8  C) Oral 76 14 97 % --   09/11/24 1435 119/76 -- -- -- -- 95 % --   09/11/24 1415 117/76 98.2  F (36.8  C) Oral 78 (!) 6 96 % --     Gen: NAD, comfortable in bed  Abd: fundus firm 5 cm above the umbilicus  : no bleeding, no gushes of blood with fundal pressure    No bleeding around Norma device, has been off of suction for ~30 minutes. Removed with minimal bleeding. Fundus remains firm.    Urine output: 100 mL over 2 hours - 0.52 ml/kg/hr - adequate for past two hours    A/P:    Patient bleeding has resolved, vitals are stable, no bleeding around Norma device after being taken out. Patient feeling much better, thirst has resolved, and urine output is adequate over the course of 2 hours.   - Continue monitoring bleeding  - [place] in until tomorrow morning to monitor UOp  - Continue mIVF at 125 ml/hr until tomorrow morning  - f/up CBC, coags 2000, 0600    Eric Taylor MD  Obstetrics and Gynecology, PGY-2  09/11/2024 6:13 PM

## 2024-09-11 NOTE — ANESTHESIA POSTPROCEDURE EVALUATION
Patient: Murali Norwood    Procedure: Procedure(s):   SECTION       Anesthesia Type:  Spinal    Note:  Disposition: Inpatient   Postop Pain Control: Uneventful            Sign Out: Well controlled pain   PONV:    Neuro/Psych: Uneventful            Sign Out: Acceptable/Baseline neuro status   Airway/Respiratory: Uneventful            Sign Out: Acceptable/Baseline resp. status   CV/Hemodynamics: Uneventful            Sign Out: Acceptable CV status; No obvious hypovolemia; No obvious fluid overload   Other NRE: NONE   DID A NON-ROUTINE EVENT OCCUR?          CBC RESULTS:   Recent Labs   Lab Test 24  1152 24  1045 24  1046 24  1105   WBC 11.1* 9.0 7.7 7.0   HGB 9.2* 11.9 11.3* 10.4*   HCT 29.2* 36.9 34.8* 32.8*    202 190 246     INR/PT/PTT/FIBR:   Recent Labs   Lab Test 24  1152   INR 1.19*   PTT 33   FIBR 136*      Getting cryo FFP  Patient Vitals for the past 24 hrs:   BP Temp Temp src Pulse Resp SpO2 Weight   24 1445 110/54 36.8  C (98.2  F) Oral 76 14 97 % --   24 1435 119/76 -- -- -- -- 95 % --   24 1415 117/76 36.8  C (98.2  F) Oral 78 (!) 6 96 % --   24 1414 -- -- -- 86 12 94 % --   24 1413 -- -- -- 85 13 96 % --   24 1412 -- -- -- 82 11 96 % --   24 1411 -- -- -- 83 (!) 7 96 % --   24 1410 -- -- -- 89 (!) 9 96 % --   24 1409 -- -- -- 85 16 97 % --   24 1408 -- -- -- 83 12 97 % --   24 1407 -- -- -- 84 14 96 % --   24 1406 -- -- -- 83 24 96 % --   24 1405 -- -- -- 83 12 97 % --   24 1404 -- -- -- 88 (!) 9 97 % --   24 1403 -- -- -- 84 10 98 % --   24 1402 -- -- -- 80 10 95 % --   24 1401 -- -- -- 86 (!) 9 98 % --   24 1400 129/84 -- -- 81 (!) 7 97 % --   24 1359 -- -- -- 85 16 96 % --   24 1358 -- -- -- 83 11 98 % --   24 1357 -- -- -- 77 (!) 9 95 % --   24 1356 -- -- -- 86 11 97 % --   24 1355 -- -- -- 85 (!) 9 98 % --    09/11/24 1354 -- -- -- 87 (!) 9 96 % --   09/11/24 1353 -- -- -- 89 21 96 % --   09/11/24 1352 -- -- -- 80 (!) 8 97 % --   09/11/24 1351 -- -- -- 85 13 97 % --   09/11/24 1350 -- -- -- 84 (!) 0 97 % --   09/11/24 1349 -- -- -- 84 14 97 % --   09/11/24 1348 -- -- -- 81 12 96 % --   09/11/24 1347 -- -- -- 81 11 95 % --   09/11/24 1346 -- -- -- 89 10 96 % --   09/11/24 1345 125/87 36.4  C (97.5  F) Axillary 87 12 98 % --   09/11/24 1340 -- -- -- 85 10 95 % --   09/11/24 1335 -- -- -- 82 15 98 % --   09/11/24 1330 (!) 137/99 -- -- 84 19 98 % --   09/11/24 1325 -- -- -- 80 14 97 % --   09/11/24 1320 -- -- -- 75 14 98 % --   09/11/24 1316 (!) 141/91 -- -- 77 16 95 % --   09/11/24 1315 -- -- -- 83 16 96 % --   09/11/24 1314 (!) 143/97 -- -- 80 10 -- --   09/11/24 1312 -- 36.4  C (97.6  F) -- -- 14 -- --   09/11/24 1310 (!) 143/97 -- -- 73 (!) 7 95 % --   09/11/24 1305 -- -- -- 77 (!) 0 96 % --   09/11/24 1303 (!) 156/79 -- -- 79 (!) 8 98 % --   09/11/24 1300 (!) 177/106 -- -- 74 (!) 8 95 % --   09/11/24 1255 -- -- -- 84 (!) 8 94 % --   09/11/24 1250 -- -- -- 77 13 96 % --   09/11/24 1245 (!) 179/102 -- -- 77 10 95 % --   09/11/24 1240 -- -- -- 82 (!) 8 94 % --   09/11/24 1235 (!) 183/106 -- -- 82 (!) 7 94 % --   09/11/24 1230 (!) 145/110 36.3  C (97.3  F) Axillary 87 10 94 % --   09/11/24 1225 -- -- -- 89 16 96 % --   09/11/24 1220 -- -- -- 73 13 98 % --   09/11/24 1215 (!) 142/113 -- -- 77 12 98 % --   09/11/24 1210 -- -- -- 91 21 94 % --   09/11/24 1205 (!) 126/106 -- -- 86 12 96 % --   09/11/24 1200 (!) 163/143 -- -- 75 21 97 % --   09/11/24 1155 -- -- -- 75 15 99 % --   09/11/24 1150 -- -- -- 78 (!) 6 92 % --   09/11/24 1145 108/84 -- -- -- -- -- 95.3 kg (210 lb)   09/11/24 1120 (!) 140/98 -- -- -- -- -- --   09/11/24 0728 -- 37.1  C (98.7  F) Oral -- -- -- --   09/11/24 0725 -- -- -- -- -- 98 % --   09/11/24 0720 -- -- -- -- -- 97 % --   09/11/24 0715 (!) 149/81 -- -- -- -- 97 % --   09/11/24 0710 -- -- -- -- -- 97  % --         Last vitals:  Vitals:    09/11/24 1415 09/11/24 1435 09/11/24 1445   BP: 117/76 119/76 110/54   Pulse: 78  76   Resp: (!) 6  14   Temp: 36.8  C (98.2  F)  36.8  C (98.2  F)   SpO2: 96% 95% 97%       Electronically Signed By: Chastity Diaz MD  September 11, 2024  3:11 PM

## 2024-09-11 NOTE — ANESTHESIA CARE TRANSFER NOTE
Patient: Murali Norwood    Procedure: Procedure(s):   SECTION       Diagnosis: Trichorionic triamniotic triplet pregnancy in third trimester [O30.133]  Multigravida of advanced maternal age in third trimester [O09.523]  Chronic hypertension affecting pregnancy [O10.919]  Pregnancy related condition, antepartum [O26.90]  Diagnosis Additional Information: No value filed.    Anesthesia Type:   Spinal     Note:    Oropharynx: spontaneously breathing and oropharynx clear of all foreign objects  Level of Consciousness: awake  Oxygen Supplementation: face mask  Level of Supplemental Oxygen (L/min / FiO2): 6  Independent Airway: airway patency satisfactory and stable  Dentition: dentition unchanged  Vital Signs Stable: post-procedure vital signs reviewed and stable  Report to RN Given: handoff report given  Patient transferred to: PACU    Handoff Report: Identifed the Patient, Identified the Reponsible Provider, Reviewed the pertinent medical history, Discussed the surgical course, Reviewed Intra-OP anesthesia mangement and issues during anesthesia, Set expectations for post-procedure period and Allowed opportunity for questions and acknowledgement of understanding      Vitals:  Vitals Value Taken Time   BP     Temp     Pulse     Resp     SpO2         Electronically Signed By: Osito Ritchie MD  2024  11:26 AM

## 2024-09-11 NOTE — PROVIDER NOTIFICATION
09/11/24 1133   Provider Notification   Provider Name/Title Dr Javier   Method of Notification At Bedside   Notification Reason Other     Dr Javier at bedside for evaluation and placement of Norma.

## 2024-09-11 NOTE — ANESTHESIA PROCEDURE NOTES
TAP Procedure Note    Pre-Procedure   Staff -        Anesthesiologist:  Chastity Diaz MD       Resident/Fellow: Osito Shirley MD       Performed By: resident       Location: OB       Procedure Start/Stop Times: 9/11/2024 11:02 AM and 9/11/2024 11:13 AM       Pre-Anesthestic Checklist: patient identified, IV checked, site marked, risks and benefits discussed, informed consent, monitors and equipment checked, pre-op evaluation, at physician/surgeon's request and post-op pain management  Timeout:       Correct Patient: Yes        Correct Procedure: Yes        Correct Site: Yes        Correct Position: Yes        Correct Laterality: Yes        Site Marked: Yes  Procedure Documentation  Procedure: TAP       Diagnosis: POST OPERATIVE PAIN       Laterality: bilateral       Patient Position: supine       Skin prep: Chloraprep       Insertion Site: T9-10, T8-9.       Needle Type: short bevel       Needle Gauge: 21.        Needle Length (millimeters): 110        Ultrasound guided       1. Ultrasound was used to identify targeted nerve, plexus, vascular marker, or fascial plane and place a needle adjacent to it in real-time.       2. Ultrasound was used to visualize the spread of anesthetic in close proximity to the above referenced structure.       3. A permanent image is entered into the patient's record.    Assessment/Narrative         The placement was negative for: blood aspirated, painful injection and site bleeding       Paresthesias: No.       Bolus given via needle. no blood aspirated via catheter.        Secured via.        Insertion/Infusion Method: Single Shot       Complications: none       Injection made incrementally with aspirations every 5 mL.    Medication(s) Administered   Bupivacaine 0.25% PF (Infiltration) - Infiltration   20 mL - 9/11/2024 11:10:00 AM  Bupivacaine liposome (Exparel) 1.3% LA inj susp (Infiltration) - Infiltration   20 mL - 9/11/2024 11:10:00 AM  Medication Administration Time:  "9/11/2024 11:02 AM      FOR [facility] ([facility]) ONLY:   Pain Team Contact information: please page the Pain Team Via Tamr. Search \"Pain\". During daytime hours, please page the attending first. At night please page the resident first.      "

## 2024-09-11 NOTE — PROVIDER NOTIFICATION
09/11/24 1123   Provider Notification   Provider Name/Title Dr Javier   Method of Notification Electronic Page   Request Evaluate in Person   Notification Reason Other     Patient had multiple clots with fundal check, please come assess.

## 2024-09-11 NOTE — BRIEF OP NOTE
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 [O30.133]  Multigravida of advanced maternal age in third trimester [O09.523]  Chronic hypertension affecting pregnancy [O10.919]  Pregnancy related condition, antepartum [O26.90]  Post-operative diagnosis Same as pre-operative diagnosis      PPH    Procedure:  SECTION, N/A - Abdomen    Surgeon: Surgeons and Role:     * Marisol Olivier MD - Primary     * Irina Javier MD - Resident - Assisting  Anesthesia: Spinal   Estimated Blood Loss: 3L  IVF: 1800 mL  Meds: TXA, hemabate, WI misoprostol  UOP: 60 mL    Drains: Mclaughlin  Specimens:   ID Type Source Tests Collected by Time Destination   A : Baby A Cord blood Umbilical Cord OR DOCUMENTATION ONLY Marisol Olivier MD 2024  9:23 AM    B : Baby B Cord blood Umbilical Cord OR DOCUMENTATION ONLY Marisol Olivier MD 2024  9:24 AM    C : Baby C Cord blood Umbilical Cord OR DOCUMENTATION ONLY Marisol Olivier MD 2024  9:24 AM    D : Baby A Tissue Umbilical Cord SEE PROVIDERS ORDERS Marisol Olivier MD 2024  9:25 AM    E :  Tissue Fallopian Tube, Right SEE PROVIDERS ORDERS Marisol Olivier MD 2024  9:42 AM    F :  Tissue Fallopian Tube, Left SEE PROVIDERS ORDERS Marisol Olivier MD 2024  9:43 AM    G : Baby B Tissue Umbilical Cord SEE PROVIDERS ORDERS Marisol Olivier MD 2024  9:52 AM    H : Baby C Tissue Umbilical Cord SEE PROVIDERS ORDERS Marisol Olivier MD 2024  9:53 AM    I : Baby A Cord blood, arterial Umbilical Cord OR DOCUMENTATION ONLY Marisol Olivier MD 2024 10:01 AM    J : Baby A Cord blood, venous Umbilical Cord OR DOCUMENTATION ONLY Marisol Olivier MD 2024 10:04 AM    K : Baby B Cord blood, arterial Umbilical Cord OR DOCUMENTATION ONLY Marisol Olivier  MD Alva 9/11/2024 10:04 AM    L : Baby B Cord blood, venous Umbilical Cord OR DOCUMENTATION ONLY Marisol Olivier MD 9/11/2024 10:04 AM    M : Baby C Cord blood, arterial Umbilical Cord OR DOCUMENTATION ONLY Marisol Olivier MD 9/11/2024 10:04 AM    N : Baby C Cord blood, venous Umbilical Cord OR DOCUMENTATION ONLY Marisol Olivier MD 9/11/2024 10:05 AM    O :  Placenta Placenta SEE PROVIDERS ORDERS Marisol Olivier MD 9/11/2024 10:13 AM      Findings:   Anterior fundus with old scarring from prior myomectomy. Baby A cephalic. Baby #2 transverse> ness breech. Baby #3 transverse>cephalic.  Cord segments collected.  Placentas removed intact at same time.    Mild atony improved with Pit and methergine.  Hysterotomy closed in 2 layers.  Bilateral fallopian tubes removed entirely. Small adnexal adhesions. Pedicles hemostatic.  Peritoneal inclusion cyst above the bladder, removed.  Diffuse oozing from subQ, rectus muscles, peritoneal edges.  Surgifoam placed at bilateral corners of rectus muscles  3-0 Vicryl thrown around left rectus muscle for tamponade, plus additional figure of X superficially.   Floseal applied to bilateral rectus muscles.  Fascia closed with 0 looped PDS x2.   SubQ approximated with 3-0 Vicryl  Skin with 4-0 monocryl.  Steri strips.    Rectal misoprostol placed at end. Expression of 600mL of clot from uterus prior to this. Hemabate given.    Ancef redosed given blood loss.    Complications: None.  Implants: * No implants in log *    STAT coags. HDS in the OR. No vasopressors needed.    Irina Javier MD  Obstetrics & Gynecology, PGY-3  9/11/2024 11:13 AM

## 2024-09-11 NOTE — PROVIDER NOTIFICATION
09/11/24 1500   Provider Notification   Provider Name/Title Dr. David Walsh   Method of Notification In Department   Request Evaluate-Remote   Notification Reason Status Update     RN updating MD on patient urine output of 75 mL since getting to PACU. IV fluids running as ordered. No new orders/interventions at this time. MD states plan continues to be to deflate and remove Norma from suction at about 1600 and will evaluate bleeding closely following. Bleeding stable at this time, scant, fundus firm. Pt VSS.

## 2024-09-11 NOTE — PROVIDER NOTIFICATION
09/11/24 1635   Provider Notification   Provider Name/Title Dr. Taylor   Method of Notification At Bedside   Request Evaluate in Person   Notification Reason Status Update     Dr. Taylor to bedside to reassess bleeding following Norma deflation. Scant bleeding noted. MD removed Norma. IV bolus finished, LR continues at 125 ml/hr.

## 2024-09-11 NOTE — PLAN OF CARE
Patient arrived at 0622 for scheduled  section for tri-tri triplets gestation. Pt placed on continuous EFM monitoring and IV was placed. Report given to DANIEL Rodriguez.

## 2024-09-11 NOTE — PROVIDER NOTIFICATION
09/11/24 1600   Provider Notification   Provider Name/Title Dr. Taylor   Method of Notification At Bedside;In Department   Request Evaluate in Person   Notification Reason Status Update     Dr. Taylor to bedside to evaluate patient bleeding and assess deflation/removal of suction for Norma. No new bleeding noted. Norma deflated and removed from suction. MD also ordered 500 ml LR bolus, started.

## 2024-09-11 NOTE — ANESTHESIA PROCEDURE NOTES
"Intrathecal injection Procedure Note    Pre-Procedure   Staff -        Anesthesiologist:  Chastity Diaz MD       Resident/Fellow: Osito Shirley MD       Performed By: resident       Location: OR       Pre-Anesthestic Checklist: patient identified, IV checked, risks and benefits discussed, informed consent, monitors and equipment checked, pre-op evaluation, at physician/surgeon's request and post-op pain management  Timeout:       Correct Patient: Yes        Correct Procedure: Yes        Correct Site: Yes        Correct Position: Yes   Procedure Documentation  Procedure: intrathecal injection       Patient Position: sitting       Skin prep: Chloraprep       Insertion Site: L3-4. (midline approach).       Needle Gauge: 25.        Needle Length (Inches): 3.5        Spinal Needle Type: Pencan       Introducer used       Introducer: 20 G       # of attempts: 1 and  # of redirects:  2    Assessment/Narrative         Paresthesias: No.       CSF fluid: clear.       Opening pressure was cmH2O while  Sitting.      Medication(s) Administered   0.75% Hyperbaric Bupivacaine (Intrathecal) - Intrathecal   1.6 mL - 9/11/2024 8:51:00 AM  Fentanyl PF (Intrathecal) - Intrathecal   15 mcg - 9/11/2024 8:51:00 AM  Morphine PF 1 mg/mL (Intrathecal) - Intrathecal   0.15 mg - 9/11/2024 8:51:00 AM    FOR [facility] ([facility]) ONLY:   Pain Team Contact information: please page the Pain Team Via [employer]ixAlert. Search \"Pain\". During daytime hours, please page the attending first. At night please page the resident first.      "

## 2024-09-11 NOTE — PROVIDER NOTIFICATION
09/11/24 1245   Provider Notification   Provider Name/Title Dr. Taylor   Method of Notification At Bedside   Request Evaluate in Person   Notification Reason Status Update     MD Olivier and Claudia to bedside to evaluate patient status. MD Olivier discussing blood loss with patient, and explaining that she will get further blood products-FFP. Fundus/bleeding checked with jeferson PHELPS. See flowsheets for assessment/documentation. Patient also with severe range BP. Giving PO labetolol and per MD will monitor need for IV medication/magnesium.     Update: BP stabilized following PO labetolol, no further interventions required.

## 2024-09-11 NOTE — PROGRESS NOTES
Brief progress note    S: Hand-off given by Dr. Javier. Presented to bedside to evaluate patient. Norma in place, patient is shivering but comfortable. Discussed the implications of elevated EBL and transfusion, elevated blood pressure, and uterine atony in the immediate postpartum period.     O:   Patient Vitals for the past 24 hrs:   BP Temp Temp src Pulse Resp SpO2 Weight   09/11/24 1350 -- -- -- 84 (!) 0 97 % --   09/11/24 1345 125/87 97.5  F (36.4  C) Axillary 87 12 98 % --   09/11/24 1340 -- -- -- 85 10 95 % --   09/11/24 1335 -- -- -- 82 15 98 % --   09/11/24 1330 (!) 137/99 -- -- 84 19 98 % --   09/11/24 1325 -- -- -- 80 14 97 % --   09/11/24 1320 -- -- -- 75 14 98 % --   09/11/24 1316 (!) 141/91 -- -- 77 16 95 % --   09/11/24 1315 -- -- -- 83 16 96 % --   09/11/24 1314 (!) 143/97 -- -- 80 10 -- --   09/11/24 1312 -- 97.6  F (36.4  C) -- -- 14 -- --   09/11/24 1310 (!) 143/97 -- -- 73 (!) 7 95 % --   09/11/24 1305 -- -- -- 77 (!) 0 96 % --   09/11/24 1303 (!) 156/79 -- -- 79 (!) 8 98 % --   09/11/24 1300 (!) 177/106 -- -- 74 (!) 8 95 % --   09/11/24 1255 -- -- -- 84 (!) 8 94 % --   09/11/24 1250 -- -- -- 77 13 96 % --   09/11/24 1245 (!) 179/102 -- -- 77 10 95 % --   09/11/24 1240 -- -- -- 82 (!) 8 94 % --   09/11/24 1235 (!) 183/106 -- -- 82 (!) 7 94 % --   09/11/24 1230 (!) 145/110 97.3  F (36.3  C) Axillary 87 10 94 % --   09/11/24 1225 -- -- -- 89 16 96 % --   09/11/24 1220 -- -- -- 73 13 98 % --   09/11/24 1215 (!) 142/113 -- -- 77 12 98 % --   09/11/24 1210 -- -- -- 91 21 94 % --   09/11/24 1205 (!) 126/106 -- -- 86 12 96 % --   09/11/24 1200 (!) 163/143 -- -- 75 21 97 % --   09/11/24 1155 -- -- -- 75 15 99 % --   09/11/24 1150 -- -- -- 78 (!) 6 92 % --   09/11/24 1145 108/84 -- -- -- -- -- 95.3 kg (210 lb)   09/11/24 1120 (!) 140/98 -- -- -- -- -- --   09/11/24 0728 -- 98.7  F (37.1  C) Oral -- -- -- --   09/11/24 0725 -- -- -- -- -- 98 % --   09/11/24 0720 -- -- -- -- -- 97 % --   09/11/24 0715 (!)  149/81 -- -- -- -- 97 % --   09/11/24 0710 -- -- -- -- -- 97 % --   Gen: NAD  Abd: fundus 7cm above the umbilicus, firm, appropriately tender  : Norma suction device in place with 75 mL in the canister, no gush of fluid with fundal check    A/P:    #cHTN  The patient was shaking and her BP cuff was moved to her calf where she had multiple SSR BP. Once the patient stopped shaking, the cuff was moved back to her arm, and her BP were MR but not SR. Since the patient only had one NSSR BP with the cuff on her arm, she is not being diagnosed with SI pre-eclampsia w/ SF. If she has another SR on her arm in >4 hours, or she has SSR pressures, she will be considered SI pre-e w/ SF and started on magnesium.    #BL, postpartum hemorrhage, coagulopathy  The patient has 3.8L of blood loss and has a norma suction device in place. There is ~75 mL of blood in the canister; however it is not rapidly increasing. She has received 1u pRBC, and will receive 1u FFP/1u cryo. Will continue to monitor vitals.    Eric Taylor MD  Obstetrics and Gynecology, PGY-2  09/11/2024 4:14 PM     Note entry delayed due to urgent patient care needs

## 2024-09-12 LAB
ALBUMIN SERPL BCG-MCNC: 2.5 G/DL (ref 3.5–5.2)
ALP SERPL-CCNC: 141 U/L (ref 40–150)
ALT SERPL W P-5'-P-CCNC: 14 U/L (ref 0–50)
APTT PPP: 30 SECONDS (ref 22–38)
AST SERPL W P-5'-P-CCNC: 47 U/L (ref 0–45)
BILIRUB DIRECT SERPL-MCNC: <0.2 MG/DL (ref 0–0.3)
BILIRUB SERPL-MCNC: 0.9 MG/DL
BLD PROD TYP BPU: NORMAL
BLOOD COMPONENT TYPE: NORMAL
CODING SYSTEM: NORMAL
CREAT SERPL-MCNC: 0.74 MG/DL (ref 0.51–0.95)
CROSSMATCH: NORMAL
EGFRCR SERPLBLD CKD-EPI 2021: >90 ML/MIN/1.73M2
ERYTHROCYTE [DISTWIDTH] IN BLOOD BY AUTOMATED COUNT: 15.3 % (ref 10–15)
ERYTHROCYTE [DISTWIDTH] IN BLOOD BY AUTOMATED COUNT: 16.1 % (ref 10–15)
FIBRINOGEN PPP-MCNC: 209 MG/DL (ref 170–510)
HCT VFR BLD AUTO: 19.5 % (ref 35–47)
HCT VFR BLD AUTO: 22.3 % (ref 35–47)
HGB BLD-MCNC: 6.3 G/DL (ref 11.7–15.7)
HGB BLD-MCNC: 7.3 G/DL (ref 11.7–15.7)
INR PPP: 1.05 (ref 0.85–1.15)
ISSUE DATE AND TIME: NORMAL
MCH RBC QN AUTO: 30.7 PG (ref 26.5–33)
MCH RBC QN AUTO: 30.9 PG (ref 26.5–33)
MCHC RBC AUTO-ENTMCNC: 32.3 G/DL (ref 31.5–36.5)
MCHC RBC AUTO-ENTMCNC: 32.7 G/DL (ref 31.5–36.5)
MCV RBC AUTO: 95 FL (ref 78–100)
MCV RBC AUTO: 95 FL (ref 78–100)
PLATELET # BLD AUTO: 120 10E3/UL (ref 150–450)
PLATELET # BLD AUTO: 140 10E3/UL (ref 150–450)
PROT SERPL-MCNC: 4.3 G/DL (ref 6.4–8.3)
RBC # BLD AUTO: 2.05 10E6/UL (ref 3.8–5.2)
RBC # BLD AUTO: 2.36 10E6/UL (ref 3.8–5.2)
UNIT ABO/RH: NORMAL
UNIT NUMBER: NORMAL
UNIT STATUS: NORMAL
UNIT TYPE ISBT: 5100
WBC # BLD AUTO: 12.8 10E3/UL (ref 4–11)
WBC # BLD AUTO: 13 10E3/UL (ref 4–11)

## 2024-09-12 PROCEDURE — 250N000013 HC RX MED GY IP 250 OP 250 PS 637

## 2024-09-12 PROCEDURE — 250N000013 HC RX MED GY IP 250 OP 250 PS 637: Performed by: STUDENT IN AN ORGANIZED HEALTH CARE EDUCATION/TRAINING PROGRAM

## 2024-09-12 PROCEDURE — 99232 SBSQ HOSP IP/OBS MODERATE 35: CPT | Mod: GC | Performed by: STUDENT IN AN ORGANIZED HEALTH CARE EDUCATION/TRAINING PROGRAM

## 2024-09-12 PROCEDURE — 36415 COLL VENOUS BLD VENIPUNCTURE: CPT

## 2024-09-12 PROCEDURE — 85027 COMPLETE CBC AUTOMATED: CPT

## 2024-09-12 PROCEDURE — 85610 PROTHROMBIN TIME: CPT

## 2024-09-12 PROCEDURE — 82040 ASSAY OF SERUM ALBUMIN: CPT

## 2024-09-12 PROCEDURE — 120N000002 HC R&B MED SURG/OB UMMC

## 2024-09-12 PROCEDURE — P9016 RBC LEUKOCYTES REDUCED: HCPCS | Performed by: STUDENT IN AN ORGANIZED HEALTH CARE EDUCATION/TRAINING PROGRAM

## 2024-09-12 PROCEDURE — 85384 FIBRINOGEN ACTIVITY: CPT

## 2024-09-12 PROCEDURE — 85730 THROMBOPLASTIN TIME PARTIAL: CPT

## 2024-09-12 PROCEDURE — 82565 ASSAY OF CREATININE: CPT

## 2024-09-12 RX ORDER — ENOXAPARIN SODIUM 100 MG/ML
40 INJECTION SUBCUTANEOUS EVERY 24 HOURS
Status: DISCONTINUED | OUTPATIENT
Start: 2024-09-13 | End: 2024-09-13 | Stop reason: HOSPADM

## 2024-09-12 RX ORDER — DIPHENHYDRAMINE HCL 25 MG
25-50 CAPSULE ORAL EVERY 6 HOURS PRN
Status: DISCONTINUED | OUTPATIENT
Start: 2024-09-12 | End: 2024-09-13 | Stop reason: HOSPADM

## 2024-09-12 RX ORDER — SODIUM CHLORIDE 9 MG/ML
INJECTION, SOLUTION INTRAVENOUS
Status: COMPLETED
Start: 2024-09-12 | End: 2024-09-12

## 2024-09-12 RX ORDER — CYCLOBENZAPRINE HCL 10 MG
10 TABLET ORAL 3 TIMES DAILY PRN
Status: DISCONTINUED | OUTPATIENT
Start: 2024-09-12 | End: 2024-09-13 | Stop reason: HOSPADM

## 2024-09-12 RX ORDER — LIDOCAINE 4 G/G
2 PATCH TOPICAL
Status: DISCONTINUED | OUTPATIENT
Start: 2024-09-12 | End: 2024-09-13 | Stop reason: HOSPADM

## 2024-09-12 RX ADMIN — IBUPROFEN 800 MG: 800 TABLET, FILM COATED ORAL at 22:12

## 2024-09-12 RX ADMIN — ACETAMINOPHEN 975 MG: 325 TABLET ORAL at 09:50

## 2024-09-12 RX ADMIN — LABETALOL HYDROCHLORIDE 200 MG: 200 TABLET, FILM COATED ORAL at 15:39

## 2024-09-12 RX ADMIN — SIMETHICONE 80 MG: 80 TABLET, CHEWABLE ORAL at 07:57

## 2024-09-12 RX ADMIN — OXYCODONE HYDROCHLORIDE 5 MG: 5 TABLET ORAL at 11:59

## 2024-09-12 RX ADMIN — DIPHENHYDRAMINE HYDROCHLORIDE 50 MG: 25 CAPSULE ORAL at 00:36

## 2024-09-12 RX ADMIN — SIMETHICONE 80 MG: 80 TABLET, CHEWABLE ORAL at 12:00

## 2024-09-12 RX ADMIN — ACETAMINOPHEN 975 MG: 325 TABLET ORAL at 15:40

## 2024-09-12 RX ADMIN — IBUPROFEN 800 MG: 800 TABLET, FILM COATED ORAL at 16:17

## 2024-09-12 RX ADMIN — SENNOSIDES AND DOCUSATE SODIUM 2 TABLET: 50; 8.6 TABLET ORAL at 22:11

## 2024-09-12 RX ADMIN — ACETAMINOPHEN 975 MG: 325 TABLET ORAL at 22:10

## 2024-09-12 RX ADMIN — OXYCODONE HYDROCHLORIDE 5 MG: 5 TABLET ORAL at 01:44

## 2024-09-12 RX ADMIN — CYCLOBENZAPRINE 10 MG: 10 TABLET, FILM COATED ORAL at 15:40

## 2024-09-12 RX ADMIN — SIMETHICONE 80 MG: 80 TABLET, CHEWABLE ORAL at 00:56

## 2024-09-12 RX ADMIN — SENNOSIDES AND DOCUSATE SODIUM 2 TABLET: 50; 8.6 TABLET ORAL at 07:46

## 2024-09-12 RX ADMIN — LIDOCAINE 2 PATCH: 4 PATCH TOPICAL at 00:49

## 2024-09-12 RX ADMIN — ACETAMINOPHEN 975 MG: 325 TABLET ORAL at 03:45

## 2024-09-12 RX ADMIN — OXYCODONE HYDROCHLORIDE 5 MG: 5 TABLET ORAL at 06:13

## 2024-09-12 RX ADMIN — LABETALOL HYDROCHLORIDE 200 MG: 200 TABLET, FILM COATED ORAL at 22:12

## 2024-09-12 RX ADMIN — CYCLOBENZAPRINE 10 MG: 10 TABLET, FILM COATED ORAL at 22:10

## 2024-09-12 RX ADMIN — CYCLOBENZAPRINE 10 MG: 10 TABLET, FILM COATED ORAL at 09:50

## 2024-09-12 RX ADMIN — LABETALOL HYDROCHLORIDE 200 MG: 200 TABLET, FILM COATED ORAL at 07:47

## 2024-09-12 ASSESSMENT — ACTIVITIES OF DAILY LIVING (ADL)
ADLS_ACUITY_SCORE: 25
ADLS_ACUITY_SCORE: 25
ADLS_ACUITY_SCORE: 20
ADLS_ACUITY_SCORE: 20
ADLS_ACUITY_SCORE: 25
ADLS_ACUITY_SCORE: 20
ADLS_ACUITY_SCORE: 25
ADLS_ACUITY_SCORE: 20
ADLS_ACUITY_SCORE: 25
ADLS_ACUITY_SCORE: 20
ADLS_ACUITY_SCORE: 25

## 2024-09-12 NOTE — PROGRESS NOTES
Patient arrived to [facility] unit via wheelchair at 2045 ,with belongings, accompanied by spouse. Got report from DANIEL Johnson and checked bands. Unit and room orientation complete. All questions and concerns addressed at this time. Continue with education and plan of care.

## 2024-09-12 NOTE — LACTATION NOTE
"This note was copied from a baby's chart.  Lactation Admission Note      Baby Information:  Infant's first names:    A= Fausto (BB)  B= Eloy (BB)  C= Nimisha (BG)  Infant medical history: 34+4 triplets     needed? No    Lactation goal (if known): Breastfeed, 2 months \"or longer if I can, I also plan to use formula because I can't make enough milk for 3 babies!\"  Lactation history: Has a 20 and 17 year old; they nursed for 2-3 months each    Mother's Information: Name: Misbah  Occupation:    Age: 44  Delivery type:     [place]: [employer]  Pump for home use: recommend [employer]    Partner's name: Anthony Rogel  Occupation:      Relevant maternal medical and social hx:       Information for the patient's mother:  Misbah Norwood [[id number]]     Past Medical History:   Diagnosis Date    Anemia     Hypertension     ,   Information for the patient's mother:  Misbah Norwood [[id number]]     Patient Active Problem List   Diagnosis    Dizziness    Epigastric abdominal pain    Flank pain    Triplet pregnancy, trichorionic/triamniotic, third trimester    AMA (advanced maternal age) multigravida 35+    Late prenatal care    Chronic hypertension    H/O: myomectomy    S/P  section    , and   Information for the patient's mother:  Misbah Norwood [[id number]]     Medications Prior to Admission   Medication Sig Dispense Refill Last Dose    ferrous sulfate (FEROSUL) 325 (65 Fe) MG tablet Take 325 mg by mouth daily (with breakfast)   9/10/2024    labetalol (NORMODYNE) 100 MG tablet Take 1 tablet (100 mg) by mouth 3 times daily (Patient taking differently: Take 100 mg by mouth 3 times daily. Pt taking 200 mg TID) 90 tablet 1 9/10/2024    labetalol (NORMODYNE) 100 MG tablet Take 100 mg by mouth 3 times daily.   Unknown    Prenatal Vit-Fe Fumarate-FA (PRENATAL MULTIVITAMIN  PLUS IRON) 27-1 MG TABS Take by mouth daily   Unknown    Prenatal Vit-Fe Fumarate-FA (PRENATAL MULTIVITAMIN W/IRON) 27-0.8 MG tablet " Take 1 tablet by mouth daily 90 tablet 3 9/10/2024          Relevant maternal medications:   Labetalol    Maternal risk factors:  Lack of breast changes in pregnancy/ concern for IGT:  mom stated her breasts shrunk in pregnancy  Hypertension (details) chronic  Age 44  Surgical birth     Admission Education given:  [x]Admission packet  [x]Kangaroo care  [x]Benefits of breast milk  [x]How breast milk is made  [x]Stages of milk production  [x]Milk supply/ goal volumes  [x]Hand expression  [x]Hands-on pumping  [x]Collecting, labeling, transporting milk  [x]Cleaning, sanitizing pump parts  [x]Storage of milk  [x]Benefits and use of pasteurized donor human milk    I helped her with hand expression and she got a few gtts.  We discussed that some moms do make enough for 3 babies, that even if she doesn't cover 100% any amount MBM has a lot of health benefits, and we will help her maximize her supply along the way.    Rama Alan, RNC-VIVIAN, IBCLC   Lactation Consultant  [place]: [facility] [phone]  Office: [phone]

## 2024-09-12 NOTE — PROGRESS NOTES
"    Anesthesia Brief Post-Partum Follow-Up Note After  Section with Spinal       Patient: Murali Norwood    Patient location: Post-partum floor.    Anesthesia type: Spinal    Subjective:     She does not complain of pruritis at this time. She denies difficulties breathing or voiding, denies nausea or vomiting, and denies headache. The patient is ambulating without weakness or paresthesias. She is tolerating a regular diet.    Objective:    Respiratory Function (RR / SpO2 / Airway Patency): Satisfactory    Cardiac Function (HR / Rhythm / BP): Satisfactory    Strength and sensation lower extremities: Normal    Site of epidural insertion: Skin clean, dry, non tender. No signs of infection or inflammation.     Last Vitals: /76 (BP Location: Right arm)   Pulse 90   Temp 37  C (98.6  F) (Oral)   Resp 20   Ht 1.575 m (5' 2\")   Wt 95.2 kg (209 lb 14.1 oz)   SpO2 99%   Breastfeeding Unknown   BMI 38.39 kg/m      Assessment and plan:   Murali Norwood is a 44 year old female  PPD #1 s/p  section with injection of intrathecal local anesthetic and opioid. This successfully provided surgical anesthesia for delivery of triplets after which the patient received single shot TAP nerve block injections for postoperative analgesia.      There is no evidence of adverse side effects associated with spinal or nerve block injections. If the patient develops new lower extremity paresis or paresthesias; or if there are concerns regarding either injection sites, please reach out to the Dept of Anesthesiology.    Thank you for including us in the care of this patient.    Delicia Carcamo MD  Anesthesiology PGY-4   "

## 2024-09-12 NOTE — PLAN OF CARE
Pt had scheduled c/s for triplets, see operative note by MD Javier. See previous notes and flowsheets regarding patient bleeding and interventions/outcomes. Patient stable at this time, bleeding scant and fundus firm following 1 unit PRBC, FFP and cryoprecipitate. Pain well controlled. Given hot packs and abdominal binder to promote comfort, given PRN tylenol and oxycodone-see MAR. IV fluids @ 125 ml/hr. Mclaughlin patent. Watching urine output closely, is adequate and lightening in color-see I/o flowsheet. Mclaughlin to remain until tomorrow. Given nubain for itching. Pt BP stable-see note by MD Taylor, no further severe range BP since taking PO labetolol. Pt to have labs at 2000 and 0600. Report given to Elizabeth SLAUGHTER RN who will take pt to NICU and postpartum.

## 2024-09-12 NOTE — PLAN OF CARE
Postpartum assessments within normal limits. Blood pressures have been within normal limits. Pt denies headache, vision changes, SOB, RUQ pain. Reflexes 2+ average, no clonus, 2+ edema in left and right ankle. Incision is covered with dry gauze, no drainage. Up ad devante, lopez catheter removed at 0630, due to void.  Patient can walk to and from the bathroom with steady gait. Reports good pain management with PO medications, Pt was able to rest comfortably. Planning to visit NICU. Support person is FOB, not present at this time. Plan of care ongoing, no concerns as of present.      Goal Outcome Evaluation:  Problem: Hypertensive Disorders in Pregnancy  Goal: Patient-Fetal Stabilization  Outcome: Progressing     Problem: Adult Inpatient Plan of Care  Goal: Optimal Comfort and Wellbeing  Intervention: Monitor Pain and Promote Comfort  Recent Flowsheet Documentation  Taken 2024 0345 by Janet Lilly RN  Pain Management Interventions:   heat applied   medication (see MAR)  Taken 2024 0144 by Janet Lilly RN  Pain Management Interventions:   heat applied   medication (see MAR)  Taken 2024 0018 by Janet Llily RN  Pain Management Interventions: heat applied  Taken 2024 2120 by Janet Lilly RN  Pain Management Interventions: medication (see MAR)     Problem: Postpartum ( Delivery)  Goal: Effective Urinary Elimination  2024 0659 by Janet Lilly, RN  Outcome: Progressing  2024 0654 by Janet Lilly RN  Outcome: Progressing

## 2024-09-12 NOTE — PROVIDER NOTIFICATION
09/11/24 2138   Provider Notification   Provider Name/Title Emerald Mayfield G2   Method of Notification Phone   Request Evaluate-Remote   Notification Reason Medication Request     Pt. Is requesting oral benadryl for itchy skin. Thank you

## 2024-09-12 NOTE — PLAN OF CARE
Data: Murali Norwood transferred to [facility] via wheelchair at 2045.   Action: Receiving unit notified of transfer: Yes. Patient and family notified of room change. Report given to Janet at 2008. Belongings sent to receiving unit. Accompanied by Registered Nurse. Oriented patient to surroundings. Call light within reach.   Response: Patient tolerated transfer and is stable.

## 2024-09-12 NOTE — PROGRESS NOTES
OB Postpartum Progress Note    S: Feeling well this morning. Pain well controlled. Lochia improving. Tolerating regular diet without nausea or emesis. Passing flatus, no BM. Ambulating without dizziness or lightheadedness. Mclaughlin removed this morning; not yet spontaneously voiding. Pumping with plans to breast feed and formula feed babies in NICU.    No headache, vision changes, CP, SOB, RUQ pain, fevers, chills.    O:  Patient Vitals for the past 24 hrs:   BP Temp Temp src Pulse Resp SpO2 Height Weight BMI (Calculated) Oximeter Heart Rate   09/12/24 0733 127/86 98.5  F (36.9  C) Oral 83 16 -- -- -- -- --   09/12/24 0629 -- -- -- -- -- -- -- 95.2 kg (209 lb 14.1 oz) -- --   09/12/24 0434 105/71 97.9  F (36.6  C) Oral 79 18 -- -- -- -- --   09/12/24 0018 120/74 97.9  F (36.6  C) Oral 70 14 -- -- -- -- --   09/11/24 2054 (!) 137/91 98.2  F (36.8  C) Oral 68 14 -- -- -- -- --   09/11/24 1953 (!) 143/81 -- -- -- -- -- -- -- -- 74 bpm   09/11/24 1900 -- -- -- -- -- 99 % -- -- -- 70 bpm   09/11/24 1845 133/83 -- -- -- -- 98 % -- -- -- 76 bpm   09/11/24 1830 -- -- -- -- -- 100 % -- -- -- 79 bpm   09/11/24 1815 -- -- -- -- -- 98 % -- -- -- 80 bpm   09/11/24 1800 -- -- -- -- -- 98 % -- -- -- 73 bpm   09/11/24 1745 132/75 -- -- -- -- 98 % -- -- -- 86 bpm   09/11/24 1730 -- -- -- -- -- 94 % -- -- -- 74 bpm   09/11/24 1715 -- -- -- -- -- 95 % -- -- -- 67 bpm   09/11/24 1700 -- -- -- -- -- 94 % -- -- -- 71 bpm   09/11/24 1645 -- -- -- -- -- 98 % -- -- -- 77 bpm   09/11/24 1630 (!) 129/92 98.3  F (36.8  C) Oral -- -- 99 % -- -- -- 81 bpm   09/11/24 1615 -- -- -- -- -- 98 % -- -- -- 85 bpm   09/11/24 1600 -- -- -- -- -- 97 % -- -- -- 87 bpm   09/11/24 1545 -- -- -- -- -- 99 % -- -- -- 77 bpm   09/11/24 1530 -- -- -- -- -- 99 % -- -- -- 76 bpm   09/11/24 1515 124/76 -- -- -- -- 98 % -- -- -- 89 bpm   09/11/24 1445 110/54 98.2  F (36.8  C) Oral 76 14 97 % -- -- -- --   09/11/24 1435 119/76 -- -- -- -- 95 % -- -- -- --   09/11/24 1415  117/76 98.2  F (36.8  C) Oral 78 (!) 6 96 % -- -- -- 82 bpm   09/11/24 1414 -- -- -- 86 12 94 % -- -- -- --   09/11/24 1413 -- -- -- 85 13 96 % -- -- -- --   09/11/24 1412 -- -- -- 82 11 96 % -- -- -- --   09/11/24 1411 -- -- -- 83 (!) 7 96 % -- -- -- --   09/11/24 1410 -- -- -- 89 (!) 9 96 % -- -- -- --   09/11/24 1409 -- -- -- 85 16 97 % -- -- -- --   09/11/24 1408 -- -- -- 83 12 97 % -- -- -- --   09/11/24 1407 -- -- -- 84 14 96 % -- -- -- --   09/11/24 1406 -- -- -- 83 24 96 % -- -- -- --   09/11/24 1405 -- -- -- 83 12 97 % -- -- -- --   09/11/24 1404 -- -- -- 88 (!) 9 97 % -- -- -- --   09/11/24 1403 -- -- -- 84 10 98 % -- -- -- --   09/11/24 1402 -- -- -- 80 10 95 % -- -- -- --   09/11/24 1401 -- -- -- 86 (!) 9 98 % -- -- -- --   09/11/24 1400 129/84 -- -- 81 (!) 7 97 % -- -- -- 83 bpm   09/11/24 1359 -- -- -- 85 16 96 % -- -- -- --   09/11/24 1358 -- -- -- 83 11 98 % -- -- -- --   09/11/24 1357 -- -- -- 77 (!) 9 95 % -- -- -- --   09/11/24 1356 -- -- -- 86 11 97 % -- -- -- --   09/11/24 1355 -- -- -- 85 (!) 9 98 % -- -- -- --   09/11/24 1354 -- -- -- 87 (!) 9 96 % -- -- -- --   09/11/24 1353 -- -- -- 89 21 96 % -- -- -- --   09/11/24 1352 -- -- -- 80 (!) 8 97 % -- -- -- --   09/11/24 1351 -- -- -- 85 13 97 % -- -- -- --   09/11/24 1350 -- -- -- 84 (!) 0 97 % -- -- -- --   09/11/24 1349 -- -- -- 84 14 97 % -- -- -- --   09/11/24 1348 -- -- -- 81 12 96 % -- -- -- --   09/11/24 1347 -- -- -- 81 11 95 % -- -- -- --   09/11/24 1346 -- -- -- 89 10 96 % -- -- -- --   09/11/24 1345 125/87 97.5  F (36.4  C) Axillary 87 12 98 % -- -- -- --   09/11/24 1340 -- -- -- 85 10 95 % -- -- -- --   09/11/24 1335 -- -- -- 82 15 98 % -- -- -- --   09/11/24 1330 (!) 137/99 -- -- 84 19 98 % -- -- -- --   09/11/24 1325 -- -- -- 80 14 97 % -- -- -- --   09/11/24 1320 -- -- -- 75 14 98 % -- -- -- --   09/11/24 1316 (!) 141/91 -- -- 77 16 95 % -- -- -- --   09/11/24 1315 -- -- -- 83 16 96 % -- -- -- --   09/11/24 1314 (!) 143/97 -- --  "80 10 -- -- -- -- --   24 1312 -- 97.6  F (36.4  C) -- -- 14 -- -- -- -- --   24 1310 (!) 143/97 -- -- 73 (!) 7 95 % -- -- -- --   24 1305 -- -- -- 77 (!) 0 96 % -- -- -- --   24 1303 (!) 156/79 -- -- 79 (!) 8 98 % -- -- -- 80 bpm   24 1300 (!) 177/106 -- -- 74 (!) 8 95 % -- -- -- --   24 1255 -- -- -- 84 (!) 8 94 % -- -- -- --   24 1250 -- -- -- 77 13 96 % -- -- -- --   24 1245 (!) 179/102 -- -- 77 10 95 % -- -- -- --   24 1240 -- -- -- 82 (!) 8 94 % -- -- -- --   24 1235 (!) 183/106 -- -- 82 (!) 7 94 % -- -- -- --   24 1230 (!) 145/110 97.3  F (36.3  C) Axillary 87 10 94 % -- -- -- --   24 1225 -- -- -- 89 16 96 % -- -- -- --   24 1220 -- -- -- 73 13 98 % -- -- -- --   24 1215 (!) 142/113 -- -- 77 12 98 % -- -- -- --   24 1210 -- -- -- 91 21 94 % -- -- -- --   24 1205 (!) 126/106 -- -- 86 12 96 % -- -- -- --   24 1200 (!) 163/143 -- -- 75 21 97 % -- -- -- --   24 1155 -- -- -- 75 15 99 % -- -- -- --   24 1150 -- -- -- 78 (!) 6 92 % -- -- -- --   24 1145 108/84 -- -- -- -- -- 1.575 m (5' 2\") 95.3 kg (210 lb) 38.41 83 bpm   24 1120 (!) 140/98 -- -- -- -- -- -- -- -- 70 bpm     Gen: NAD. Alert, oriented. Resting comfortably in bed.  CV: Regular rate  Resp: On room air, no increased work of breathing  Abd: Soft, appropriately tender, fundus firm  below the umbilicus, appropriately tender  Incision: C/D/I, covered with clean bandage    UOP: adequate, 6.36 mL/kg/hr    Labs:   Hemoglobin   Date Value Ref Range Status   2024 7.4 (L) 11.7 - 15.7 g/dL Final   2024 9.2 (L) 11.7 - 15.7 g/dL Final     A/P: Murali Norwood is a 44 year old  who is POD#1 s/p PLTCS and BS for tri/tri triplet gestation. Delivery complicated by postpartum hemorrhage, see below.    # cHTN  BP normotensive overnight.   - AST 53>48>47, plts 132 o/w HELLP nl (), plt pending this morning  - Continue " PTA Labetalol 200 TID   - Several spurious sustained severe range blood pressures were charted however BP cuff was on her leg. Once cuff was moved to her arm, she had an isolated non-sustained severe range blood pressure. If additional severe range blood pressures, she would meet criteria for SI-Preeclampsia with Severe Features. Continue close monitoring of blood pressures.     # Low Urine Output, now resolved  Immediately postoperatively she had low urine output secondary to large PPH. Now improved. Cr normal within SHELLEY.   - Continue to monitor otherwise no interventions    # Postpartum/Postop  - Pain: Continue scheduled Ibuprofen/Tylenol and prn Oxycodone; added flexeril.  - Heme: Hgb 11.9> QBL 3945 (mthg, TXA, hema, MI miso, s/p Norma, Ancef redose)> 9.2> 1U pRBC, 1 FFP, 1 cryo>7.4> 6.3. Will give 1u pRBCs this morning.  - Mild coagulopathy, now resolving. INR 1.19>>1.05, fibr 136>>209, PT/PTT nl, plts 132> 120  - GI: Scheduled senna BID, simethicone TID. Prn miralax, suppository, anti-emetics  - : s/p Mclaughlin. Follow-up voiding   - PNC: Rh pos, Rubella immune  - Pumping and breast feeding; no issues  - Contraception: s/p bilateral salpingectomy  - PPx: Encourage ambulation, IS, SCDs while confined to bed, lovenox while inpatient (not yet ordered given PPH)    Medically Ready for Discharge: Anticipated in 2-4 Days    Irina Javier MD  Obstetrics & Gynecology, PGY-3  9/12/2024 8:34 AM     OBGYN Attending Attestation    Murali Norwood was seen and examined by me separately from the team.  I have reviewed and agree with the above note by Dr. Javier.    I personally reviewed the following and key findings are: POD1 from scheduled PLTCS, BS for tri-tri triplets. Delivery was complicated by large postpartum hemorrhage and mild coagulopathy resulting from this. Coagulopathy now resolving. AM Hgb 6.3, will give 1u pRBCs and check 4h posttransfusion Hgb. If appropriate rise, will add lovenox. Patient is  currently asymptomatic from ABLA. Vitals normal.     Complained of increased pain this morning of abdomen and mouth pain, flexeril added for improved pain control. Suspect mouth pain is from clenching during  section. Recommended flexeril and ice/heat.    Pregnancy was complicated by chronic hypertension, BP are normotensive currently on PTA labetalol.      I agree with the plan as above.    Vanesa Back MD  [facility], Ob/Gyn  2024 9:12 AM

## 2024-09-13 ENCOUNTER — LACTATION ENCOUNTER (OUTPATIENT)
Dept: OTHER | Facility: CLINIC | Age: 44
End: 2024-09-13

## 2024-09-13 VITALS
WEIGHT: 206.57 LBS | BODY MASS INDEX: 38.01 KG/M2 | TEMPERATURE: 98.6 F | SYSTOLIC BLOOD PRESSURE: 128 MMHG | RESPIRATION RATE: 18 BRPM | DIASTOLIC BLOOD PRESSURE: 76 MMHG | HEIGHT: 62 IN | OXYGEN SATURATION: 99 % | HEART RATE: 91 BPM

## 2024-09-13 PROBLEM — O10.919 CHRONIC HYPERTENSION AFFECTING PREGNANCY: Status: ACTIVE | Noted: 2024-09-13

## 2024-09-13 LAB
ABO/RH(D): NORMAL
ANTIBODY SCREEN: NEGATIVE
BLD PROD TYP BPU: NORMAL
BLOOD COMPONENT TYPE: NORMAL
CODING SYSTEM: NORMAL
CROSSMATCH: NORMAL
ERYTHROCYTE [DISTWIDTH] IN BLOOD BY AUTOMATED COUNT: 16.2 % (ref 10–15)
HCT VFR BLD AUTO: 19.5 % (ref 35–47)
HGB BLD-MCNC: 6.4 G/DL (ref 11.7–15.7)
HOLD SPECIMEN: NORMAL
ISSUE DATE AND TIME: NORMAL
MCH RBC QN AUTO: 30.3 PG (ref 26.5–33)
MCHC RBC AUTO-ENTMCNC: 32.8 G/DL (ref 31.5–36.5)
MCV RBC AUTO: 92 FL (ref 78–100)
PLATELET # BLD AUTO: 129 10E3/UL (ref 150–450)
RBC # BLD AUTO: 2.11 10E6/UL (ref 3.8–5.2)
SPECIMEN EXPIRATION DATE: NORMAL
UNIT ABO/RH: NORMAL
UNIT NUMBER: NORMAL
UNIT STATUS: NORMAL
UNIT TYPE ISBT: 5100
WBC # BLD AUTO: 11.1 10E3/UL (ref 4–11)

## 2024-09-13 PROCEDURE — 99238 HOSP IP/OBS DSCHRG MGMT 30/<: CPT | Mod: GC | Performed by: OBSTETRICS & GYNECOLOGY

## 2024-09-13 PROCEDURE — 250N000013 HC RX MED GY IP 250 OP 250 PS 637

## 2024-09-13 PROCEDURE — 250N000013 HC RX MED GY IP 250 OP 250 PS 637: Performed by: STUDENT IN AN ORGANIZED HEALTH CARE EDUCATION/TRAINING PROGRAM

## 2024-09-13 PROCEDURE — 86900 BLOOD TYPING SEROLOGIC ABO: CPT

## 2024-09-13 PROCEDURE — 86923 COMPATIBILITY TEST ELECTRIC: CPT

## 2024-09-13 PROCEDURE — P9016 RBC LEUKOCYTES REDUCED: HCPCS

## 2024-09-13 PROCEDURE — 36415 COLL VENOUS BLD VENIPUNCTURE: CPT

## 2024-09-13 PROCEDURE — 85027 COMPLETE CBC AUTOMATED: CPT

## 2024-09-13 PROCEDURE — 250N000011 HC RX IP 250 OP 636

## 2024-09-13 RX ORDER — AMOXICILLIN 250 MG
1 CAPSULE ORAL DAILY
Qty: 100 TABLET | Refills: 0 | Status: SHIPPED | OUTPATIENT
Start: 2024-09-13

## 2024-09-13 RX ORDER — IBUPROFEN 600 MG/1
600 TABLET, FILM COATED ORAL EVERY 6 HOURS PRN
Qty: 60 TABLET | Refills: 0 | Status: SHIPPED | OUTPATIENT
Start: 2024-09-13

## 2024-09-13 RX ORDER — CYCLOBENZAPRINE HCL 10 MG
10 TABLET ORAL 3 TIMES DAILY PRN
Qty: 30 TABLET | Refills: 0 | Status: SHIPPED | OUTPATIENT
Start: 2024-09-13

## 2024-09-13 RX ORDER — ACETAMINOPHEN 325 MG/1
650 TABLET ORAL EVERY 6 HOURS PRN
Qty: 100 TABLET | Refills: 0 | Status: SHIPPED | OUTPATIENT
Start: 2024-09-13

## 2024-09-13 RX ORDER — SODIUM CHLORIDE 9 MG/ML
INJECTION, SOLUTION INTRAVENOUS
Status: DISCONTINUED
Start: 2024-09-13 | End: 2024-09-13 | Stop reason: HOSPADM

## 2024-09-13 RX ORDER — DIPHENHYDRAMINE HYDROCHLORIDE AND LIDOCAINE HYDROCHLORIDE AND ALUMINUM HYDROXIDE AND MAGNESIUM HYDRO
10 KIT EVERY 6 HOURS PRN
Status: DISCONTINUED | OUTPATIENT
Start: 2024-09-13 | End: 2024-09-13 | Stop reason: HOSPADM

## 2024-09-13 RX ORDER — POLYETHYLENE GLYCOL 3350 17 G/17G
1 POWDER, FOR SOLUTION ORAL 2 TIMES DAILY PRN
Qty: 510 G | Refills: 0 | Status: SHIPPED | OUTPATIENT
Start: 2024-09-13

## 2024-09-13 RX ORDER — FERROUS SULFATE 325(65) MG
325 TABLET ORAL
Qty: 90 TABLET | Refills: 0 | Status: SHIPPED | OUTPATIENT
Start: 2024-09-13

## 2024-09-13 RX ORDER — LABETALOL 100 MG/1
200 TABLET, FILM COATED ORAL 3 TIMES DAILY
Qty: 90 TABLET | Refills: 1 | Status: SHIPPED | OUTPATIENT
Start: 2024-09-13 | End: 2024-09-25

## 2024-09-13 RX ADMIN — SIMETHICONE 80 MG: 80 TABLET, CHEWABLE ORAL at 02:17

## 2024-09-13 RX ADMIN — LABETALOL HYDROCHLORIDE 200 MG: 200 TABLET, FILM COATED ORAL at 08:43

## 2024-09-13 RX ADMIN — CYCLOBENZAPRINE 10 MG: 10 TABLET, FILM COATED ORAL at 06:14

## 2024-09-13 RX ADMIN — OXYCODONE HYDROCHLORIDE 5 MG: 5 TABLET ORAL at 02:17

## 2024-09-13 RX ADMIN — IBUPROFEN 800 MG: 800 TABLET, FILM COATED ORAL at 04:07

## 2024-09-13 RX ADMIN — OXYCODONE HYDROCHLORIDE 5 MG: 5 TABLET ORAL at 06:14

## 2024-09-13 RX ADMIN — ENOXAPARIN SODIUM 40 MG: 40 INJECTION SUBCUTANEOUS at 13:54

## 2024-09-13 RX ADMIN — ACETAMINOPHEN 975 MG: 325 TABLET ORAL at 04:07

## 2024-09-13 RX ADMIN — ACETAMINOPHEN 975 MG: 325 TABLET ORAL at 10:47

## 2024-09-13 RX ADMIN — SENNOSIDES AND DOCUSATE SODIUM 2 TABLET: 50; 8.6 TABLET ORAL at 08:43

## 2024-09-13 RX ADMIN — LABETALOL HYDROCHLORIDE 200 MG: 200 TABLET, FILM COATED ORAL at 13:54

## 2024-09-13 RX ADMIN — OXYCODONE HYDROCHLORIDE 5 MG: 5 TABLET ORAL at 10:47

## 2024-09-13 RX ADMIN — IBUPROFEN 800 MG: 800 TABLET, FILM COATED ORAL at 10:47

## 2024-09-13 ASSESSMENT — ACTIVITIES OF DAILY LIVING (ADL)
ADLS_ACUITY_SCORE: 20

## 2024-09-13 NOTE — PROGRESS NOTES
OB Postpartum Progress Note    S: Feeling well this morning. Pain well controlled. Lochia improving. Tolerating regular diet without nausea or emesis. Passing flatus and has BM. Ambulating without dizziness or lightheadedness. Spontaneously voiding. Pumping with plans to breast feed and formula feed babies in NICU.    No headache, vision changes, CP, SOB, RUQ pain, fevers, chills.    O:  Patient Vitals for the past 24 hrs:   BP Temp Temp src Pulse Resp Weight   24 0627 -- -- -- -- -- 93.7 kg (206 lb 9.1 oz)   24 0617 120/79 -- Oral 85 14 --   24 0200 112/62 98.3  F (36.8  C) Oral 95 16 --   24 2148 138/78 -- -- 78 18 --   24 1615 127/80 98.4  F (36.9  C) Oral 90 20 --   24 1549 132/76 98.4  F (36.9  C) Oral 85 18 --   24 1445 130/76 98.6  F (37  C) Oral 90 20 --   24 1356 128/74 98.4  F (36.9  C) Oral 81 20 --   24 1340 112/69 97.8  F (36.6  C) Oral 83 16 --   24 1248 101/66 98.2  F (36.8  C) Oral 81 16 --   24 0733 127/86 98.5  F (36.9  C) Oral 83 16 --     Gen: NAD. Alert, oriented. Resting comfortably in bed.  CV: Regular rate  Resp: On room air, no increased work of breathing  Abd: Soft, appropriately tender, fundus firm  below the umbilicus, appropriately tender  Incision: C/D/I, though with steri strips with some moisture> discussed InterDry    Labs:   Hemoglobin   Date Value Ref Range Status   2024 7.3 (L) 11.7 - 15.7 g/dL Final   2024 6.3 (LL) 11.7 - 15.7 g/dL Final     A/P: Murali CHANEL Norwood is a 44 year old  who is POD#2 s/p PLTCS and BS for tri/tri triplet gestation. Delivery complicated by postpartum hemorrhage, see below.    # cHTN  BP normotensive overnight.   - AST 53>48>47, plts 132 o/w HELLP nl ()  - Continue PTA Labetalol 200 TID   - Several spurious sustained severe range blood pressures were charted however BP cuff was on her leg. Once cuff was moved to her arm, she had an isolated non-sustained severe  range blood pressure. If additional severe range blood pressures, she would meet criteria for SI-Preeclampsia with Severe Features. Continue close monitoring of blood pressures.   - Asymptomatic  - Amenable to HOPE-BP at discharge    # Low Urine Output, now resolved  Immediately postoperatively she had low urine output secondary to large PPH. Now improved. Cr normal without pre-renal SHELLEY.   - Continue to monitor otherwise no interventions    # Postpartum/Postop  # Acute blood loss anemia  # Coagulopathy, resolving  - Pain: Continue scheduled Ibuprofen/Tylenol and prn Oxycodone; added flexeril.  - Heme: Hgb 11.9> QBL 3945 (TXA, hema, AR miso, s/p Norma, Ancef x2)> 9.2> 1U pRBC>7.4>>6.3> 1u pRBCs>7.3> AM CBC   - Mild coagulopathy, now resolving. INR 1.19> 1U FFP> 1.12> 1.05, fibr 136>1U cryo>156>209, PT/PTT nl, plts 132>120>140  - Asymptomatic from blood loss  - GI: Scheduled senna BID, simethicone TID. Prn miralax, suppository, anti-emetics  - : Voiding spontaneously  - PNC: Rh pos, Rubella immune  - Pumping and breast feeding; no issues  - Contraception: s/p bilateral salpingectomy  - PPx: Encourage ambulation, IS, SCDs while confined to bed, lovenox while inpatient     Medically Ready for Discharge: pending transfusion.    Irina Javier MD  Obstetrics & Gynecology, PGY-3  9/13/2024 7:05 AM       I have seen and examined the patient without the resident. I have reviewed, edited, and agree with the note.   My findings are:  Hemoglobin   Date Value Ref Range Status   09/13/2024 6.4 (LL) 11.7 - 15.7 g/dL Final   09/12/2024 7.3 (L) 11.7 - 15.7 g/dL Final     Acute blood loss anemia now s/p 2 unit(s) PRBC. Continues to pete/equilibrate. Amenable to another unit today. Reassess for discharge later today.  Shannon López MD

## 2024-09-13 NOTE — CONSULTS
Copy and pasted from babies' charts:    JEANNE received order to see patient due to NICU admission.  Family is know to this writer due to social work involvement in [facility] care.       JEANNE visited Misbah in her [facility] room this morning.  Misbah shared her happiness about the triplets' arrival, and is pleased with how well they seem to be doing.  She shared the triplets' are named Nimisha, Fausto, and Eloy.  She endorses an eagerness to know when they will come home. She understands the team cannot predict perfectly what a discharge date will be, but acknowledges she is feeling stress/overwhelm due to not knowing.  She indicates her  had to step out to grab items at home, but is on his way back to the hospital.       Misbah shared she has been able to follow up with some of the community resources previously provided by JEANNE.  She indicates she has gotten a handful of clothes.  She is working with [employer], and hopes to get car seats and a stroller through them.  She reports she has a meeting with them on the .     JEANNE provided parking pass due to Misbah endorsing financial strain in preparing to take 3 newborns home.  Misbah appeared in good spirits throughout this conversation and was encouraged to connect with JEANNE about ongoing questions/needs.     Psychosocial assessment completed 24 copy and pasted below:     Social Work Initial Consult     DATA/ASSESSMENT     General Information  JEANNE met Misbah in the [facility] clinic as part of a NICU consult earlier this week.  JEANNE completed a follow-up phone call with Misbah this afternoon.  She is currently 32w5d pregnant with tri/tri triplets with a scheduled  on 2024.     Living Environment:   Misbah and her  currently reside in [place], [place].  Misbah indicates she typically  resides in the [place] ([place] she is from), and visits her  in [place] each summer.  She expressed that due to traveling restraints while pregnant, she is not able to return  to the [place] for delivery.  She is in the [place] on an ESTA visa.     Assessment of Support  Misbah shares concern about lack of support in the [place] and endorses feelings of overwhelm.  She indicated that her siblings in the [place] may be able to visit once the triplets are discharged, but indicates it is not guaranteed pending their work schedules.  Misbah states that her  has more friends in [place] that may be able to help.     Misbah shares that she doesn't have many supplies prepared for her babies, and is interested in JEANNE sending her resources.  JEANNE emailed Misbah resources for [employer], [employer], [employer], and [facility].     Financial  Misbah inquired about WIC and SNAP benefits, indicating she spoke with someone at the clinic about it.  JEANNE shared information about WIC and the [facility] phone number.  JEANNE and Misbah discussed potential eligibility concerns for SNAP due to Misbah not being a [place] citizen.       Misbah shared that she received paperwork from [employer] counseling that she states is confusing.  She texted JEANNE pictures of the paperwork, and it appeared to be an application for [employer].  JEANNE sent an inbox message to the assigned [employer] financial counselor inquiring about next steps for Misbah.     Coping/Stress  Misbah endorses feelings of stress and overwhelm in anticipation of the triplets.  She is weary about what support will look like.  Misbah is seeing a therapist through the [facility] that she finds helpful, and looks forward to continuing these sessions postpartum.     INTERVENTION     Conducted chart review and consulted with medical team regarding plan of care. Introduced SW role and scope of practice.   Provided assessment of patient and family's level of coping  Validated emotions and provided supportive listening  Facilitated service linkage with hospital and community resources  Contacted  "[employer] financial counseling  Provided SW contact info     PLAN     SW will continue to follow for supportive intervention.      Kalley Thurner, FARSHAD, LGSW  Maternal and Child Health   Reachable via [employer] messenger & call  [email or web address]     After hours social work can be reached via [employer] @ \"Peds SW After Hours On Call 1620 to 08\"  Weekend on-site social work can be reached via [employer] @ \"Peds SW Weekend Onsite 08 to 1630\"         "

## 2024-09-13 NOTE — PLAN OF CARE
"Goal Outcome Evaluation:      Plan of Care Reviewed With: patient    Overall Patient Progress: improvingOverall Patient Progress: improving    Outcome Evaluation: Postpartum  from 24 with CHTN on labetalol with vital signs stable and reproductive assessment WDL. Incision open to air with steri strips and no drainage. Up ad devante and voiding spontaneously without difficulties. Pain well-controlled with current regimen. Complains of oral discomfort this morning, Dr. Javier aware and placed PRN orders. Hgb of 6.4 this morning reported to Dr. Jiménez. Plan of care reviewed with patient; she states understanding and agrees to plan of care.      Problem: Adult Inpatient Plan of Care  Goal: Plan of Care Review  Description: The Plan of Care Review/Shift note should be completed every shift.  The Outcome Evaluation is a brief statement about your assessment that the patient is improving, declining, or no change.  This information will be displayed automatically on your shift  note.  Outcome: Progressing  Flowsheets (Taken 2024 0749)  Outcome Evaluation:   Postpartum  from 24 with CHTN on labetalol with vital signs stable and reproductive assessment WDL. Incision open to air with steri strips and no drainage. Up ad devante and voiding spontaneously without difficulties. Pain well-controlled with current regimen. Complains of oral discomfort this morning, Dr. Javier aware and placed PRN orders. Hgb of 6.4 this morning reported to Dr. Jiménez. Plan of care reviewed with patient   she states understanding and agrees to plan of care.  Plan of Care Reviewed With: patient  Overall Patient Progress: improving  Goal: Patient-Specific Goal (Individualized)  Description: You can add care plan individualizations to a care plan. Examples of Individualization might be:  \"Parent requests to be called daily at 9am for status\", \"I have a hard time hearing out of my right ear\", or \"Do not touch me to wake me up as it " "startles  me\".  Outcome: Progressing  Flowsheets (Taken 9/13/2024 0749)  Individualized Care Needs: around-the-clock dosing utilized  Anxieties, Fears or Concerns: Pain control  Patient/Family-Specific Goals (Include Timeframe): Pain level of 1 out of 10 after interventions.  Goal: Absence of Hospital-Acquired Illness or Injury  Outcome: Progressing  Intervention: Prevent Skin Injury  Recent Flowsheet Documentation  Taken 9/13/2024 0217 by Cintia Garcia RN  Body Position: position changed independently  Skin Protection: adhesive use limited  Intervention: Prevent and Manage VTE (Venous Thromboembolism) Risk  Recent Flowsheet Documentation  Taken 9/13/2024 0617 by Cintia Garcia RN  VTE Prevention/Management:   SCDs off (sequential compression devices)   patient refused intervention  Taken 9/13/2024 0217 by Cintia Garcia RN  VTE Prevention/Management:   SCDs off (sequential compression devices)   patient refused intervention  Intervention: Prevent Infection  Recent Flowsheet Documentation  Taken 9/13/2024 0217 by Cintia Garcia RN  Infection Prevention:   environmental surveillance performed   equipment surfaces disinfected   hand hygiene promoted   personal protective equipment utilized   rest/sleep promoted   single patient room provided   visitors restricted/screened  Goal: Optimal Comfort and Wellbeing  Outcome: Progressing  Intervention: Monitor Pain and Promote Comfort  Recent Flowsheet Documentation  Taken 9/13/2024 0614 by Cintia Garcia RN  Pain Management Interventions: medication (see MAR)  Taken 9/13/2024 0217 by Cintia Garcia RN  Pain Management Interventions:   medication (see MAR)   around-the-clock dosing utilized   repositioned   rest  Taken 9/12/2024 2300 by Cintia Garcia RN  Pain Management Interventions:   around-the-clock dosing utilized   rest  Intervention: Provide Person-Centered Care  Recent Flowsheet Documentation  Taken 9/13/2024 0217 by Cintia Garcia, " RN  Trust Relationship/Rapport:   care explained   thoughts/feelings acknowledged  Goal: Readiness for Transition of Care  Outcome: Progressing     Problem: Postpartum ( Delivery)  Goal: Successful Parent Role Transition  Outcome: Progressing  Intervention: Support Parent Role Transition  Recent Flowsheet Documentation  Taken 2024 by Cintia Garcia RN  Supportive Measures:   active listening utilized   counseling provided   decision-making supported   goal-setting facilitated   positive reinforcement provided   problem-solving facilitated   relaxation techniques promoted   self-care encouraged   self-responsibility promoted   verbalization of feelings encouraged  Goal: Hemostasis  Outcome: Progressing  Goal: Fluid and Electrolyte Balance  Outcome: Progressing  Goal: Absence of Infection Signs and Symptoms  Outcome: Progressing  Intervention: Prevent or Manage Infection  Recent Flowsheet Documentation  Taken 2024 by Cintia Garcia RN  Infection Management: aseptic technique maintained  Goal: Optimal Pain Control and Function  Outcome: Progressing  Intervention: Prevent or Manage Pain  Recent Flowsheet Documentation  Taken 2024 0614 by Cintia Garcia RN  Pain Management Interventions: medication (see MAR)  Taken 2024 by Cintia Garcia RN  Pain Management Interventions:   medication (see MAR)   around-the-clock dosing utilized   repositioned   rest  Perineal Care:   absorbent brief/pad changed   perineum cleansed  Taken 2024 2300 by Cintia Garcia RN  Pain Management Interventions:   around-the-clock dosing utilized   rest  Goal: Nausea and Vomiting Relief  Outcome: Progressing  Goal: Effective Urinary Elimination  Outcome: Progressing  Goal: Effective Oxygenation and Ventilation  Outcome: Progressing     Problem: Hypertensive Disorders in Pregnancy  Goal: Patient-Fetal Stabilization  Outcome: Progressing       Cintia Garcia RN on 2024 at 7:53  AM

## 2024-09-13 NOTE — LACTATION NOTE
This note was copied from a baby's chart.  Lactation Follow Up Note    Reason for visit/ call/ message:  Day of discharge from [facility]    Supply:  Larger drops per mom  More out with hand expression vs the pump    Significant changes (medications, equipment, comfort, etc):  Given rental pump from [facility]    Skin to skin/ nuzzling/ latching:  Encouraged    Education given:  Role of hand expression and pumping while in colostrum phase    Plan:  Check in day 5    Rama Alan, RNC-VIVIAN, IBCLC   Lactation Consultant  [place]: [facility] [phone]  Office: [phone]

## 2024-09-13 NOTE — PLAN OF CARE
VSS and postpartum assessments WDL.  Up ad devante with steady gait and independent with cares.  Pumping for babies in NICU. Pain managed with tylenol, ibuprofen, oxycodone. Reviewed discharge medications, blood pressure cuff given and reviewed use, enrolled in HOPE-BP program.  Reviewed follow-up for appointment in 2 weeks and 6 weeks.  Reviewed discharge instructions and answered all questions.  Discharged with all belongings at 3:35 PM. Patient planning to room-in on 11 floor NICU with babies.

## 2024-09-13 NOTE — DISCHARGE INSTRUCTIONS
Warning Signs after Having a Baby    Keep this paper on your fridge or somewhere else where you can see it.    Call your provider if you have any of these symptoms up to 12 weeks after having your baby.    Thoughts of hurting yourself or your baby  Pain in your chest or trouble breathing  Severe headache not helped by pain medicine  Eyesight concerns (blurry vision, seeing spots or flashes of light, other changes to eyesight)  Fainting, shaking or other signs of a seizure    Call 9-1-1 if you feel that it is an emergency.     The symptoms below can happen to anyone after giving birth. They can be very serious. Call your provider if you have any of these warning signs.    My provider s phone number: _______________________    Losing too much blood (hemorrhage)    Call your provider if you soak through a pad in less than an hour or pass blood clots bigger than a golf ball. These may be signs that you are bleeding too much.    Blood clots in the legs or lungs    After you give birth, your body naturally clots its blood to help prevent blood loss. Sometimes this increased clotting can happen in other areas of the body, like the legs or lungs. This can block your blood flow and be very dangerous.     Call your provider if you:  Have a red, swollen spot on the back of your leg that is warm or painful when you touch it.   Are coughing up blood.     Infection    Call your provider if you have any of these symptoms:  Fever of 100.4 F (38 C) or higher.  Pain or redness around your stitches if you had an incision.   Any yellow, white, or green fluid coming from places where you had stitches or surgery.    Mood Problems (postpartum depression)    Many people feel sad or have mood changes after having a baby. But for some people, these mood swings are worse.     Call your provider right away if you feel so anxious or nervous that you can't care for yourself or your baby.    Preeclampsia (high blood pressure)    Even if you  didn't have high blood pressure when you were pregnant, you are at risk for the high blood pressure disease called preeclampsia. This risk can last up to 12 weeks after giving birth.     Call your provider if you have:   Pain on your right side under your rib cage  Sudden swelling in the hands and face    Remember: You know your body. If something doesn't feel right, get medical help.     For informational purposes only. Not to replace the advice of your health care provider. Copyright 2020 [facility]. All rights reserved. Clinically reviewed by Jyothi Cedeno, RNC-OB, MSN. Intcomex 540483 - Rev 02/23.    You have been provided the Know Your Blood Pressure Numbers document.    Additional copies can be found here: www.fvfiles.com/660353.pdfChecking Your Blood Pressure at Home  During and after pregnancy  How do I measure my blood pressure?  It s important to take the readings at the same time each day, such as morning and evening. Take your blood pressure before taking any morning medications.  How to get the most accurate reading  30 minutes before checking your blood pressure, avoid the following:  Drinking caffeine  Drinking alcohol  Eating  Smoking  Exercising  5 minutes before checking your blood pressure:  Use the bathroom and urinate so you have an empty bladder.  Sit still in a chair for around 5 minutes. Stay calm and relaxed and do not talk if possible.  To check your blood pressure:     Sit up straight in a chair.  Place your feet on the floor. Don t cross your ankles or legs.  Rest your arm at the level of your heart on a table or desk or on the arm of a chair. Use the same arm every day.  Pull up your shirt sleeve. Don t take the measurement over clothes.  Wrap the blood pressure cuff around the upper part of your left arm, 1 inch (2.5 cm) above your elbow.  Fit the cuff snugly around your arm. You should be able to place only one finger between the cuff and your arm.  Position the cord  so that it rests in the bend of your elbow.  Press the power button.  Sit quietly while the cuff inflates and deflates.  Read the digital reading on the monitor screen and write the numbers down (record them) in a notebook.  Wait 2-3 minutes, then repeat the steps, starting at step 1.  Which features do you need?  Arm cuff monitors give the most exact readings.  Wrist and finger blood pressure monitors are often less exact.  Pick a blood pressure monitor that has passed tests to show they measure exactly. Blood pressure cuffs for sale in the [place] that have passed tests are listed on the website www.validatebp.org.  Some monitors that have passed tests are:  Omron 3 Series Upper Arm Blood Pressure Monitor (Model PV1803)  Omron 5 Series Upper Arm Blood Pressure Monitor (Model GF4748)  Omron 7 Series Upper Arm Blood Pressure Monitor (Model HEM-7320)  [employer] Upper Arm Blood Pressure Monitor with Talking Function (UA 1030T)  Don t use smartphone apps. There are many smartphone apps that claim to check your blood pressure using the pulse in your wrist or finger. These don t work. They haven t passed any tests. Don t give your clinic a blood pressure reading from a smartphone liseth.  If you have a flexible spending account (FSA) or health savings account (HSA), you may wish to pay yourself back (reimburse) for the machine and cuff. A blood pressure monitor is an allowed over-the- counter (OTC) item to pay yourself back from these accounts.  Cuff size  The size of the arm cuff is a key feature. Make sure the cuff is the right size for your arm. If the cuff isn t the right size, readings will either be too high or low.  To know what size cuff to buy, measure the distance around your bicep (upper arm).  Use a flexible measuring tape or . Place the measuring tape CHCF between your armpit and elbow. Measure the distance around your arm in inches.  You may need to buy a cuff apart from the machine to get the  "right size.  Cuff sizes and arm measurements  Small adult: 22 to 26 cm (8.7 to 10.2 inches)  Adult: 27 to 34 cm (10.6 to 13.4 inches)  Large adult: 35 to 44 cm (13.8 to 17.3 inches)  Adult thigh: 45 to 52 cm (17.7 to 20.5 inches)    Copyright statement\" content=\"For informational purposes only. Not to replace the advice of your health care provider. Photo: ID [id number]   [employer]. Text copyright   2023 [facility]. All rights reserved. Clinically reviewed by [facility]. EnCoate 914217 - REV 03/23.    "

## 2024-09-13 NOTE — PROVIDER NOTIFICATION
09/13/24 0810   Provider Notification   Provider Name/Title Dr. David Walsh   Method of Notification Electronic Page   Notification Reason Lab Results;Other     Clarification of plan of care regarding hemoglobin, per provider will plan to give a unit of PBRCs.

## 2024-09-13 NOTE — PROVIDER NOTIFICATION
"   24 0639   Provider Notification   Provider Name/Title Dr. TIKI Mayfield   Method of Notification Electronic Page   Request Evaluate-Remote   Notification Reason Medication Request  (Patient requests Orajel)     S-(situation): Patient requesting Orajel for sore mouth    B-(background):  from 24 with triplets in NICU that had QBL of 4063ml    A-(assessment): Patient feels like her mouth is sore. She states possibly from intubation    R-(recommendations): Murali Norwood requesting Orajel.       Cintia Garcia RN on 2024 at 6:40 AM        At 06:41, Dr. Javier came by nursing station to round on patient. Provider updated on the above information prior to rounding on patient. She states she will order lozenges and \"magic mouthwash\". See orders.       Cintia Garcia RN on 2024 at 6:47 AM   "

## 2024-09-13 NOTE — PLAN OF CARE
Problem: Adult Inpatient Plan of Care  Goal: Optimal Comfort and Wellbeing  Outcome: Progressing     Problem: Postpartum ( Delivery)  Goal: Successful Parent Role Transition  Outcome: Progressing     Problem: Postpartum ( Delivery)  Goal: Fluid and Electrolyte Balance  Outcome: Progressing     Problem: Postpartum ( Delivery)  Goal: Optimal Pain Control and Function  Outcome: Progressing   Goal Outcome Evaluation:      Plan of Care Reviewed With: patient    Overall Patient Progress: improvingOverall Patient Progress: improving    Outcome Evaluation: Patient is stable, ambulating well in the room with no dizziness or light headedness and independent with personal hygiene. Blood transfusion given and tolerated well. Pain is better managed with flexeril and ibuprofen as claimed. She's voiding and had a bowel movement. Pumping for babies in NICU. Will continue with plan of care.

## 2024-09-14 RX ORDER — OXYCODONE HYDROCHLORIDE 5 MG/1
5 TABLET ORAL EVERY 6 HOURS PRN
Qty: 6 TABLET | Refills: 0 | Status: SHIPPED | OUTPATIENT
Start: 2024-09-14 | End: 2024-09-17

## 2024-09-15 ENCOUNTER — LACTATION ENCOUNTER (OUTPATIENT)
Dept: OTHER | Facility: CLINIC | Age: 44
End: 2024-09-15

## 2024-09-15 ENCOUNTER — PATIENT OUTREACH (OUTPATIENT)
Dept: CARE COORDINATION | Facility: CLINIC | Age: 44
End: 2024-09-15
Payer: MEDICAID

## 2024-09-15 NOTE — PROGRESS NOTES
Connected Care Resource Center:   [facility] Contact  [facility]/Voicemail     Clinical Data: Post-Discharge Outreach     Outreach attempted x 2.  Left message on patient's voicemail, providing [facility]'s central phone number of [phone] ([phone]) for questions/concerns and/or to schedule an appt with an [facility] provider, if they do not have a PCP.      Plan:  [facility] will do no further outreaches at this time.       Samira Quan MA  Connected Care Resource Center, [facility]    *Connected Care Resource Team does NOT follow patient ongoing. Referrals are identified based on internal discharge reports and the outreach is to ensure patient has an understanding of their discharge instructions.

## 2024-09-15 NOTE — LACTATION NOTE
This note was copied from a baby's chart.  Lactation Follow Up Note    Reason for visit/ call/ message:  Observe breastfeeding with Nimisha.    Supply:  Misbah indicated small volumes of expressed milk with pumping by pointing at bottle (~10-15ml/pp? ) but has not been pumping often in the past day.    Significant changes (medications, equipment, comfort, etc):  She states pumping is comfortable.    Skin to skin/ nuzzling/ latching:  Misbah held her baby somewhat vertically with a pillow support. Baby was latched with wide gape and had nutritive sucking pattern however neither looked comfortable. Repositioned mom with pillows for support and elevated her feet which improved comfort level. Mom switched to supportive cross cradle but baby now sleepy and without cues as gavage feeding running. We discussed supportive hold, positioning, latch, breastfeeding patterns and infant driven feeding, breast support and compressions, skin to skin benefits, and timing of pumpings around breastfeedings.      Education given:  I gave her a pump kit and pumping log and encouraged pumping every 3 hours after feedings/skin to skin holding.   Reviewed pumping recommendations and the hormonal window for establishing a supply.   Promoted maternal self care.     Plan:  Misbah would like assistance with latching tomorrow when she visits.   Consider adding nipple shield if indicated.  Supply check (now logging); change to maintain setting with next visit (day+5)    Perla Gould, RNC-VIVIAN, IBCLC   Lactation Consultant  [facility]: [facility] [phone]  Office: [phone]

## 2024-09-17 ENCOUNTER — LACTATION ENCOUNTER (OUTPATIENT)
Dept: OTHER | Facility: CLINIC | Age: 44
End: 2024-09-17

## 2024-09-17 NOTE — LACTATION NOTE
"This note was copied from a baby's chart.  Lactation Follow Up Note    Reason for visit/ call/ message:  Latch support for Male-KASANDRA \"Denu\"    Supply:  Misbah reports a history of over supply with older children (now 20 & 17 y.o.), states that as soon as she pumps her breasts become painful and she makes too much milk. Lc reviews difference between experiences ( age and medical separation). Encourages Misbah to pump q 3-4 hours around the clock to support establishing full milk supply. If here at the hospital and breastfeeding ok to skip that pumping session.     Significant changes (medications, equipment, comfort, etc):  N/a    Skin to skin/ nuzzling/ latching:  Akinwanda, Male-B Omobolanle \"Denu\"  Misbah has Denu at the breast when LC arrives, he is latched and actively suckling, Misbah reports it is comfortable. Lc reviews positioning and sgins of a good latch, provides encouragement for great positioning during feeding.   Post-weight shows a transfer of 20 mL!     Education given:  Establishing/supporting supply, importance of regular milk removal, positioning and supportive hold at breast, signs of a good latch/transfer, lactation support     Plan:  Continue to breastfeed as able with a focus on supportive positioning.   Remove milk from the breast regularly; goal for q 3-4 hours, breastfeeding or pumping when  from infants.   Contact lactation as needed for support.     Lacy Guaman, RN, IBCLC   Lactation Consultant  [facility]: [facility] [phone]  Office: [phone]      "

## 2024-09-19 ENCOUNTER — TELEPHONE (OUTPATIENT)
Dept: MATERNAL FETAL MEDICINE | Facility: CLINIC | Age: 44
End: 2024-09-19
Payer: MEDICAID

## 2024-09-19 ENCOUNTER — OFFICE VISIT (OUTPATIENT)
Dept: MATERNAL FETAL MEDICINE | Facility: CLINIC | Age: 44
End: 2024-09-19
Attending: STUDENT IN AN ORGANIZED HEALTH CARE EDUCATION/TRAINING PROGRAM
Payer: MEDICAID

## 2024-09-19 VITALS
HEART RATE: 80 BPM | RESPIRATION RATE: 20 BRPM | BODY MASS INDEX: 36.98 KG/M2 | DIASTOLIC BLOOD PRESSURE: 87 MMHG | OXYGEN SATURATION: 98 % | WEIGHT: 202.2 LBS | SYSTOLIC BLOOD PRESSURE: 136 MMHG

## 2024-09-19 DIAGNOSIS — B37.2 YEAST INFECTION OF THE SKIN: Primary | ICD-10-CM

## 2024-09-19 RX ORDER — NYSTATIN 100000 [USP'U]/G
POWDER TOPICAL 3 TIMES DAILY PRN
Qty: 30 G | Refills: 1 | Status: SHIPPED | OUTPATIENT
Start: 2024-09-19

## 2024-09-19 ASSESSMENT — PAIN SCALES - GENERAL: PAINLEVEL: MILD PAIN (2)

## 2024-09-19 NOTE — TELEPHONE ENCOUNTER
Called and LVM for patient to call back. Received mychart message from patient regarding swelling and wanted to follow up.

## 2024-09-19 NOTE — NURSING NOTE
Murali here for 10 day postpartum visit following C/S delivery at 34w4d gestation for triplet pregnancy Evaluated for pain which patient reports is well controlled with current regimen of Tylenol and Ibuprofen. Pt has concerns of incision. Pt had steri strips removed. Dr. Alonzo looked at incision. Determined that it looked like yeast so Nystatin was ordered to pharmacy. Evaluated bleeding, reports lochia is small amount and without foul odor. Evaluated incision and removed steri strips, well approximated without redness. Pt has some tenderness to pt lower right side, and a bit edematous but pt will watch.  Breastfeeding/pumping/bottle feeding babies is going well.  Evaluated bowel/bladder status WNL. Evaluated BP and discussed Hope Bp program.  Plan for birth control was bilateral salpingectomy. Dr. Alonzo viewed incision, but all the rest of the assessment was WNL. Pt will have PPV at 6 weeks. Pt reports babies are doing well in the NICU.

## 2024-09-19 NOTE — PROGRESS NOTES
Misbah came in for incision check. Incision intact, slightly erythematous with yeasty odor. 3x4cm seroma on right upper most aspect of incision, tender to palpation. No signs of superficial wound infection. Interdry provided, nystatin powder sent to pharmacy. f/u at PP visit.     Marisol Alonzo MD   Maternal Fetal Medicine Fellow    ----                                                                                                            M Physician Attestation  Patient was discussed with fellow. I agree with the their findings and plan of care as documented in the note.      Elva Quinones MD  Maternal Fetal Medicine   Date of Service (when I saw the patient): 9/19/2024

## 2024-09-19 NOTE — TELEPHONE ENCOUNTER
Call back from patient. Reports she has noted swelling at her incision site and reports it appears it is opening. Instructed patient to come into clinic for a nurse only visit for an incision check. Patient agreeable and verbalizes understanding.

## 2024-09-21 ENCOUNTER — LACTATION ENCOUNTER (OUTPATIENT)
Dept: OTHER | Facility: CLINIC | Age: 44
End: 2024-09-21

## 2024-09-21 NOTE — LACTATION NOTE
This note was copied from a baby's chart.  Lactation Follow Up Note    Reason for visit/ call/ message:  Met with Misbah for discharge teaching for triplet C (Nimisha).  Supply check DOL 10.    Supply:  She is pumping every 5 hours for 2-3 full bottles per pumping; pumping has been comfortable.     Significant changes (medications, equipment, comfort, etc):  Triplet C is discharging today (see discharge note in Nimisha's chart)    Skin to skin/ nuzzling/ latching:  Latching occasionally; encouragement provided for latching and holding skin to skin as able.     Education given:  We discussed her goals for breastfeeding; Misbah hopes to provide milk for at least a few months, however will also supplement with formula as needed for the triplets. She is feeling overwhelmed with pumping more often and providing cares for infants. We discussed recommended pumping frequency for long term supply (every 2-3hours during day, every 3-4hours at night), hormone window, benefits of hand on pumping, logging, keeping breast oriented, managing feeding multiples, importance of balancing self care with needs of triplets as well.  Discussed benefits of providing some milk, as it may prove to be unsustainable to pump more often while also caring for the babies.     Plan:  Continue to monitor milk supply. Goal to increase frequency of pumping sessions if able.   Latch assist as requested.     ELIESER Hernandez, RN, IBCLC   Lactation Consultant  [facility]: [facility] [phone]  Office: [phone]

## 2024-09-21 NOTE — LACTATION NOTE
This note was copied from a baby's chart.  I met with Misbah for discharge teaching and gave pertinent handouts (see below).  She is pumping every 5hours for 2-3 full bottles per pumping; pumping has been comfortable. We discussed her goal with breastfeeding; she hopes to provide milk for at least a few months, however will also supplement with formula as needed for the triplets. We discussed recommended pumping frequency for long term supply (every 2-3hours during day, every 3-4hours at night), hormone window, benefits of hand on pumping, managing feeding multiples, importance of balancing self care with needs of triplets as well. Discussed benefits of providing some milk, as it may prove to be unsustainable to pump more often while also caring for the babies. I gave her information on returning rental [employer] and obtaining purchase pump at [facility] when all infants are discharged. Encouraged seeking outpatient support as needed.  Teaching completed, all questions answered and readiness to go home is verbalized.      ELIESER Hernandez, RN, IBCLC   Lactation Consultant  [facility]: [facility]: [phone]  Office: [phone]    [x]Discharge-- [facility] milk storage  [x]Discharge-- After first week feeding log  [x]Discharge-- [facility] peer counselor  [x]Discharge-- [place] lactation resources  [x]Discharge-- [place] Using Donor Milk For Your Baby at Home

## 2024-09-23 ENCOUNTER — TELEPHONE (OUTPATIENT)
Dept: MATERNAL FETAL MEDICINE | Facility: CLINIC | Age: 44
End: 2024-09-23
Payer: MEDICAID

## 2024-09-23 NOTE — TELEPHONE ENCOUNTER
Home Observation of Postpartum Elevated BP (HOPE-BP) Program     Murali Norwood enrolled in the HOPE-BP Program on 9/13/2024, 10 days ago. Delivered on      Cuco, Male-A Murali [[id number]]   9/11/2024      Cuco, Male-B Murali [[id number]]   9/11/2024      Cuco, Female-C Murali [[id number]]   9/11/2024 . Diagnosis: Chronic hypertension. Medication(s) prescribed: Labetalol.  Patient reported the following blood pressures in the past 72 hours:       9/14/2024 9/20/2024 9/21/2024   [facility] Health Trends BP Review Flowsheet   Systolic (Patient Reported) 122 146 154    175   Diastolic (Patient Reported) 73 88 98    99   OBN Symptom  Answer   Very bad headache       Multiple values from one day are sorted in reverse-chronological order     Writer called and spoke with patient to follow up on severe range blood pressure reported over the weekend. Patient reports at the time she had a bad headache, reports she took PRN Tylenol, which improved her headache. She denied: chest pain, difficulty breathing or shortness of breath, right upper quadrant pain, unsteady gait, weakness, intractable headache, blurred vision, dizziness, angioedema or lip swelling, leg edema, difficulty swallowing, palpitations, rash, acute joint redness or pain (gout), and symptomatic hypotension at that time, she denies these same symptoms this morning. Reports she is still taking her Labetalol 200mg TID. Reports her BP this morning was 131/80. Writer reviewed with patient if any severe range blood pressures or any symptoms discussed during the weekend or after hours to either call the after hours line right away or seek care if symptoms are severe to her, patient verbalized understanding. Encouraged patient to continue checking her BP twice a day, putting into mychart and taking her medications as prescribed.

## 2024-09-24 ENCOUNTER — TELEPHONE (OUTPATIENT)
Dept: MATERNAL FETAL MEDICINE | Facility: CLINIC | Age: 44
End: 2024-09-24
Payer: MEDICAID

## 2024-09-24 NOTE — H&P
Ms. Norwood is a 45 yo  at 34w 1d with pregnancy complicated by triplet gestation who [facility] in anticipation of scheduled  delivery on 2024. She received the betamethasone injection on 1001 and was discharged at 1009. No vitals were taking during this visit. She will return to clinic tomorrow as scheduled for her second betamethasone injection and scheduled ultrasound.    Physician Attestation   I did not see the patient on this date.    Cintia Clarke MD

## 2024-09-24 NOTE — PATIENT INSTRUCTIONS
[facility] BP team here again. Just a friendly reminder to take your blood pressure and enter your numbers in to [employer] twice daily.     Thank you,     Thank you,  Emmy Contreras RN  Patient Care Coordinator  Maternal Fetal Medicine  [place]  [phone]

## 2024-09-25 ENCOUNTER — TELEPHONE (OUTPATIENT)
Dept: MATERNAL FETAL MEDICINE | Facility: CLINIC | Age: 44
End: 2024-09-25
Payer: MEDICAID

## 2024-09-25 DIAGNOSIS — O10.919 CHRONIC HYPERTENSION AFFECTING PREGNANCY: ICD-10-CM

## 2024-09-25 DIAGNOSIS — O09.523 MULTIGRAVIDA OF ADVANCED MATERNAL AGE IN THIRD TRIMESTER: ICD-10-CM

## 2024-09-25 DIAGNOSIS — O30.139 TRICHORIONIC TRIAMNIOTIC TRIPLET PREGNANCY, ANTEPARTUM: ICD-10-CM

## 2024-09-25 RX ORDER — LABETALOL 100 MG/1
200 TABLET, FILM COATED ORAL 3 TIMES DAILY
Qty: 90 TABLET | Refills: 1 | Status: SHIPPED | OUTPATIENT
Start: 2024-09-25

## 2024-09-25 NOTE — TELEPHONE ENCOUNTER
"Home Observation of Postpartum Elevated BP (HOPE-BP) Program     Murali Norwood enrolled in the HOPE-BP Program on 9/13/2024, 12 days ago. Delivered on      Cuco, Male-A Murali [[id number]]   9/11/2024      Cuco, Male-B Murali [[id number]]   9/11/2024      Eder Rogel [[id number]]   9/11/2024 . Diagnosis: Chronic hypertension. Medication(s) prescribed: Labetalol.  Patient reported the following blood pressures in the past 72 hours:       9/14/2024 9/20/2024 9/21/2024 9/25/2024   [facility] Health Trends BP Review Flowsheet   Systolic (Patient Reported) 122 146 154    175 158   Diastolic (Patient Reported) 73 88 98    99 97   OBHTN Symptom  Answer   Very bad headache        Multiple values from one day are sorted in reverse-chronological order     Spoke with patient to follow up on blood pressure. Based on self-report, Murali's blood pressures were in the High Range (-159 OR DBP ). Patient reported a mild to moderate headache relieved by medication or non-pharmacologic interventions. Patient denied: intractable headache, chest pain, difficulty breathing or shortness of breath, unsteady gait, weakness, blurred vision, dizziness, right upper quadrant pain, lip swelling (angioedema), difficulty swallowing, palpitations, rash, acute joint redness or pain (gout), leg edema, and symptomatic hypotension.  Patient met the required inclusion criteria for a medication INCREASE using the Standing Order (SO): Licensed Practitioner placed order, \"RN Remote Postpartum Hypertension Medication Titration Standing Order\" and enrolled patient in the [facility] Maternal Hypertension Pathway.  Antihypertensive medication had been initiated by LP prior to use of the SO and the patient reported taking dose as prescribed for at least 24 hours.  The patient reported BP greater than or equal to 140/90 over at least 2 consecutive BPs at least 6 hours apart when patient has " taken medications as prescribed.   SO used to make medication change: Patient was on Labetalol 200 mg three times daily. Increased dose to 400 mg three times daily.. Entered new prescription, sent to patient's requested pharmacy, and discussed with patient. Patient verbalized understanding.    Emmy Contreras RN

## 2024-09-27 ENCOUNTER — TELEPHONE (OUTPATIENT)
Dept: MATERNAL FETAL MEDICINE | Facility: CLINIC | Age: 44
End: 2024-09-27
Payer: MEDICAID

## 2024-09-27 ENCOUNTER — QUESTIONNAIRE SERIES SUBMISSION (OUTPATIENT)
Dept: OTHER | Age: 44
End: 2024-09-27

## 2024-10-01 ENCOUNTER — TELEPHONE (OUTPATIENT)
Dept: MATERNAL FETAL MEDICINE | Facility: CLINIC | Age: 44
End: 2024-10-01
Payer: MEDICAID

## 2024-10-01 NOTE — TELEPHONE ENCOUNTER
"Home Observation of Postpartum Elevated BP (HOPE-BP) Program     Murali Norwood enrolled in the HOPE-BP Program on 9/13/2024, 18 days ago. Delivered on      Briseyda Rogel [[id number]]   9/11/2024      Emilee Rogel [[id number]]   9/11/2024      Eder Rogel [[id number]]   9/11/2024 . Diagnosis: Chronic hypertension. Medication(s) prescribed: Labetalol.  Patient reported the following blood pressures in the past 72 hours:       9/14/2024 9/20/2024 9/21/2024 9/25/2024 9/27/2024   [facility] Health Trends BP Review Flowsheet   Systolic (Patient Reported) 122 146 154    175 158 141   Diastolic (Patient Reported) 73 88 98    99 97 82   OBHTN Symptom  Answer   Very bad headache         Multiple values from one day are sorted in reverse-chronological order     Called to check in with patient due to most recent BPs elevated and no readings entered since 9/27. Misbah reports her BPs were \"lower\" over the weekend. Denies symptoms of pre-eclampsia and states she has been taking her labetalol as prescribed. She plans to check and enter a BP this morning and will call with any changes or concerns.    Risa Linder RN  "

## 2024-10-02 ENCOUNTER — TELEPHONE (OUTPATIENT)
Dept: MATERNAL FETAL MEDICINE | Facility: CLINIC | Age: 44
End: 2024-10-02
Payer: COMMERCIAL

## 2024-10-02 NOTE — TELEPHONE ENCOUNTER
Called patient from [place]-BP line to check in as no BPs entered today. Misbah reports her BP at 0900 today was 132/82 but she did not get a chance to enter it in. Continue with current plan, encouraged to enter BPs twice daily as able. Denies symptoms of pre-e, side effects of medication, or any other concerns. Will call with changes.

## 2024-10-06 ENCOUNTER — HEALTH MAINTENANCE LETTER (OUTPATIENT)
Age: 44
End: 2024-10-06

## 2024-10-08 ENCOUNTER — LAB (OUTPATIENT)
Dept: LAB | Facility: CLINIC | Age: 44
End: 2024-10-08
Attending: ADVANCED PRACTICE MIDWIFE
Payer: COMMERCIAL

## 2024-10-08 ENCOUNTER — TELEPHONE (OUTPATIENT)
Dept: MATERNAL FETAL MEDICINE | Facility: CLINIC | Age: 44
End: 2024-10-08
Payer: COMMERCIAL

## 2024-10-08 ENCOUNTER — OFFICE VISIT (OUTPATIENT)
Dept: MATERNAL FETAL MEDICINE | Facility: CLINIC | Age: 44
End: 2024-10-08
Attending: ADVANCED PRACTICE MIDWIFE
Payer: COMMERCIAL

## 2024-10-08 VITALS
RESPIRATION RATE: 18 BRPM | OXYGEN SATURATION: 98 % | SYSTOLIC BLOOD PRESSURE: 149 MMHG | HEART RATE: 72 BPM | DIASTOLIC BLOOD PRESSURE: 95 MMHG

## 2024-10-08 DIAGNOSIS — I10 SEVERE HYPERTENSION: ICD-10-CM

## 2024-10-08 DIAGNOSIS — O10.919 CHRONIC HYPERTENSION AFFECTING PREGNANCY: ICD-10-CM

## 2024-10-08 LAB
ALBUMIN MFR UR ELPH: 12.5 MG/DL
ALBUMIN SERPL BCG-MCNC: 4.2 G/DL (ref 3.5–5.2)
ALP SERPL-CCNC: 87 U/L (ref 40–150)
ALT SERPL W P-5'-P-CCNC: 15 U/L (ref 0–50)
ANION GAP SERPL CALCULATED.3IONS-SCNC: 10 MMOL/L (ref 7–15)
AST SERPL W P-5'-P-CCNC: 24 U/L (ref 0–45)
BILIRUB SERPL-MCNC: 0.7 MG/DL
BUN SERPL-MCNC: 15.1 MG/DL (ref 6–20)
CALCIUM SERPL-MCNC: 9.2 MG/DL (ref 8.8–10.4)
CHLORIDE SERPL-SCNC: 107 MMOL/L (ref 98–107)
CREAT SERPL-MCNC: 0.58 MG/DL (ref 0.51–0.95)
CREAT UR-MCNC: 115.8 MG/DL
EGFRCR SERPLBLD CKD-EPI 2021: >90 ML/MIN/1.73M2
ERYTHROCYTE [DISTWIDTH] IN BLOOD BY AUTOMATED COUNT: 14.4 % (ref 10–15)
GLUCOSE SERPL-MCNC: 72 MG/DL (ref 70–99)
HCO3 SERPL-SCNC: 25 MMOL/L (ref 22–29)
HCT VFR BLD AUTO: 39.3 % (ref 35–47)
HGB BLD-MCNC: 12.3 G/DL (ref 11.7–15.7)
HOLD SPECIMEN: NORMAL
MCH RBC QN AUTO: 29.4 PG (ref 26.5–33)
MCHC RBC AUTO-ENTMCNC: 31.3 G/DL (ref 31.5–36.5)
MCV RBC AUTO: 94 FL (ref 78–100)
PLATELET # BLD AUTO: 384 10E3/UL (ref 150–450)
POTASSIUM SERPL-SCNC: 4.4 MMOL/L (ref 3.4–5.3)
PROT SERPL-MCNC: 7 G/DL (ref 6.4–8.3)
PROT/CREAT 24H UR: 0.11 MG/MG CR (ref 0–0.2)
RBC # BLD AUTO: 4.18 10E6/UL (ref 3.8–5.2)
SODIUM SERPL-SCNC: 142 MMOL/L (ref 135–145)
WBC # BLD AUTO: 4.8 10E3/UL (ref 4–11)

## 2024-10-08 PROCEDURE — 85027 COMPLETE CBC AUTOMATED: CPT

## 2024-10-08 PROCEDURE — G0463 HOSPITAL OUTPT CLINIC VISIT: HCPCS

## 2024-10-08 PROCEDURE — 84156 ASSAY OF PROTEIN URINE: CPT

## 2024-10-08 PROCEDURE — 80053 COMPREHEN METABOLIC PANEL: CPT

## 2024-10-08 PROCEDURE — 36415 COLL VENOUS BLD VENIPUNCTURE: CPT

## 2024-10-08 PROCEDURE — 99214 OFFICE O/P EST MOD 30 MIN: CPT

## 2024-10-08 RX ORDER — LABETALOL 200 MG/1
400 TABLET, FILM COATED ORAL 3 TIMES DAILY
Qty: 180 TABLET | Refills: 2 | Status: SHIPPED | OUTPATIENT
Start: 2024-10-08 | End: 2024-10-11

## 2024-10-08 RX ORDER — NIFEDIPINE 10 MG/1
10 CAPSULE ORAL ONCE
Qty: 1 CAPSULE | Refills: 0 | Status: SHIPPED | OUTPATIENT
Start: 2024-10-08 | End: 2024-10-08

## 2024-10-08 RX ORDER — LABETALOL 200 MG/1
200 TABLET, FILM COATED ORAL 3 TIMES DAILY
Qty: 180 TABLET | Refills: 2 | Status: SHIPPED | OUTPATIENT
Start: 2024-10-08 | End: 2024-10-08

## 2024-10-08 NOTE — TELEPHONE ENCOUNTER
Home Observation of Postpartum Elevated BP (HOPE-BP) Program     Murali Norwood enrolled in the HOPE-BP Program on 9/13/2024, 25 days ago. Delivered on      Briseyda Rogel [[id number]]   9/11/2024      Emilee Rogel [[id number]]   9/11/2024      Eder Rogel [[id number]]   9/11/2024 . Diagnosis: Chronic hypertension. Medication(s) prescribed: Labetalol.  Patient reported the following blood pressures in the past 72 hours:       9/14/2024 9/20/2024 9/21/2024 9/25/2024 9/27/2024 10/1/2024   [facility] Health Trends BP Review Flowsheet   Systolic (Patient Reported) 122 146 154    175 158 141 137   Diastolic (Patient Reported) 73 88 98    99 97 82 90   OBHTN Symptom  Answer   Very bad headache          Multiple values from one day are sorted in reverse-chronological order     Write called and spoke with patient to check in as no blood pressure readings have been entered. Patient reports she is feeling well overall. She reports she is still taking her Labetalol 400mg TID. Reports her blood pressure today was 136/87. She denied: chest pain, difficulty breathing or shortness of breath, right upper quadrant pain, unsteady gait, weakness, intractable headache, blurred vision, dizziness, angioedema or lip swelling, leg edema, difficulty swallowing, palpitations, rash, acute joint redness or pain (gout), and symptomatic hypotension. Patient is scheduled this afternoon for a 2wk PPV, which she states she will be coming to. Encouraged patient to continue to check her BP twice a day and [facility] and take her medications as prescribed, patient verbalized understanding.

## 2024-10-08 NOTE — PROGRESS NOTES
MFM-Postpartum visit  Clinic Progress Note    Murali Norwood  : 1980  MRN: [id number]    Chief Complaint: Postpartum visit    History of Present Illness:  Murali Norwood is a 44 year old, now  female who present for a 3 week postpartum visit.    Pregnancy was c/b:  - triplet gestation (tri tri)  - cHTN  - History of fatty liver disease  - History of myomectomy  - Advanced maternal age  - Fetal growth restriction of fetus 3 with normal doppler  - Postpartum hemorrhage resulting in acute blood loss anemia requiring blood transfusion    She was delivery via c/s at 34w4d for the above co-morbidities. See discharge summary for details.    Upon discharge she was enrolled in the [place]-BP program and her medications have been titrated up to 400mg of Labetalol TID. She has not taken her medications since yesterday afternoon, however and has been intermittent with inputting her BP values into [facility]. She endorses occasional headaches that resolve with tylenol and ibuprofen. She also reports blurry vision ongoing for 1 week. She has some upper abdominal tenderness, more on the left than right. She also is still quite tender near her incision. Her incision was checked on  and was noted to be erythematous with yeasty odor and a 3x4 seroma on right upper most aspect of incision. She reports that it feels to have improved since that time, but still notes an area of swelling and tenderness on the lateral aspects of it.     Bleeding is mild. Denies saturating a pad in 1 hour or passage of clots.    Mood is doing well, she reports increased support from her Bahai and community members. Her older children plan to visit from the  in November.      She is breastfeeding and supplementing with formula, which is going well. Denies any pain or concerns with her breasts at this time.    For contraception, she underwent bilateral tubal sterilization.        Obstetric History:  OB History    Para  Term  AB Living   3 3 2 1 0 5   SAB IAB Ectopic Multiple Live Births   0 0 0 1 5      # Outcome Date GA Lbr Lauro/2nd Weight Sex Type Anes PTL Lv   3A  24 34w4d  1.87 kg (4 lb 2 oz) M CS-LTranv Spinal N PERRY      Name: Briseyda Rogel      Apgar1: 5  Apgar5: 9   3B  24 34w4d  1.98 kg (4 lb 5.8 oz) M CS-LTranv Spinal N PERRY      Name: Bridget VASQUEZ Makinde      Apgar1: 8  Apgar5: 9   3C  24 34w4d  1.78 kg (3 lb 14.8 oz) F CS-LTranv Spinal N PERRY      Name: Eder Lowryinde      Apgar1: 7  Apgar5: 8   2 Term            1 Term                ROS:  A 10 point review of systems was conducted and negative except as noted in HPI    Problem List:  Patient Active Problem List   Diagnosis    Dizziness    Epigastric abdominal pain    Flank pain    Triplet pregnancy, trichorionic/triamniotic, third trimester    AMA (advanced maternal age) multigravida 35+    Late prenatal care    Chronic hypertension    H/O: myomectomy    S/P  section    Chronic hypertension affecting pregnancy       Exam:  Initial BP checks in the severe range x 3. Immediate release Nifedipine 10mg given and BP responded appropriately.   BP (!) 149/95   Pulse 72   Resp 18   SpO2 98%     Constitutional: alert, oriented, NAD  Respiratory: breathing unlabored, no SOB  CV: well-perfused  Abdomen: soft, mild tenderness on lateral aspects of incision. No erythema or s/s of yeast infection noted. Seroma present on right aspect of incision, measuring about 2x3cm.  Pelvic: deferred  Psych: mood WNL, behavior WNL        Assessment/Plan:  44 year old  here for her postpartum visit.      Routine PP:  - Lochia as expected.  - Seroma appears to be smaller than previously examined and resolution of skin yeast infection at this time. Discussed given this more time to continue to decrease in size and tenderness.   - Contraception: bilateral tubal sterilization at the time of c/s.  - No records for pap, but [place]. Will review with patient at her 6 week PP visit and can offer collection if she is not up to date.    cHTN:  - Initial BPs in severe range and some additional s/s of preeclampsia (blurry vision, upper abdominal tenderness). Patient had not taken BP medications since yesterday afternoon. Discussed that these are typically symptoms that would warrant evaluation in the hospital, however patient declines this as she has her triplets with her in clinic.   - 10mg of immediate release nifedipine given with appropriate response in BP to mild-range. Patient is agreeable to collection of labs outpatient. Labs pending.   - Refill prescription of 400mg Labetalol TID to clinic pharmacy and given to patient prior to leaving clinic.   - Discussed importance of taking her medication q8 hours and encouraged consistent participation in HOPE-BP program to help best manage her BP. Patient is agreeable to these recommendations. Will plan to call Misbah tomorrow to review labs and check in.    RTC in 3 weeks for 6 weeks PP visit.    45 minutes spent on the date of the encounter, doing chart review, history and exam, documentation and further activities as noted.      Diana Estrella CNM on 10/8/2024 at 3:30 PM

## 2024-10-08 NOTE — NURSING NOTE
Murali here for 4 week postpartum visit following C/S delivery at 34w4d gestation for pregnancy complicated by tri/tri triplets and cHTN. Scheduled PTD. Evaluated for pain which patient reports is well managed controlled with current regimen of Tylenol and Ibuprofen. Evaluated bleeding, reports lochia is scant and without foul odor. Evaluated incision C/D/I with small amount of swelling, well approximated without redness, tenderness, or drainage. Breastfeeding/pumping/bottle feeding yes. Evaluated breast/nipple status: no concerns. Evaluated bowel/bladder status: no concerns. EPDS deferred to 6 wk due to severe range BP. Pt states she is doing well and has not depression concerns. Evaluated Bps 184/106, 167/100, 163/93. Pt exscorted to outpatient lab for Pre-e labs. 10mg IR Nifedipine given at 1617. Pt states she has had blurry vision for one week and has some soreness in upper abdomen. Pt states she took her Labetalol dose yesterday evening but had missed her afternoon dose and both doses today. Plan for birth control PPTL at time of delivery. Diana MONTANO met with patient. Plan for Pre- labs now, serial BP, will make follow up plan when labs result tomorrow.     Pt came down to 145/95 in clinic, plan for pt to go home now, and take next labetalol dose at 2100. Explained importance of consistent medication schedule. Pt voiced understanding.

## 2024-10-09 ENCOUNTER — TELEPHONE (OUTPATIENT)
Dept: MATERNAL FETAL MEDICINE | Facility: CLINIC | Age: 44
End: 2024-10-09
Payer: COMMERCIAL

## 2024-10-09 NOTE — TELEPHONE ENCOUNTER
Home Observation of Postpartum Elevated BP (HOPE-BP) Program     Murali Norwood enrolled in the HOPE-BP Program on 9/13/2024, 26 days ago. Delivered on      Briseyda Rogel [[id number]]   9/11/2024      Emilee Rogel [[id number]]   9/11/2024      Eder Rogel [[id number]]   9/11/2024 . Diagnosis: Chronic hypertension. Medication(s) prescribed: Labetalol.  Patient reported the following blood pressures in the past 72 hours:       9/14/2024 9/20/2024 9/21/2024 9/25/2024 9/27/2024 10/1/2024 10/9/2024   [facility] Health Trends BP Review Flowsheet   Systolic (Patient Reported) 122 146 154    175 158 141 137 143   Diastolic (Patient Reported) 73 88 98    99 97 82 90 90   OBHTN Symptom  Answer   Very bad headache           Multiple values from one day are sorted in reverse-chronological order     Writer called and spoke with patient. Called to follow up from yesterday (see PPV note from CHARMAINE Ortiz). Patient reports when she got home she did take a dose of her Labetalol 400mg (one dose yesterday), states this morning at 0800 she took her first dose. She has her alarm set to take her next dose at 1600. Plan for patient to check her BP again around 1500, discussed if still elevated plan will be to increase her Labetalol (discussed with CHARMAINE Ortiz), patient verbalizes understanding. Patient denied: chest pain, difficulty breathing or shortness of breath, right upper quadrant pain, unsteady gait, weakness, intractable headache, blurred vision, dizziness, angioedema or lip swelling, leg edema, difficulty swallowing, palpitations, rash, acute joint redness or pain (gout), and symptomatic hypotension.

## 2024-10-10 ENCOUNTER — TELEPHONE (OUTPATIENT)
Dept: MATERNAL FETAL MEDICINE | Facility: CLINIC | Age: 44
End: 2024-10-10
Payer: COMMERCIAL

## 2024-10-10 NOTE — TELEPHONE ENCOUNTER
Home Observation of Postpartum Elevated BP (HOPE-BP) Program     Murali Norwood enrolled in the HOPE-BP Program on 9/13/2024, 27 days ago. Delivered on      Briseyda Rogel [[id number]]   9/11/2024      Emilee Rogel [[id number]]   9/11/2024      Eder Rogel [[id number]]   9/11/2024 . Diagnosis: Chronic hypertension. Medication(s) prescribed: Labetalol.  Patient reported the following blood pressures in the past 72 hours:       9/20/2024 9/21/2024 9/25/2024 9/27/2024 10/1/2024 10/9/2024 10/10/2024   [facility] Health Trends BP Review Flowsheet   Systolic (Patient Reported) 146 154    175 158 141 137 138    143 146   Diastolic (Patient Reported) 88 98    99 97 82 90 97    90 97   OBHTN Symptom  Answer  Very bad headache            Multiple values from one day are sorted in reverse-chronological order     Left message for patient to call [place] [facility] RN regarding elevated BP and to review medications.    PATITO LANDEROS, RN

## 2024-10-11 ENCOUNTER — TELEPHONE (OUTPATIENT)
Dept: MATERNAL FETAL MEDICINE | Facility: CLINIC | Age: 44
End: 2024-10-11
Payer: COMMERCIAL

## 2024-10-11 DIAGNOSIS — I10 CHRONIC HYPERTENSION: ICD-10-CM

## 2024-10-11 DIAGNOSIS — O09.30 LATE PRENATAL CARE: Primary | ICD-10-CM

## 2024-10-11 RX ORDER — LABETALOL 200 MG/1
600 TABLET, FILM COATED ORAL 3 TIMES DAILY
Qty: 180 TABLET | Refills: 2 | Status: SHIPPED | OUTPATIENT
Start: 2024-10-11 | End: 2024-10-18 | Stop reason: DRUGHIGH

## 2024-10-11 NOTE — TELEPHONE ENCOUNTER
Home Observation of Postpartum Elevated BP (HOPE-BP) Program     Murali Norwood enrolled in the HOPE-BP Program on 9/13/2024, 28 days ago. Delivered on      Briseyda Rogel [[id number]]   9/11/2024      Emilee Rogel [[id number]]   9/11/2024      Eder Rogel [[id number]]   9/11/2024 . Diagnosis: Chronic hypertension. Medication(s) prescribed: Labetalol.  Patient reported the following blood pressures in the past 72 hours:       9/21/2024 9/25/2024 9/27/2024 10/1/2024 10/9/2024 10/10/2024 10/11/2024   [facility] Health Trends BP Review Flowsheet   Systolic (Patient Reported) 154    175 158 141 137 138    143 146 146   Diastolic (Patient Reported) 98    99 97 82 90 97    90 97 97   OBHTN Symptom  Answer Very bad headache             Multiple values from one day are sorted in reverse-chronological order     Writer called and spoke with patient. Called to follow up on recent blood pressure readings, and medications. Patient reports she is taking Labetalol 400mg TID, she did get all three doses in yesterday. She took her first dose this AM at 0800. She denied: chest pain, difficulty breathing or shortness of breath, right upper quadrant pain, unsteady gait, weakness, intractable headache, blurred vision, dizziness, angioedema or lip swelling, leg edema, difficulty swallowing, palpitations, rash, acute joint redness or pain (gout), and symptomatic hypotension. Using our SO recommended patient increase to 600mg TID, with taking an additional 200mg now to total 600mg and resume with her subsequent doses. Patient agreeable and verbalized understanding. New rx sent to [facility] in [place] per patient request.

## 2024-10-18 ENCOUNTER — ANCILLARY PROCEDURE (OUTPATIENT)
Dept: GENERAL RADIOLOGY | Facility: CLINIC | Age: 44
End: 2024-10-18
Payer: COMMERCIAL

## 2024-10-18 ENCOUNTER — OFFICE VISIT (OUTPATIENT)
Dept: FAMILY MEDICINE | Facility: CLINIC | Age: 44
End: 2024-10-18
Payer: COMMERCIAL

## 2024-10-18 ENCOUNTER — TELEPHONE (OUTPATIENT)
Dept: OBGYN | Facility: CLINIC | Age: 44
End: 2024-10-18

## 2024-10-18 VITALS
WEIGHT: 177 LBS | RESPIRATION RATE: 15 BRPM | BODY MASS INDEX: 32.57 KG/M2 | HEART RATE: 66 BPM | OXYGEN SATURATION: 99 % | TEMPERATURE: 97 F | DIASTOLIC BLOOD PRESSURE: 85 MMHG | SYSTOLIC BLOOD PRESSURE: 139 MMHG | HEIGHT: 62 IN

## 2024-10-18 DIAGNOSIS — M79.642 BILATERAL HAND PAIN: Primary | ICD-10-CM

## 2024-10-18 DIAGNOSIS — M79.642 BILATERAL HAND PAIN: ICD-10-CM

## 2024-10-18 DIAGNOSIS — M79.641 BILATERAL HAND PAIN: ICD-10-CM

## 2024-10-18 DIAGNOSIS — M79.641 BILATERAL HAND PAIN: Primary | ICD-10-CM

## 2024-10-18 LAB
ANION GAP SERPL CALCULATED.3IONS-SCNC: 11 MMOL/L (ref 7–15)
BASOPHILS # BLD AUTO: 0 10E3/UL (ref 0–0.2)
BASOPHILS NFR BLD AUTO: 1 %
BUN SERPL-MCNC: 10.9 MG/DL (ref 6–20)
CALCIUM SERPL-MCNC: 9 MG/DL (ref 8.8–10.4)
CHLORIDE SERPL-SCNC: 104 MMOL/L (ref 98–107)
CREAT SERPL-MCNC: 0.7 MG/DL (ref 0.51–0.95)
EGFRCR SERPLBLD CKD-EPI 2021: >90 ML/MIN/1.73M2
EOSINOPHIL # BLD AUTO: 0.1 10E3/UL (ref 0–0.7)
EOSINOPHIL NFR BLD AUTO: 3 %
ERYTHROCYTE [DISTWIDTH] IN BLOOD BY AUTOMATED COUNT: 14 % (ref 10–15)
ERYTHROCYTE [SEDIMENTATION RATE] IN BLOOD BY WESTERGREN METHOD: 8 MM/HR (ref 0–20)
GLUCOSE SERPL-MCNC: 86 MG/DL (ref 70–99)
HCO3 SERPL-SCNC: 25 MMOL/L (ref 22–29)
HCT VFR BLD AUTO: 40.2 % (ref 35–47)
HGB BLD-MCNC: 12.2 G/DL (ref 11.7–15.7)
IMM GRANULOCYTES # BLD: 0 10E3/UL
IMM GRANULOCYTES NFR BLD: 0 %
LYMPHOCYTES # BLD AUTO: 1.6 10E3/UL (ref 0.8–5.3)
LYMPHOCYTES NFR BLD AUTO: 37 %
MCH RBC QN AUTO: 27.8 PG (ref 26.5–33)
MCHC RBC AUTO-ENTMCNC: 30.3 G/DL (ref 31.5–36.5)
MCV RBC AUTO: 92 FL (ref 78–100)
MONOCYTES # BLD AUTO: 0.2 10E3/UL (ref 0–1.3)
MONOCYTES NFR BLD AUTO: 6 %
NEUTROPHILS # BLD AUTO: 2.3 10E3/UL (ref 1.6–8.3)
NEUTROPHILS NFR BLD AUTO: 54 %
PLATELET # BLD AUTO: 230 10E3/UL (ref 150–450)
POTASSIUM SERPL-SCNC: 4 MMOL/L (ref 3.4–5.3)
RBC # BLD AUTO: 4.39 10E6/UL (ref 3.8–5.2)
RHEUMATOID FACT SERPL-ACNC: <10 IU/ML
SODIUM SERPL-SCNC: 140 MMOL/L (ref 135–145)
WBC # BLD AUTO: 4.2 10E3/UL (ref 4–11)

## 2024-10-18 PROCEDURE — 86431 RHEUMATOID FACTOR QUANT: CPT

## 2024-10-18 PROCEDURE — 99214 OFFICE O/P EST MOD 30 MIN: CPT

## 2024-10-18 PROCEDURE — 85652 RBC SED RATE AUTOMATED: CPT

## 2024-10-18 PROCEDURE — 36415 COLL VENOUS BLD VENIPUNCTURE: CPT

## 2024-10-18 PROCEDURE — 73110 X-RAY EXAM OF WRIST: CPT | Mod: TC | Performed by: RADIOLOGY

## 2024-10-18 PROCEDURE — 80048 BASIC METABOLIC PNL TOTAL CA: CPT

## 2024-10-18 PROCEDURE — 85025 COMPLETE CBC W/AUTO DIFF WBC: CPT

## 2024-10-18 PROCEDURE — 73130 X-RAY EXAM OF HAND: CPT | Mod: TC | Performed by: RADIOLOGY

## 2024-10-18 PROCEDURE — 86038 ANTINUCLEAR ANTIBODIES: CPT

## 2024-10-18 RX ORDER — LABETALOL 300 MG/1
300 TABLET, FILM COATED ORAL 2 TIMES DAILY
COMMUNITY

## 2024-10-18 RX ORDER — ACETAMINOPHEN 325 MG/1
325-650 TABLET ORAL EVERY 6 HOURS PRN
Qty: 60 TABLET | Refills: 0 | Status: SHIPPED | OUTPATIENT
Start: 2024-10-18

## 2024-10-18 ASSESSMENT — PAIN SCALES - GENERAL: PAINLEVEL: MODERATE PAIN (5)

## 2024-10-18 NOTE — TELEPHONE ENCOUNTER
LVM for patient to call back on HOPE BP line to discuss her recent blood pressures and medications.

## 2024-10-18 NOTE — PATIENT INSTRUCTIONS
We will do some lab work and x-rays today to see if we can determine what might be causing your ongoing discomfort.  It is most likely that there is some inflammation that is potentially leading to an impingement of the nerves in those areas, but we will allow for some of the labs and x-ray imaging to determine very specific next steps.  I have listed some options for comfort management below as well as follow-up recommendations.    -Tylenol 325-650mg every 4-6 hours  -Volteran Gel topically in the area  -Ice or heat in the area for 20 minutes at a time    I have attached some exercises to your after visit summary that I would like you to try out for pain relief.  It is important that you continue on with your normal daily activity to reduce the risk for overcompensation injury or atrophy.  Please avoid overexertion, or activity that seems to severely worsen the pain, but engaging in your normal activity at a slower pace than usual will help to continue improvement in strength.    Please seek immediate medical attention with symptoms including severe pain or swelling, loss of strength or sensation, fever, chills, body aches, nausea and vomiting, or inability to move the affected extremity

## 2024-10-18 NOTE — PROGRESS NOTES
Assessment & Plan     (M79.641,  M79.642) Bilateral hand pain  (primary encounter diagnosis)  Comment: Subacute without improvement.  Difficult to determine specific cause of pain at this time.  Vital signs stable, no concerns for septic joint, no findings that would warrant the need for immediate medical attention.  Differential diagnoses include rheumatoid arthritis versus osteoarthritis versus ligamentous injury versus nerve impingement versus minor strain.  Considering presence of discomfort that began shortly after delivery, and bilateral nature of discomfort, rheumatoid arthritis of course cannot be completely ruled out, especially because she is describing pain as though it is warm and boggy.  Osteoarthritis may also be possible with overuse and exertion, so assessing x-rays today for further determination.  Less likely for ligamentous injury considering that patient declines known injury, and bilateral nature of this would be a bit more uncommon.  Some possibility for nerve impingement related to burning and stabbing type pain.  Would like her to begin with more conservative pain management at this time, and will of course update plan of care based on lab and imaging results, and response to current plan of care. Offered education on medications including appropriate dosing, possible side effects, and possible adverse effects.  Education given on return to clinic instructions as well as alarm signs that would require the need for immediate medical attention.  Patient attested to understanding.  Plan: Rheumatoid factor, Anti Nuclear Jocelin IgG by IFA         with Reflex, XR Hand Bilateral G/E 3 Views, XR         Wrist Bilateral G/E 3 Views, diclofenac         (VOLTAREN) 1 % topical gel, acetaminophen         (TYLENOL) 325 MG tablet, CBC with platelets and        differential, Basic metabolic panel  (Ca, Cl,         CO2, Creat, Gluc, K, Na, BUN), ESR: Erythrocyte        sedimentation rate       This progress  note has been dictated, with use of voice recognition software. Any grammatical, typographical, or context errors are unintentional and inherent to use of voice recognition software.     Ordering of each unique test  I spent a total of 20 minutes on the day of the visit.   Time spent by me doing chart review, history and exam, documentation and further activities per the note      FUTURE APPOINTMENTS:       - Follow-up visit in 2 to 4 weeks with PCP if symptoms worsen or do not improve       - Follow-up for annual visit or as needed  Patient Instructions   We will do some lab work and x-rays today to see if we can determine what might be causing your ongoing discomfort.  It is most likely that there is some inflammation that is potentially leading to an impingement of the nerves in those areas, but we will allow for some of the labs and x-ray imaging to determine very specific next steps.  I have listed some options for comfort management below as well as follow-up recommendations.    -Tylenol 325-650mg every 4-6 hours  -Volteran Gel topically in the area  -Ice or heat in the area for 20 minutes at a time    I have attached some exercises to your after visit summary that I would like you to try out for pain relief.  It is important that you continue on with your normal daily activity to reduce the risk for overcompensation injury or atrophy.  Please avoid overexertion, or activity that seems to severely worsen the pain, but engaging in your normal activity at a slower pace than usual will help to continue improvement in strength.    Please seek immediate medical attention with symptoms including severe pain or swelling, loss of strength or sensation, fever, chills, body aches, nausea and vomiting, or inability to move the affected extremity              Matt Crawley is a 44 year old, presenting for the following health issues:  Consult (Pain in both hands-top part the hand for 1 month since she gave  birth.//)        10/18/2024     1:10 PM   Additional Questions   Roomed by Laura BUCHANAN   Accompanied by  and 3 newborns     History of Present Illness       Reason for visit:  Hand pain  Symptom onset:  More than a month  Symptoms include:  Hand pain  Symptom intensity:  Moderate  Symptom progression:  Staying the same  Had these symptoms before:  No  What makes it worse:  No  What makes it better:  No   She is taking medications regularly.  Misbah is a 44-year-old female with a past medical history significant for epigastric discomfort, triplet pregnancy with delivery 1 month ago, chronic hypertension, and history of  section who presents today regarding bilateral hand pain.  Patient reports that hand pain began shortly after she delivered her triplets, so this has been occurring for about a month.  She notes that pain is localized to the lateral aspect of the base of both thumbs, and extends into the medial wrist bilaterally, but does not span more proximal or distal to this.  She notes that the remainder of her fingers on both hands are completely pain-free.  She notes a mild amount of swelling and a little bit of warmth in the area, but declines any loss of range of motion, numbness, or weakness in her hands.  She declines any known trauma or injury to the area.  She notes that the pain is constant and a dull aching in those areas, but she does have episodes of acutely worsening pain that she describes as tingling and nerve type pain.  She has applied Vicks, utilized ice, and tried to rest her hands, but none of these have helped to relieve her pain.  She feels well otherwise, and declines any fever, chills, body aches, lightheadedness or dizziness, blurry or double vision, chest pain, shortness of breath, or pain that radiates up her arms      Review of Systems  Constitutional, HEENT, cardiovascular, pulmonary, gi and gu systems are negative, except as otherwise noted.      Objective    /85    "Pulse 66   Temp 97  F (36.1  C) (Tympanic)   Resp 15   Ht 1.575 m (5' 2.01\")   Wt 80.3 kg (177 lb)   SpO2 99%   Breastfeeding Yes   BMI 32.37 kg/m    Body mass index is 32.37 kg/m .  Physical Exam   GENERAL: alert and no distress  NECK: no adenopathy, no asymmetry, masses, or scars  RESP: lungs clear to auscultation - no rales, rhonchi or wheezes  CV: regular rate and rhythm, normal S1 S2, no S3 or S4, no murmur, click or rub, no peripheral edema  ABDOMEN: soft, nontender, no hepatosplenomegaly, no masses and bowel sounds normal  MS: no gross musculoskeletal defects noted, no edema.  Erythema, range of motion intact in bilateral wrists and fingers equally, strength mildly reduced to flexion of thumbs bilaterally, but intact in all of the fingers.  Range of motion reproduced to flexion and extension of wrist bilaterally, and lateral plane movement of wrist bilaterally.  Fine motor dexterity intact in bilateral hands    Results for orders placed or performed in visit on 10/18/24 (from the past 24 hour(s))   CBC with platelets and differential    Narrative    The following orders were created for panel order CBC with platelets and differential.  Procedure                               Abnormality         Status                     ---------                               -----------         ------                     CBC with platelets and d...[[id number]]  Abnormal            Final result                 Please view results for these tests on the individual orders.   CBC with platelets and differential   Result Value Ref Range    WBC Count 4.2 4.0 - 11.0 10e3/uL    RBC Count 4.39 3.80 - 5.20 10e6/uL    Hemoglobin 12.2 11.7 - 15.7 g/dL    Hematocrit 40.2 35.0 - 47.0 %    MCV 92 78 - 100 fL    MCH 27.8 26.5 - 33.0 pg    MCHC 30.3 (L) 31.5 - 36.5 g/dL    RDW 14.0 10.0 - 15.0 %    Platelet Count 230 150 - 450 10e3/uL    % Neutrophils 54 %    % Lymphocytes 37 %    % Monocytes 6 %    % Eosinophils 3 %    % Basophils 1 " %    % Immature Granulocytes 0 %    Absolute Neutrophils 2.3 1.6 - 8.3 10e3/uL    Absolute Lymphocytes 1.6 0.8 - 5.3 10e3/uL    Absolute Monocytes 0.2 0.0 - 1.3 10e3/uL    Absolute Eosinophils 0.1 0.0 - 0.7 10e3/uL    Absolute Basophils 0.0 0.0 - 0.2 10e3/uL    Absolute Immature Granulocytes 0.0 <=0.4 10e3/uL       Susanna Lu DNP FNP-C  Family Nurse Practitioner - Same Day Provider  [facility] [place]    Signed Electronically by: ANI Umaña CNP

## 2024-10-21 LAB — ANA SER QL IF: NEGATIVE

## 2024-10-29 ENCOUNTER — OFFICE VISIT (OUTPATIENT)
Dept: MATERNAL FETAL MEDICINE | Facility: CLINIC | Age: 44
End: 2024-10-29
Attending: ADVANCED PRACTICE MIDWIFE
Payer: COMMERCIAL

## 2024-10-29 VITALS
HEART RATE: 97 BPM | DIASTOLIC BLOOD PRESSURE: 81 MMHG | OXYGEN SATURATION: 99 % | SYSTOLIC BLOOD PRESSURE: 125 MMHG | RESPIRATION RATE: 20 BRPM

## 2024-10-29 DIAGNOSIS — G56.03 BILATERAL CARPAL TUNNEL SYNDROME: ICD-10-CM

## 2024-10-29 PROCEDURE — G0463 HOSPITAL OUTPT CLINIC VISIT: HCPCS | Performed by: ADVANCED PRACTICE MIDWIFE

## 2024-10-29 ASSESSMENT — PAIN SCALES - GENERAL: PAINLEVEL_OUTOF10: NO PAIN (0)

## 2024-10-29 NOTE — PROGRESS NOTES
MFM-Postpartum visit  Clinic Progress Note    Murali Norwood  : 1980  MRN: [id number]    Chief Complaint: Postpartum visit    History of Present Illness:  Murali Norwood is a 44 year old, now  female who present for a 6 week postpartum visit.    Pregnancy was c/b:  - triplet gestation (tri tri)  - cHTN  - History of fatty liver disease  - History of myomectomy  - Advanced maternal age  - Fetal growth restriction of fetus 3 with normal doppler  - Postpartum hemorrhage resulting in acute blood loss anemia requiring blood transfusion     She was delivery via c/s at 34w4d for the above co-morbidities. See discharge summary for details.    Since her last postpartum visit, she continues with 600mg of labetalol TID and this has been working well for BP control. She does not have a primary care provider since moving from the  and is interested in finding someone at a clinic near her home for ongoing management of her cHTN.    Bleeding is scant and irregular. She reports most days she has no bleeding, but still has a day with mild spotting every so often. Denies any ongoing post-operative pain at this time.     She is breastfeeding and supplementing babies and this is going well. Has no concerns with her breasts at this time.     Mood is stable, denies any feelings of depression or hopelessness.      For contraception, she is underwent bilateral salpingectomy.      She does endorse occasional wrist hand pain, which is accompanied by numbness and tingling of thumbs and first fingers.      Obstetric History:  OB History    Para Term  AB Living   3 3 2 1 0 5   SAB IAB Ectopic Multiple Live Births   0 0 0 1 5      # Outcome Date GA Lbr Lauro/2nd Weight Sex Type Anes PTL Lv   3A  24 34w4d  1.87 kg (4 lb 2 oz) M CS-LTranv Spinal N PERRY      Name: Briseyda Rogel      Apgar1: 5  Apgar5: 9   3B  24 34w4d  1.98 kg (4 lb 5.8 oz) M CS-LTranv Spinal N PERRY       Name: Bridget Rogel      Apgar1: 8  Apgar5: 9   3C  24 34w4d  1.78 kg (3 lb 14.8 oz) F CS-LTranv Spinal N PERRY      Name: Eder Rogel      Apgar1: 7  Apgar5: 8   2 Term            1 Term                ROS:  A 10 point review of systems was conducted and negative except as noted in HPI    Problem List:  Patient Active Problem List   Diagnosis    Dizziness    Epigastric abdominal pain    Flank pain    Triplet pregnancy, trichorionic/triamniotic, third trimester    AMA (advanced maternal age) multigravida 35+    Late prenatal care    Chronic hypertension    H/O: myomectomy    S/P  section    Chronic hypertension affecting pregnancy       Exam:  /81 (BP Location: Left arm)   Constitutional: alert, oriented, NAD  Respiratory: breathing unlabored, no SOB  CV: well-perfused  Abdomen: soft, non-tender. Incision healed  Pelvic: deferred  Psych: mood WNL, behavior WNL        Assessment/Plan:  44 year old  here for her postpartum visit.  She is doing well and considered healed from recent pregnancy and birth.    - Lochia as expected. This should completely resolve shortly and reviewed this with Misbah.   - Discussed that lactation may cause a delay in resumption of menses, but this could also return at any point.   - Contraception: bilateral salpingectomy  - Pap: no records on file, but patient thinks this may have occurred recently in the [place]. Recommend patient obtain outside records and if unable to find them, then would be due for collection when she establishes with primary care.   - Phone number for primary care provider at local clinic provided. Recommend that she make appointment soon for ongoing management of cHTN. Will not make adjustments to medications at this time as she continues to breastfeed.  - Bilateral wrist braces for likely carpal tunnel syndrome provided. Discussed that this will hopefully resolve further away from delivery, but reviewed this could be an  ongoing issue. Braces for wrist stabilization and if symptoms persist, would recommend orthopedic referral.    30 minutes spent on the date of the encounter, doing chart review, history and exam, documentation and further activities as noted.      Diana Estrella CNM on 10/29/2024 at 3:51 PM

## 2024-10-29 NOTE — NURSING NOTE
"Omobolanle \"Misbah\" here for 6 week postpartum visit following C/S delivery at 34w4d gestation for pregnancy complicated by tri/tri triplets. Evaluated for pain which patient reports is well controlled with current regimen of Tylenol and Ibuprofen. Pt reports some bilat carpal tunnel pain.  Evaluated bleeding, reports lochia is without foul odor. Evaluated for perineal healing WNL. Evaluated incision and was dry and intact, well approximated without redness, tenderness, or drainage. Breastfeeding/pumping/bottle feeding for triplets. Evaluated bowel/bladder status WNL.. Mood evaluated and pt reports no anxiety or depression and declines any intervention at this time. Evaluated BP's and are WNL. Plan for birth control is BTL. ARI Estrella met with patient. Plan for pt to follow up with primary care.    "

## 2024-10-30 ENCOUNTER — DOCUMENTATION ONLY (OUTPATIENT)
Dept: OBGYN | Facility: CLINIC | Age: 44
End: 2024-10-30
Payer: COMMERCIAL

## 2024-10-30 DIAGNOSIS — I10 HYPERTENSION, ESSENTIAL: Primary | ICD-10-CM

## 2024-10-31 ENCOUNTER — DOCUMENTATION ONLY (OUTPATIENT)
Dept: MATERNAL FETAL MEDICINE | Facility: CLINIC | Age: 44
End: 2024-10-31
Payer: COMMERCIAL

## 2024-11-19 LAB
PATH REPORT.COMMENTS IMP SPEC: NORMAL
PATH REPORT.COMMENTS IMP SPEC: NORMAL
PATH REPORT.FINAL DX SPEC: NORMAL
PATH REPORT.GROSS SPEC: NORMAL
PATH REPORT.MICROSCOPIC SPEC OTHER STN: NORMAL
PATH REPORT.RELEVANT HX SPEC: NORMAL
PHOTO IMAGE: NORMAL

## 2024-12-06 ENCOUNTER — TELEPHONE (OUTPATIENT)
Dept: OBGYN | Facility: CLINIC | Age: 44
End: 2024-12-06
Payer: COMMERCIAL

## 2024-12-06 NOTE — TELEPHONE ENCOUNTER
[facility] Call Center    Phone Message    May a detailed message be left on voicemail: yes     Reason for Call: Other: . Merary with billing is calling, the section under the physician statement on pts Sterilization consent form needs to be filled out, the provider also needs to select opt 1 or 2, then sign the bottom. Please upload to the media tab in epic once complete, and call Merary at [phone] with any questions, thank you!    Action Taken: Message routed to:  Other: obgyn    Travel Screening: Not Applicable     Date of Service:

## 2025-01-17 ENCOUNTER — APPOINTMENT (OUTPATIENT)
Dept: CT IMAGING | Facility: CLINIC | Age: 45
End: 2025-01-17
Attending: EMERGENCY MEDICINE
Payer: COMMERCIAL

## 2025-01-17 ENCOUNTER — APPOINTMENT (OUTPATIENT)
Dept: ULTRASOUND IMAGING | Facility: CLINIC | Age: 45
End: 2025-01-17
Attending: EMERGENCY MEDICINE
Payer: COMMERCIAL

## 2025-01-17 ENCOUNTER — HOSPITAL ENCOUNTER (EMERGENCY)
Facility: CLINIC | Age: 45
Discharge: HOME | End: 2025-01-17
Attending: EMERGENCY MEDICINE | Admitting: EMERGENCY MEDICINE
Payer: COMMERCIAL

## 2025-01-17 ENCOUNTER — APPOINTMENT (OUTPATIENT)
Dept: MRI IMAGING | Facility: CLINIC | Age: 45
End: 2025-01-17
Attending: EMERGENCY MEDICINE
Payer: COMMERCIAL

## 2025-01-17 VITALS
SYSTOLIC BLOOD PRESSURE: 136 MMHG | OXYGEN SATURATION: 98 % | WEIGHT: 211 LBS | RESPIRATION RATE: 19 BRPM | DIASTOLIC BLOOD PRESSURE: 73 MMHG | HEART RATE: 60 BPM | TEMPERATURE: 97.7 F

## 2025-01-17 DIAGNOSIS — G93.5 CHIARI MALFORMATION TYPE I (H): ICD-10-CM

## 2025-01-17 DIAGNOSIS — E04.1 THYROID NODULE: ICD-10-CM

## 2025-01-17 DIAGNOSIS — R55 SYNCOPE, UNSPECIFIED SYNCOPE TYPE: ICD-10-CM

## 2025-01-17 DIAGNOSIS — R53.1 WEAKNESS: ICD-10-CM

## 2025-01-17 LAB
ABO + RH BLD: NORMAL
ANION GAP SERPL CALCULATED.3IONS-SCNC: 10 MMOL/L (ref 7–15)
APTT PPP: 29 SECONDS (ref 22–38)
ATRIAL RATE - MUSE: 66 BPM
BASOPHILS # BLD AUTO: 0 10E3/UL (ref 0–0.2)
BASOPHILS NFR BLD AUTO: 1 %
BLD GP AB SCN SERPL QL: NEGATIVE
BUN SERPL-MCNC: 14.8 MG/DL (ref 6–20)
CALCIUM SERPL-MCNC: 8 MG/DL (ref 8.8–10.4)
CHLORIDE SERPL-SCNC: 103 MMOL/L (ref 98–107)
CREAT SERPL-MCNC: 0.68 MG/DL (ref 0.51–0.95)
DIASTOLIC BLOOD PRESSURE - MUSE: 86 MMHG
EGFRCR SERPLBLD CKD-EPI 2021: >90 ML/MIN/1.73M2
EOSINOPHIL # BLD AUTO: 0.1 10E3/UL (ref 0–0.7)
EOSINOPHIL NFR BLD AUTO: 2 %
ERYTHROCYTE [DISTWIDTH] IN BLOOD BY AUTOMATED COUNT: 13 % (ref 10–15)
GLUCOSE BLDC GLUCOMTR-MCNC: 129 MG/DL (ref 70–99)
GLUCOSE SERPL-MCNC: 102 MG/DL (ref 70–99)
HCG SERPL QL: NEGATIVE
HCO3 SERPL-SCNC: 23 MMOL/L (ref 22–29)
HCT VFR BLD AUTO: 31.6 % (ref 35–47)
HGB BLD-MCNC: 9.9 G/DL (ref 11.7–15.7)
IMM GRANULOCYTES # BLD: 0 10E3/UL
IMM GRANULOCYTES NFR BLD: 0 %
INR PPP: 1.08 (ref 0.85–1.15)
INTERPRETATION ECG - MUSE: NORMAL
LYMPHOCYTES # BLD AUTO: 1.6 10E3/UL (ref 0.8–5.3)
LYMPHOCYTES NFR BLD AUTO: 42 %
MAGNESIUM SERPL-MCNC: 1.7 MG/DL (ref 1.7–2.3)
MCH RBC QN AUTO: 28.4 PG (ref 26.5–33)
MCHC RBC AUTO-ENTMCNC: 31.3 G/DL (ref 31.5–36.5)
MCV RBC AUTO: 91 FL (ref 78–100)
MONOCYTES # BLD AUTO: 0.3 10E3/UL (ref 0–1.3)
MONOCYTES NFR BLD AUTO: 7 %
NEUTROPHILS # BLD AUTO: 1.9 10E3/UL (ref 1.6–8.3)
NEUTROPHILS NFR BLD AUTO: 49 %
NRBC # BLD AUTO: 0 10E3/UL
NRBC BLD AUTO-RTO: 0 /100
P AXIS - MUSE: 48 DEGREES
PLATELET # BLD AUTO: 229 10E3/UL (ref 150–450)
POTASSIUM SERPL-SCNC: 3.5 MMOL/L (ref 3.4–5.3)
PR INTERVAL - MUSE: 228 MS
QRS DURATION - MUSE: 98 MS
QT - MUSE: 430 MS
QTC - MUSE: 450 MS
R AXIS - MUSE: 16 DEGREES
RBC # BLD AUTO: 3.48 10E6/UL (ref 3.8–5.2)
SODIUM SERPL-SCNC: 136 MMOL/L (ref 135–145)
SPECIMEN EXP DATE BLD: NORMAL
SYSTOLIC BLOOD PRESSURE - MUSE: 158 MMHG
T AXIS - MUSE: 36 DEGREES
TROPONIN T SERPL HS-MCNC: <6 NG/L
TSH SERPL DL<=0.005 MIU/L-ACNC: 0.79 UIU/ML (ref 0.3–4.2)
VENTRICULAR RATE- MUSE: 66 BPM
WBC # BLD AUTO: 3.9 10E3/UL (ref 4–11)

## 2025-01-17 PROCEDURE — 86901 BLOOD TYPING SEROLOGIC RH(D): CPT | Performed by: EMERGENCY MEDICINE

## 2025-01-17 PROCEDURE — 250N000011 HC RX IP 250 OP 636: Performed by: EMERGENCY MEDICINE

## 2025-01-17 PROCEDURE — 85004 AUTOMATED DIFF WBC COUNT: CPT | Performed by: EMERGENCY MEDICINE

## 2025-01-17 PROCEDURE — 80048 BASIC METABOLIC PNL TOTAL CA: CPT | Performed by: EMERGENCY MEDICINE

## 2025-01-17 PROCEDURE — 999N000104 CT CERVICAL SPINE RECONSTRUCTED

## 2025-01-17 PROCEDURE — 84484 ASSAY OF TROPONIN QUANT: CPT | Performed by: EMERGENCY MEDICINE

## 2025-01-17 PROCEDURE — 93005 ELECTROCARDIOGRAM TRACING: CPT | Performed by: EMERGENCY MEDICINE

## 2025-01-17 PROCEDURE — 76856 US EXAM PELVIC COMPLETE: CPT

## 2025-01-17 PROCEDURE — 85041 AUTOMATED RBC COUNT: CPT | Performed by: EMERGENCY MEDICINE

## 2025-01-17 PROCEDURE — 84443 ASSAY THYROID STIM HORMONE: CPT | Performed by: EMERGENCY MEDICINE

## 2025-01-17 PROCEDURE — 83735 ASSAY OF MAGNESIUM: CPT | Performed by: EMERGENCY MEDICINE

## 2025-01-17 PROCEDURE — 70496 CT ANGIOGRAPHY HEAD: CPT

## 2025-01-17 PROCEDURE — 99285 EMERGENCY DEPT VISIT HI MDM: CPT | Mod: 25

## 2025-01-17 PROCEDURE — 70553 MRI BRAIN STEM W/O & W/DYE: CPT

## 2025-01-17 PROCEDURE — 71275 CT ANGIOGRAPHY CHEST: CPT

## 2025-01-17 PROCEDURE — 84703 CHORIONIC GONADOTROPIN ASSAY: CPT | Performed by: EMERGENCY MEDICINE

## 2025-01-17 PROCEDURE — 36415 COLL VENOUS BLD VENIPUNCTURE: CPT | Performed by: EMERGENCY MEDICINE

## 2025-01-17 PROCEDURE — 85730 THROMBOPLASTIN TIME PARTIAL: CPT | Performed by: EMERGENCY MEDICINE

## 2025-01-17 PROCEDURE — 85610 PROTHROMBIN TIME: CPT | Performed by: EMERGENCY MEDICINE

## 2025-01-17 PROCEDURE — A9585 GADOBUTROL INJECTION: HCPCS | Performed by: EMERGENCY MEDICINE

## 2025-01-17 PROCEDURE — 72141 MRI NECK SPINE W/O DYE: CPT

## 2025-01-17 PROCEDURE — 70450 CT HEAD/BRAIN W/O DYE: CPT

## 2025-01-17 PROCEDURE — 255N000002 HC RX 255 OP 636: Performed by: EMERGENCY MEDICINE

## 2025-01-17 PROCEDURE — 86900 BLOOD TYPING SEROLOGIC ABO: CPT | Performed by: EMERGENCY MEDICINE

## 2025-01-17 PROCEDURE — 82962 GLUCOSE BLOOD TEST: CPT

## 2025-01-17 PROCEDURE — 99449 NTRPROF PH1/NTRNET/EHR 31/>: CPT | Mod: 4UV | Performed by: NURSE PRACTITIONER

## 2025-01-17 PROCEDURE — 76830 TRANSVAGINAL US NON-OB: CPT

## 2025-01-17 RX ORDER — GADOBUTROL 604.72 MG/ML
9.5 INJECTION INTRAVENOUS ONCE
Status: COMPLETED | OUTPATIENT
Start: 2025-01-17 | End: 2025-01-17

## 2025-01-17 RX ORDER — IOPAMIDOL 755 MG/ML
75 INJECTION, SOLUTION INTRAVASCULAR ONCE
Status: COMPLETED | OUTPATIENT
Start: 2025-01-17 | End: 2025-01-17

## 2025-01-17 RX ORDER — IOPAMIDOL 755 MG/ML
67 INJECTION, SOLUTION INTRAVASCULAR ONCE
Status: COMPLETED | OUTPATIENT
Start: 2025-01-17 | End: 2025-01-17

## 2025-01-17 RX ADMIN — GADOBUTROL 9.5 ML: 604.72 INJECTION INTRAVENOUS at 16:26

## 2025-01-17 RX ADMIN — IOPAMIDOL 75 ML: 755 INJECTION, SOLUTION INTRAVENOUS at 13:05

## 2025-01-17 ASSESSMENT — ACTIVITIES OF DAILY LIVING (ADL)
ADLS_ACUITY_SCORE: 43
ADLS_ACUITY_SCORE: 41

## 2025-01-17 ASSESSMENT — COLUMBIA-SUICIDE SEVERITY RATING SCALE - C-SSRS
1. IN THE PAST MONTH, HAVE YOU WISHED YOU WERE DEAD OR WISHED YOU COULD GO TO SLEEP AND NOT WAKE UP?: NO
2. HAVE YOU ACTUALLY HAD ANY THOUGHTS OF KILLING YOURSELF IN THE PAST MONTH?: NO
6. HAVE YOU EVER DONE ANYTHING, STARTED TO DO ANYTHING, OR PREPARED TO DO ANYTHING TO END YOUR LIFE?: NO

## 2025-01-17 NOTE — ED PROVIDER NOTES
EMERGENCY DEPARTMENT ENCOUNTER      NAME: Murali Norwood  AGE: 44 year old female  YOB: 1980  MRN: [id number]  EVALUATION DATE & TIME: 1/17/2025 12:30 PM    PCP: No primary care provider on file.    ED PROVIDER: Chris Ugarte MD      Chief Complaint   Patient presents with    Loss of Consciousness    Fall         FINAL IMPRESSION:  1. Chiari malformation type I (H)    2. Thyroid nodule    3. Syncope, unspecified syncope type    4. Weakness          ED COURSE & MEDICAL DECISION MAKING:    Pertinent Labs & Imaging studies reviewed. (See chart for details)  44 year old female presents to the Emergency Department for evaluation of syncope, headache, arm and leg weakness    Paramedics provided a history as well as patient    Medics report B-FAST test was positive in the field for weakness to arms but question effort.  On my exam alert oriented.  Does not appear to be postictal.  No reports of seizure.  Has triplets at home therefore exhaustion is possible.  Had postpartum hemorrhage recently.  Has had 4 months of vaginal bleeding.  Denies chest pain or abdominal pain or fever.  Denies neck pain    Reports her arms and legs are weak but improving    Breast-feeding and formula feeding children    Patient misregistered.  History reviewed and Care Everywhere for a different MRN    Hemoglobin 9 in October.     Was recently started on Tylenol for her back.  Denies being on muscle relaxants or narcotics.    12:51 PM I met with the patient and obtained initial history.  230 PM patient is back to normal    ED Course as of 01/17/25 1434   Fri Jan 17, 2025   1401 Troponin T, High Sensitivity: <6  DDX seizure, subarachnoid hemorrhage, ruptured abdominal aortic aneurysm, aortic dissection, perforated gastric/duodenal ulcer, ruptured ectopic pregnancy, stroke/TIA, acute coronary syndrome, pulmonary embolism, arrhythmia (atrioventricular block/symptomatic bradycardia, Laquita-Parkinson-White syndrome, Brugada  syndrome, hypertrophic cardiomyopathy, long QT syndrome, arrhythmogenic right ventricular dysplasia), aortic stenosis, hypoglycemia, vasovagal, or orthostatic hypotension.     1401 COVID-19 based on syncope and focal weakness.  Doubt stroke.  Doubt spinal cord injury.  Doubt seizure.  Doubt Guillain-Barré with symmetric reflexes to patella and Achilles.   1401 TSH normal.  Is normal.  Troponin normal.  Pregnancy test normal.  Hemoglobin 9.9 which is similar to where it was in October 1402 Leukopenia unknown significance   1402 Afebrile.  Doubt sepsis   1418 No fever, no abdominal pain   1418 On exam her weakness has resolved.     1422 Patient is back to normal.  Stroke neuro de-escalated recommends MRI brain with and without contrast.  With fall and transient arm and leg weakness will obtain MRI C-spine to look for spinal cord injury which seems less likely.   1422 4 months of vaginal bleeding will obtain ultrasound to look for retained products of conception special for leukopenia.  Patient denies fever abdominal pain however   1422 Pt signed out to Dr. Sheikh pending completion of workup     Likely discharge home    Medical Decision Making  Obtained supplemental history:Supplemental history obtained?: Documented in chart and EMS  Reviewed external records: External records reviewed?: Documented in chart  Care impacted by chronic illness:Documented in Chart  Did you consider but not order tests?: Work up considered but not performed and documented in chart, if applicable  Did you interpret images independently?: Independent interpretation of ECG and images noted in documentation, when applicable.  Consultation discussion with other provider:Did you involve another provider (consultant, MH, pharmacy, etc.)?: I discussed the care with another health care provider, see documentation for details.  Admission considered. Patient was signed out to the oncoming physician, disposition pending.    MIPS: CT Pulmonary  Angiogram:Patient is moderate to high risk for PE.            At the conclusion of the encounter I discussed the results of all of the tests and the disposition. The questions were answered. The patient or family acknowledged understanding and was agreeable with the care plan.       MEDICATIONS GIVEN IN THE EMERGENCY:  Medications   iopamidol (ISOVUE-370) solution 67 mL (75 mLs Intravenous $Given 1/17/25 1305)     And   sodium chloride 0.9 % bag for CT scan flush use (has no administration in time range)   iopamidol (ISOVUE-370) solution 75 mL (75 mLs Intravenous $Given 1/17/25 1305)       NEW PRESCRIPTIONS STARTED AT TODAY'S ER VISIT  New Prescriptions    No medications on file          =================================================================    TRIAGE ASSESSMENT:        HPI    Patient information was obtained from: Patient and EMS    Use of : N/A         Murali Norwood is a 44 year old female with a pertinent history of postpartum hemorrhage who presents to this ED via EMS for evaluation of fall and loss of consciousness.  Delivered triplets in September    Per EMS:  Patient was breastfeeding when her  heard a loud thud. Patient had a loss of consciousness that lasted approximately 10 minutes. Patient went to one knee first and did not fall from standing. Patient is nauseous, was dry heaving, and weak. Patient has had intermittent vaginal bleeding since giving birth. Patient is not taking blood thinners, having head or neck pain. Patient is hypertensive. BILL negative    Per Patient:  Patient states she gave birth in September, was breastfeeding and felt weak. Patient states she lost consciousness and feels weak. Patient states she just started a pain medication for lower back pain. Patient states she has an OBGYN appointment scheduled for 01/31/2025. Patient states her mood is ok and does not feel depressed. Patient states she feels numbness in her hands.    Reports 4 weeks of  vaginal bleeding    Patient does not endorse anxiety, fainting spells, preeclampsia, chest pain, abdominal pain, neck pain, or any other concerns at this time.     Breast-feeding and formula feeding.  No fever.  No chest pain.  No abdominal pain.      REVIEW OF SYSTEMS   Review of Systems     PAST MEDICAL HISTORY:  No past medical history on file.    PAST SURGICAL HISTORY:  No past surgical history on file.        CURRENT MEDICATIONS:    No current outpatient medications on file.      ALLERGIES:  Not on File    FAMILY HISTORY:  No family history on file.    SOCIAL HISTORY:   Social History     Socioeconomic History    Marital status:        VITALS:  /76   Pulse 59   Temp 97.7  F (36.5  C) (Oral)   Resp 19   Wt 95.7 kg (211 lb)   SpO2 97%     PHYSICAL EXAM      Vitals: /76   Pulse 59   Temp 97.7  F (36.5  C) (Oral)   Resp 19   Wt 95.7 kg (211 lb)   SpO2 97%   General: Appears in no acute distress, awake, alert, interactive.  Eyes: Conjunctivae non-injected. Sclera anicteric.  Pupils equal round reactive to light  HENT: Atraumatic.  Neck: Supple.  No neck tenderness  Respiratory/Chest: Respiration unlabored.  Abdomen: non distended, no abdominal tenderness  Musculoskeletal: Normal extremities. No edema or erythema.  Skin: Normal color. No rash or diaphoresis.  Neurologic: Face symmetric, moves all extremities spontaneously. Speech clear.  Arms and legs drift against gravity.  Patient's exam seems slightly volitional, like she is trying to force her feet down.  Symmetric 2+ patellar and Achilles reflexes  Psychiatric: Oriented to person, place, and time. Affect appropriate.    LAB:  All pertinent labs reviewed and interpreted.  Results for orders placed or performed during the hospital encounter of 01/17/25   CT Head w/o Contrast    Impression    IMPRESSION:   HEAD CT:  1.  No acute intracranial process.  2.  Chiari I malformation.    HEAD CTA:   1.  Normal CTA [place].    NECK  CTA:  1.  Normal neck CTA.  2.  Bilateral thyroid nodules measuring up to 2.3 cm on the left. Recommend thyroid ultrasound for further evaluation.    REFERENCE:  Anderson JK et al. Managing Incidental Thyroid Nodules Detected on Imaging: White Paper of the ACR Incidental Thyroid Findings Committee. JACR 2015; 12:143-150.    Incidental thyroid nodule detected on CT or MRI without suspicious findings. Applies to general population without limited life expectancy or significant comorbidities.    Age less than 35 years  Less than 1 cm: No further evaluation.  Greater than or equal to 1 cm: Evaluate with thyroid ultrasound.    Findings were discussed with Dr. Ugarte by myself at 1:02 PM on 1/17/2025.   CTA Head Neck with Contrast    Impression    IMPRESSION:   HEAD CT:  1.  No acute intracranial process.  2.  Chiari I malformation.    HEAD CTA:   1.  Normal CTA [place].    NECK CTA:  1.  Normal neck CTA.  2.  Bilateral thyroid nodules measuring up to 2.3 cm on the left. Recommend thyroid ultrasound for further evaluation.    REFERENCE:  Anderson JK et al. Managing Incidental Thyroid Nodules Detected on Imaging: White Paper of the ACR Incidental Thyroid Findings Committee. JACR 2015; 12:143-150.    Incidental thyroid nodule detected on CT or MRI without suspicious findings. Applies to general population without limited life expectancy or significant comorbidities.    Age less than 35 years  Less than 1 cm: No further evaluation.  Greater than or equal to 1 cm: Evaluate with thyroid ultrasound.    Findings were discussed with Dr. Ugarte by myself at 1:02 PM on 1/17/2025.   CT Chest Pulmonary Embolism w Contrast    Impression    IMPRESSION:    1.  No pulmonary embolism identified.     CT Cervical Spine Reconstructed    Impression    IMPRESSION:  1.  No fracture or subluxation of the cervical spine.     Basic metabolic panel   Result Value Ref Range    Sodium 136 135 - 145 mmol/L    Potassium 3.5 3.4 - 5.3 mmol/L    Chloride  103 98 - 107 mmol/L    Carbon Dioxide (CO2) 23 22 - 29 mmol/L    Anion Gap 10 7 - 15 mmol/L    Urea Nitrogen 14.8 6.0 - 20.0 mg/dL    Creatinine 0.68 0.51 - 0.95 mg/dL    GFR Estimate >90 >60 mL/min/1.73m2    Calcium 8.0 (L) 8.8 - 10.4 mg/dL    Glucose 102 (H) 70 - 99 mg/dL   Result Value Ref Range    INR 1.08 0.85 - 1.15   Partial thromboplastin time   Result Value Ref Range    aPTT 29 22 - 38 Seconds   Result Value Ref Range    Troponin T, High Sensitivity <6 <=14 ng/L   hCG Qualitative Pregnancy   Result Value Ref Range    hCG Serum Qualitative Negative Negative   TSH with free T4 reflex   Result Value Ref Range    TSH 0.79 0.30 - 4.20 uIU/mL   Result Value Ref Range    Magnesium 1.7 1.7 - 2.3 mg/dL   Glucose by meter   Result Value Ref Range    GLUCOSE BY METER POCT 129 (H) 70 - 99 mg/dL   CBC with platelets and differential   Result Value Ref Range    WBC Count 3.9 (L) 4.0 - 11.0 10e3/uL    RBC Count 3.48 (L) 3.80 - 5.20 10e6/uL    Hemoglobin 9.9 (L) 11.7 - 15.7 g/dL    Hematocrit 31.6 (L) 35.0 - 47.0 %    MCV 91 78 - 100 fL    MCH 28.4 26.5 - 33.0 pg    MCHC 31.3 (L) 31.5 - 36.5 g/dL    RDW 13.0 10.0 - 15.0 %    Platelet Count 229 150 - 450 10e3/uL    % Neutrophils 49 %    % Lymphocytes 42 %    % Monocytes 7 %    % Eosinophils 2 %    % Basophils 1 %    % Immature Granulocytes 0 %    NRBCs per 100 WBC 0 <1 /100    Absolute Neutrophils 1.9 1.6 - 8.3 10e3/uL    Absolute Lymphocytes 1.6 0.8 - 5.3 10e3/uL    Absolute Monocytes 0.3 0.0 - 1.3 10e3/uL    Absolute Eosinophils 0.1 0.0 - 0.7 10e3/uL    Absolute Basophils 0.0 0.0 - 0.2 10e3/uL    Absolute Immature Granulocytes 0.0 <=0.4 10e3/uL    Absolute NRBCs 0.0 10e3/uL   Adult Type and Screen   Result Value Ref Range    ABO/RH(D) O POS     Antibody Screen Negative Negative    SPECIMEN EXPIRATION DATE [id number]        RADIOLOGY:  Reviewed all pertinent imaging. Please see official radiology report.  CT Chest Pulmonary Embolism w Contrast   Final Result    IMPRESSION:      1.  No pulmonary embolism identified.         CT Cervical Spine Reconstructed   Final Result   IMPRESSION:   1.  No fracture or subluxation of the cervical spine.         CTA Head Neck with Contrast   Final Result   IMPRESSION:    HEAD CT:   1.  No acute intracranial process.   2.  Chiari I malformation.      HEAD CTA:    1.  Normal CTA [place].      NECK CTA:   1.  Normal neck CTA.   2.  Bilateral thyroid nodules measuring up to 2.3 cm on the left. Recommend thyroid ultrasound for further evaluation.      REFERENCE:   Anderson JK et al. Managing Incidental Thyroid Nodules Detected on Imaging: White Paper of the ACR Incidental Thyroid Findings Committee. JACR 2015; 12:143-150.      Incidental thyroid nodule detected on CT or MRI without suspicious findings. Applies to general population without limited life expectancy or significant comorbidities.      Age less than 35 years   Less than 1 cm: No further evaluation.   Greater than or equal to 1 cm: Evaluate with thyroid ultrasound.      Findings were discussed with Dr. Ugarte by myself at 1:02 PM on 1/17/2025.      CT Head w/o Contrast   Final Result   IMPRESSION:    HEAD CT:   1.  No acute intracranial process.   2.  Chiari I malformation.      HEAD CTA:    1.  Normal CTA [place].      NECK CTA:   1.  Normal neck CTA.   2.  Bilateral thyroid nodules measuring up to 2.3 cm on the left. Recommend thyroid ultrasound for further evaluation.      REFERENCE:   Justin JK et al. Managing Incidental Thyroid Nodules Detected on Imaging: White Paper of the ACR Incidental Thyroid Findings Committee. JACR 2015; 12:143-150.      Incidental thyroid nodule detected on CT or MRI without suspicious findings. Applies to general population without limited life expectancy or significant comorbidities.      Age less than 35 years   Less than 1 cm: No further evaluation.   Greater than or equal to 1 cm: Evaluate with thyroid ultrasound.      Findings were  discussed with Dr. Ugarte by myself at 1:02 PM on 1/17/2025.      Cervical spine MRI w/o contrast    (Results Pending)   MR Brain w/o & w Contrast    (Results Pending)   US Pelvic Complete with Transvaginal    (Results Pending)       EKG:    Performed at: 01/17/2025 12:41    Impression: Sinus rhythm with first degree A-V block  Nonspecific T-wave abnormality  Abnormal ECG    Rate: 66 BPM  Rhythm: Sinus Rhythm  Axis: 48 16 36  MN Interval: 228 ms  QRS Interval: 98 ms  QTc Interval: 430/450 ms  ST Changes:   Comparison: No previous ECG available    I have independently reviewed and interpreted the EKG(s) documented above.            I, Janusz Klein, am serving as a scribe to document services personally performed by Chris Ugarte MD based on my observation and the provider's statements to me. I, Chris Ugarte MD, attest that Janusz Klein is acting in a scribe capacity, has observed my performance of the services and has documented them in accordance with my direction.    Chris Ugarte MD  [facility] EMERGENCY ROOM  [address] [facility] [address]  [phone]      Chris Ugarte MD  01/17/25 2479

## 2025-01-17 NOTE — DISCHARGE INSTRUCTIONS
Fortunately workup in the emergency department today was reassuring.  Did not show any signs of a stroke or bleeding in or around your brain.  We did incidentally see some nodules in her thyroid which you should follow-up in a routine fashion with your primary care doctor as they may need to do an ultrasound to make sure that these nodules are not changing in size.  Otherwise her pelvic ultrasound did show some uterine fibroids which may be a cause of your persistent vaginal bleeding.  Should follow-up with your OB/GYN doctor for this.  Otherwise if you develop recurrent loss of consciousness, new neurological findings such as one-sided weakness or other concerning symptom please return to the emergency department for repeat evaluation.

## 2025-01-17 NOTE — CONSULTS
[facility]    Stroke Telephone Note    I was called by Chris Ugarte on 01/17/25 regarding patient Murali Norwood. The patient is a 44 year old female with limited known PMH (no records are currently available). Per ED provider patient reports she is 4 months postpartum and has had ongoing bleeding since that time. She presents today for evaluation of syncope and weakness. LKW reported as 1200 when her  found her unconscious after hearing a loud sound from another room. She now endorses a mild headache Per ED provider on examination she has generalized weakness with equal drop in all extremities on muscle testing, reflexes reportedly normal throughout and no focal deficits are appreciated. /86.    Vitals  BP: (!) 158/86   Pulse: 63   Resp: 18   Temp: 97.7  F (36.5  C)   Weight: 95.7 kg (211 lb)    Stroke Code Data (for stroke code without tele)  Stroke code activated 01/17/25  1242   Stroke provider first response 01/17/25  1243   Last known normal 01/17/25  1200      Time of discovery (or onset of symptoms) 01/17/25  1200   Head CT read by Stroke Neuro Provider 01/17/25  1253   Was stroke code de-escalated? Yes  01/17/25  1307     Imaging Findings  CT head: no hemorrhage or other acute findings  CTA head/neck: No LVO, significant stenosis or dissection     Intravenous Thrombolysis  Not given due to:   - active bleeding (reports bleeding since delivery 4 months ago)    Endovascular Treatment  Not initiated due to absence of proximal vessel occlusion    Impression  Syncope, generalized weakness.     Recommendations   -brain MRI with and without contrast; please page stroke neurology if infarct is identified for further recommendations    Case discussed with vascular neurology attending Dr. Galloway.    My recommendations are based on the information provided over the phone by Murali Norwood's in-person providers. They are not intended to replace the clinical  "judgment of her in-person providers. I was not requested to personally see or examine the patient at this time.     ANI Morfin CNP  Vascular Neurology    To page me or covering stroke neurology team member, click here: [facility]  Choose \"On Call\" tab at top, then select \"NEUROLOGY/ALL SITES\" from middle drop-down box, press Enter, then look for \"stroke\" or \"telestroke\" for your site.   "

## 2025-01-17 NOTE — ED TRIAGE NOTES
Pt presents via EMS from home. Per EMS pt was found unconscious by her  after he heard a loud noise from another room. Pt remembers little of the syncopal episode. Pt denies pain but endorses severe generalized weakness. Pt unable to hold extremities off bed for provider. Pt endorses headache. Tier 1 stroke code called by Dr. Ugarte.     Triage Assessment (Adult)       Row Name 01/17/25 1240          Triage Assessment    Airway WDL WDL        Respiratory WDL    Respiratory WDL WDL        Skin Circulation/Temperature WDL    Skin Circulation/Temperature WDL WDL        Cardiac WDL    Cardiac WDL WDL        Peripheral/Neurovascular WDL    Peripheral Neurovascular WDL WDL        Cognitive/Neuro/Behavioral WDL    Cognitive/Neuro/Behavioral WDL X     Level of Consciousness lethargic        Pupils (CN II)    Pupil PERRLA yes     Pupil Size Left 2 mm     Pupil Size Right 2 mm        [place] Coma Scale    Best Eye Response 4-->(E4) spontaneous     Best Motor Response 6-->(M6) obeys commands     Best Verbal Response 5-->(V5) oriented     [place] Coma Scale Score 15

## 2025-01-17 NOTE — ED PROVIDER NOTES
EMERGENCY DEPARTMENT SIGN OUT NOTE        ED COURSE AND MEDICAL DECISION MAKING  Patient was signed out to me by Dr Lorenzo Ugarte at 2:55 PM    In brief, Murali Norwood is a 44 year old female who initially presented for syncope, headache, arm and leg weakness.     At time of sign out, disposition was pending results of her MRI and pelvic ultrasound.    MRI imaging the brain and C-spine do not show any acute intra or cranial lower significant abnormalities to explain her symptoms.    Pelvic ultrasound shows multiple uterine fibroids but normal-appearing endometrial stripe and normal-appearing ovaries.    Upon repeat evaluation patient is awake alert and oriented answers questions appropriately, no apparent weakness in the upper or lower extremities or difficulties with speech.  She states she is asymptomatic this point in time.  Unclear etiology of her symptoms earlier today.  Could have been syncopal type event or perhaps seizure activity.  However with resolution of symptoms and overall reassuring workup believe she safe for discharge home at this point in time.  Patient comfortable this plan and plans for close follow-up with her primary care doctor and OB/GYN.  Otherwise signs symptoms worsen condition were discussed return precautions given.  Patient discharged stable condition.    FINAL IMPRESSION    1. Chiari malformation type I (H)    2. Thyroid nodule    3. Syncope, unspecified syncope type    4. Weakness        ED MEDS  Medications   iopamidol (ISOVUE-370) solution 67 mL (75 mLs Intravenous $Given 1/17/25 1305)   iopamidol (ISOVUE-370) solution 75 mL (75 mLs Intravenous $Given 1/17/25 1305)   gadobutrol (GADAVIST) injection 9.5 mL (9.5 mLs Intravenous $Given 1/17/25 1626)       LAB  Labs Ordered and Resulted from Time of ED Arrival to Time of ED Departure   BASIC METABOLIC PANEL - Abnormal       Result Value    Sodium 136      Potassium 3.5      Chloride 103      Carbon Dioxide (CO2) 23      Anion  Gap 10      Urea Nitrogen 14.8      Creatinine 0.68      GFR Estimate >90      Calcium 8.0 (*)     Glucose 102 (*)    GLUCOSE BY METER - Abnormal    GLUCOSE BY METER POCT 129 (*)    CBC WITH PLATELETS AND DIFFERENTIAL - Abnormal    WBC Count 3.9 (*)     RBC Count 3.48 (*)     Hemoglobin 9.9 (*)     Hematocrit 31.6 (*)     MCV 91      MCH 28.4      MCHC 31.3 (*)     RDW 13.0      Platelet Count 229      % Neutrophils 49      % Lymphocytes 42      % Monocytes 7      % Eosinophils 2      % Basophils 1      % Immature Granulocytes 0      NRBCs per 100 WBC 0      Absolute Neutrophils 1.9      Absolute Lymphocytes 1.6      Absolute Monocytes 0.3      Absolute Eosinophils 0.1      Absolute Basophils 0.0      Absolute Immature Granulocytes 0.0      Absolute NRBCs 0.0     INR - Normal    INR 1.08     PARTIAL THROMBOPLASTIN TIME - Normal    aPTT 29     TROPONIN T, HIGH SENSITIVITY - Normal    Troponin T, High Sensitivity <6     HCG QUALITATIVE PREGNANCY - Normal    hCG Serum Qualitative Negative     TSH WITH FREE T4 REFLEX - Normal    TSH 0.79     MAGNESIUM - Normal    Magnesium 1.7     TYPE AND SCREEN, ADULT    ABO/RH(D) O POS      Antibody Screen Negative      SPECIMEN EXPIRATION DATE [id number]     ABO/RH TYPE AND SCREEN       EKG      RADIOLOGY    Cervical spine MRI w/o contrast   Final Result   IMPRESSION:   HEAD MRI:    1.  No acute intracranial process or pathologic enhancement.   2.  Borderline cerebellar tonsillar ectopia measuring 5 mm, may represent Chiari I malformation.      CERVICAL SPINE MRI:   1.  No high-grade spinal canal or neural foraminal stenosis.   2.  Multiple bilateral thyroid nodules. A nonemergent thyroid ultrasound is recommended for further characterization.      MR Brain w/o & w Contrast   Final Result   IMPRESSION:   HEAD MRI:    1.  No acute intracranial process or pathologic enhancement.   2.  Borderline cerebellar tonsillar ectopia measuring 5 mm, may represent Chiari I malformation.       CERVICAL SPINE MRI:   1.  No high-grade spinal canal or neural foraminal stenosis.   2.  Multiple bilateral thyroid nodules. A nonemergent thyroid ultrasound is recommended for further characterization.      US Pelvic Complete with Transvaginal   Final Result   IMPRESSION:   1.  Enlarged uterus with multiple fibroids.   2.  Normal endometrial stripe thickness with trace fluid in the endometrial canal, probably related to the reported vaginal bleeding.   3.  Normal ovaries.               CT Chest Pulmonary Embolism w Contrast   Final Result   IMPRESSION:      1.  No pulmonary embolism identified.         CT Cervical Spine Reconstructed   Final Result   IMPRESSION:   1.  No fracture or subluxation of the cervical spine.         CTA Head Neck with Contrast   Final Result   IMPRESSION:    HEAD CT:   1.  No acute intracranial process.   2.  Chiari I malformation.      HEAD CTA:    1.  Normal CTA [place].      NECK CTA:   1.  Normal neck CTA.   2.  Bilateral thyroid nodules measuring up to 2.3 cm on the left. Recommend thyroid ultrasound for further evaluation.      REFERENCE:   Justin ACEVES et al. Managing Incidental Thyroid Nodules Detected on Imaging: White Paper of the ACR Incidental Thyroid Findings Committee. JACR 2015; 12:143-150.      Incidental thyroid nodule detected on CT or MRI without suspicious findings. Applies to general population without limited life expectancy or significant comorbidities.      Age less than 35 years   Less than 1 cm: No further evaluation.   Greater than or equal to 1 cm: Evaluate with thyroid ultrasound.      Findings were discussed with Dr. Ugarte by myself at 1:02 PM on 1/17/2025.      CT Head w/o Contrast   Final Result   IMPRESSION:    HEAD CT:   1.  No acute intracranial process.   2.  Chiari I malformation.      HEAD CTA:    1.  Normal CTA [place].      NECK CTA:   1.  Normal neck CTA.   2.  Bilateral thyroid nodules measuring up to 2.3 cm on the left. Recommend  thyroid ultrasound for further evaluation.      REFERENCE:   Justin ACEVES et al. Managing Incidental Thyroid Nodules Detected on Imaging: White Paper of the ACR Incidental Thyroid Findings Committee. JACR 2015; 12:143-150.      Incidental thyroid nodule detected on CT or MRI without suspicious findings. Applies to general population without limited life expectancy or significant comorbidities.      Age less than 35 years   Less than 1 cm: No further evaluation.   Greater than or equal to 1 cm: Evaluate with thyroid ultrasound.      Findings were discussed with Dr. Ugarte by myself at 1:02 PM on 1/17/2025.          DISCHARGE MEDS  There are no discharge medications for this patient.    Ismael Sheikh MD  [facility] EMERGENCY ROOM  [facility] [facility] [id number] [phone]     Ismael Sheikh MD  01/17/25 8878

## (undated) DEVICE — RX SURGIFLO HEMOSTATIC MATRIX W/THROMBIN 8ML 2994

## (undated) DEVICE — STRAP KNEE/BODY 31143004

## (undated) DEVICE — PREP CHLORAPREP 26ML TINTED ORANGE  260815

## (undated) DEVICE — SU VICRYL 0 CT-1 36" J346H

## (undated) DEVICE — GLOVE ESTEEM POWDER FREE SMT 6.5  2D72PT65

## (undated) DEVICE — GLOVE PROTEXIS BLUE W/NEU-THERA 7.0  2D73EB70

## (undated) DEVICE — SU MONOCRYL 0 CT-1 36" Y346H

## (undated) DEVICE — SOL WATER IRRIG 1000ML BOTTLE 07139-09

## (undated) DEVICE — ESU PENCIL W/SMOKE EVAC NEPTUNE STRYKER 0703-046-000

## (undated) DEVICE — ESU LIGASURE OPEN SEALER/DIVIDER SM JAW 16.5MM LF1212A

## (undated) DEVICE — SU VICRYL 4-0 PS-2 18" UND J496G

## (undated) DEVICE — PACK C-SECTION LF PL15OTA83B

## (undated) DEVICE — SOL NACL 0.9% IRRIG 1000ML BOTTLE 07138-09

## (undated) DEVICE — STOCKING SLEEVE COMPRESSION CALF LG

## (undated) DEVICE — SPONGE SURGIFOAM 100 1974

## (undated) DEVICE — RETR PANNICULUS TRAXI FOR PT POSITIONING PRS-1030

## (undated) DEVICE — CATH TRAY FOLEY SURESTEP 16FR WDRAIN BAG STLK LATEX A300316A

## (undated) RX ORDER — MORPHINE SULFATE 1 MG/ML
INJECTION, SOLUTION EPIDURAL; INTRATHECAL; INTRAVENOUS
Status: DISPENSED
Start: 2024-09-11

## (undated) RX ORDER — FENTANYL CITRATE 50 UG/ML
INJECTION, SOLUTION INTRAMUSCULAR; INTRAVENOUS
Status: DISPENSED
Start: 2024-09-11

## (undated) RX ORDER — KETOROLAC TROMETHAMINE 30 MG/ML
INJECTION, SOLUTION INTRAMUSCULAR; INTRAVENOUS
Status: DISPENSED
Start: 2024-09-11